# Patient Record
Sex: FEMALE | Race: WHITE | NOT HISPANIC OR LATINO
[De-identification: names, ages, dates, MRNs, and addresses within clinical notes are randomized per-mention and may not be internally consistent; named-entity substitution may affect disease eponyms.]

---

## 2017-05-22 ENCOUNTER — NON-APPOINTMENT (OUTPATIENT)
Age: 82
End: 2017-05-22

## 2017-05-22 ENCOUNTER — APPOINTMENT (OUTPATIENT)
Dept: CARDIOLOGY | Facility: CLINIC | Age: 82
End: 2017-05-22

## 2017-05-22 VITALS
WEIGHT: 113 LBS | SYSTOLIC BLOOD PRESSURE: 140 MMHG | HEART RATE: 59 BPM | BODY MASS INDEX: 21.35 KG/M2 | OXYGEN SATURATION: 97 % | DIASTOLIC BLOOD PRESSURE: 90 MMHG

## 2017-06-07 ENCOUNTER — APPOINTMENT (OUTPATIENT)
Dept: OPHTHALMOLOGY | Facility: CLINIC | Age: 82
End: 2017-06-07

## 2017-06-13 ENCOUNTER — APPOINTMENT (OUTPATIENT)
Dept: CARDIOLOGY | Facility: CLINIC | Age: 82
End: 2017-06-13

## 2017-06-14 ENCOUNTER — APPOINTMENT (OUTPATIENT)
Dept: OPHTHALMOLOGY | Facility: CLINIC | Age: 82
End: 2017-06-14

## 2017-06-14 DIAGNOSIS — H40.039 ANATOMICAL NARROW ANGLE, UNSPECIFIED EYE: ICD-10-CM

## 2017-10-16 ENCOUNTER — TRANSCRIPTION ENCOUNTER (OUTPATIENT)
Age: 82
End: 2017-10-16

## 2017-11-22 ENCOUNTER — APPOINTMENT (OUTPATIENT)
Dept: OPHTHALMOLOGY | Facility: CLINIC | Age: 82
End: 2017-11-22

## 2018-04-19 ENCOUNTER — APPOINTMENT (OUTPATIENT)
Dept: OPHTHALMOLOGY | Facility: CLINIC | Age: 83
End: 2018-04-19
Payer: MEDICARE

## 2018-04-19 PROCEDURE — 92012 INTRM OPH EXAM EST PATIENT: CPT

## 2018-04-19 PROCEDURE — 92083 EXTENDED VISUAL FIELD XM: CPT

## 2018-04-19 PROCEDURE — 92133 CPTRZD OPH DX IMG PST SGM ON: CPT

## 2018-10-31 ENCOUNTER — APPOINTMENT (OUTPATIENT)
Dept: OPHTHALMOLOGY | Facility: CLINIC | Age: 83
End: 2018-10-31
Payer: MEDICARE

## 2018-10-31 DIAGNOSIS — H40.003 PREGLAUCOMA, UNSPECIFIED, BILATERAL: ICD-10-CM

## 2018-10-31 DIAGNOSIS — H35.30 UNSPECIFIED MACULAR DEGENERATION: ICD-10-CM

## 2018-10-31 PROCEDURE — 92226: CPT | Mod: RT

## 2018-10-31 PROCEDURE — 92014 COMPRE OPH EXAM EST PT 1/>: CPT

## 2018-10-31 PROCEDURE — 92250 FUNDUS PHOTOGRAPHY W/I&R: CPT

## 2018-10-31 PROCEDURE — 92083 EXTENDED VISUAL FIELD XM: CPT

## 2018-10-31 PROCEDURE — 92020 GONIOSCOPY: CPT

## 2019-05-01 ENCOUNTER — NON-APPOINTMENT (OUTPATIENT)
Age: 84
End: 2019-05-01

## 2019-05-01 ENCOUNTER — APPOINTMENT (OUTPATIENT)
Dept: OPHTHALMOLOGY | Facility: CLINIC | Age: 84
End: 2019-05-01
Payer: MEDICARE

## 2019-05-01 PROCEDURE — 92014 COMPRE OPH EXAM EST PT 1/>: CPT

## 2019-05-01 PROCEDURE — 92083 EXTENDED VISUAL FIELD XM: CPT

## 2019-05-01 PROCEDURE — 92133 CPTRZD OPH DX IMG PST SGM ON: CPT

## 2019-11-06 ENCOUNTER — APPOINTMENT (OUTPATIENT)
Dept: OPHTHALMOLOGY | Facility: CLINIC | Age: 84
End: 2019-11-06

## 2019-12-24 ENCOUNTER — APPOINTMENT (OUTPATIENT)
Dept: CARDIOTHORACIC SURGERY | Facility: CLINIC | Age: 84
End: 2019-12-24
Payer: MEDICARE

## 2019-12-24 VITALS
TEMPERATURE: 97.6 F | RESPIRATION RATE: 13 BRPM | OXYGEN SATURATION: 98 % | SYSTOLIC BLOOD PRESSURE: 177 MMHG | HEART RATE: 50 BPM | DIASTOLIC BLOOD PRESSURE: 104 MMHG

## 2019-12-24 VITALS — WEIGHT: 100 LBS | HEIGHT: 60 IN | BODY MASS INDEX: 19.63 KG/M2

## 2019-12-24 DIAGNOSIS — I05.0 RHEUMATIC MITRAL STENOSIS: ICD-10-CM

## 2019-12-24 DIAGNOSIS — Z78.9 OTHER SPECIFIED HEALTH STATUS: ICD-10-CM

## 2019-12-24 PROCEDURE — 99204 OFFICE O/P NEW MOD 45 MIN: CPT

## 2019-12-24 RX ORDER — MULTIVIT-MIN/FOLIC/VIT K/LYCOP 400-300MCG
1000 TABLET ORAL DAILY
Refills: 0 | Status: ACTIVE | COMMUNITY
Start: 2019-12-24

## 2019-12-24 NOTE — HISTORY OF PRESENT ILLNESS
[FreeTextEntry1] : Anat is an 84 year old  active female nonsmoker with PMH of chronic atrial fibrillation (Coumadin), macular degeneration, HTN, HLD, glaucoma, and mitral valve stenosis. She has been followed by Dr. Michaels for her cardiology care in recent years. Recent transesophageal echocardiogram demonstrated aortic valve regurgitation is mild, mitral valve stenosis is severely present, mitral valve area is 0.8 cm2, mitral valve peak gradient is 14 mmHg, mitral valve mean gradient is 6 mmHg. Pulmonary HTN is severely present. Right ventricular systolic pressure is 64 mmHg. left ventricular ejection fraction is 45-50%. She reports recent CASTREJON when walking on level ground and pedal edema. She is now on Furosemide 40 mg every other day.

## 2019-12-24 NOTE — PHYSICAL EXAM
[General Appearance - Alert] : alert [General Appearance - In No Acute Distress] : in no acute distress [Sclera] : the sclera and conjunctiva were normal [PERRL With Normal Accommodation] : pupils were equal in size, round, and reactive to light [Extraocular Movements] : extraocular movements were intact [Outer Ear] : the ears and nose were normal in appearance [Oropharynx] : the oropharynx was normal [Jugular Venous Distention Increased] : there was no jugular-venous distention [Respiration, Rhythm And Depth] : normal respiratory rhythm and effort [Examination Of The Chest] : the chest was normal in appearance [Edema] : there was no peripheral edema [Veins - Varicosity Changes] : there were no varicosital changes [Breast Appearance] : normal in appearance [Bowel Sounds] : normal bowel sounds [Abdomen Soft] : soft [Cervical Lymph Nodes Enlarged Posterior Bilaterally] : posterior cervical [Cervical Lymph Nodes Enlarged Anterior Bilaterally] : anterior cervical [No CVA Tenderness] : no ~M costovertebral angle tenderness [Abnormal Walk] : normal gait [Involuntary Movements] : no involuntary movements were seen [Skin Color & Pigmentation] : normal skin color and pigmentation [Oriented To Time, Place, And Person] : oriented to person, place, and time [Impaired Insight] : insight and judgment were intact [Apical Impulse] : the apical impulse was normal [Chest Visual Inspection Thoracic Asymmetry] : no chest asymmetry [Irregularly Irregular] : the rhythm was irregularly irregular [No Focal Deficits] : no focal deficits [FreeTextEntry1] : deferred

## 2019-12-24 NOTE — REVIEW OF SYSTEMS
[Feeling Tired] : feeling tired [SOB on Exertion] : shortness of breath during exertion [Joint Swelling] : joint swelling [Negative] : Heme/Lymph [Chest Pain] : no chest pain [Dizziness] : no dizziness [Palpitations] : no palpitations

## 2019-12-24 NOTE — CONSULT LETTER
[Dear  ___] : Dear ~HORTENSIA, [Courtesy Letter:] : I had the pleasure of seeing your patient, [unfilled], in my office today. [Please see my note below.] : Please see my note below. [Consult Closing:] : Thank you very much for allowing me to participate in the care of this patient.  If you have any questions, please do not hesitate to contact me. [Sincerely,] : Sincerely, [FreeTextEntry2] : Constantino Graves, \par [address] [place] [address]\[place]  [address] [FreeTextEntry3] : Man Hall MD\par  & \par \par Cardiovascular & Thoracic Surgery\par [facility] \par [address]\par [facility] [address]\par

## 2019-12-24 NOTE — ASSESSMENT
[FreeTextEntry1] : Anat is an 84 year old female with PMH of atrial fibrillation, macular degeneration, HTN, HLD, glaucoma, and mitral valve stenosis. She has been followed by Dr. Jay Lisker for her cardiology care. Recent echocardiogram demonstrated aortic valve regurgitation is mild, mitral vlave stenosis is severely present, mitral valve area is 0.8 cm2, mitral valve peak gradient is 14 mmHg, mitral valve mean gradient is 6 mmHg. Pulmonary HTN is severely present. Right ventricular systolic pressure is 64 mmHg. left ventricular ejection fraction is 45-50%. \par \par Cardiac imaging, medical records and reports reviewed at length with patient. REESE demonstrated left ventricular ejection fraction is 55-60%. severe mitral valve stenosis, restricted mitral systolic and diastolic leaflet motion, consistent with rheumatic valvular disease, mean diastolic gradient is 7.4 mmHg, at the heart rate of 56 bpm, moderate TR.  Risks, benefits and alternatives to surgery discussed. Risks included but not limited to bleeding, stroke, myocardial Infarction, kidney problems, blood transfusion, permanent pacemaker implantation, infections and death. Operative mortality and complication risks are low. Various approaches discussed in detail. The patient will benefit and is a candidate for a mitral valve replacement. All questions have been fully answered and concerns addressed. Patient would like to proceed with surgery.\par \par Plan:\par 1) Mitral valve replacement \par 2) Stop Coumadin 3 days before, admit one day before surgery date\par \par

## 2019-12-24 NOTE — DATA REVIEWED
[FreeTextEntry1] : Transthoracic echocardiogram on 10/3/2019 demonstrated\par left atrial size is severely dilated\par EF is 45-50%\par AI is mild \par MR is mild\par Mitral stenosis is severely present. Mitral valve area 0.8cm2,\par mitral valve peak gradient is 14 mmHg, mitral mean gradient is 6 mmHg\par tricuspid regurgitation is moderate to severe. Pulm HTN is severely presents \par right ventricular systolic pressure is 64 mmHg

## 2020-01-16 PROBLEM — L01.00 IMPETIGO: Status: ACTIVE | Noted: 2020-01-16

## 2020-01-21 ENCOUNTER — APPOINTMENT (OUTPATIENT)
Dept: CARDIOTHORACIC SURGERY | Facility: CLINIC | Age: 85
End: 2020-01-21
Payer: MEDICARE

## 2020-01-21 VITALS — HEIGHT: 60 IN | WEIGHT: 101 LBS | BODY MASS INDEX: 19.83 KG/M2

## 2020-01-21 VITALS — OXYGEN SATURATION: 98 % | RESPIRATION RATE: 12 BRPM | HEART RATE: 67 BPM | TEMPERATURE: 97.3 F

## 2020-01-21 VITALS — DIASTOLIC BLOOD PRESSURE: 88 MMHG | SYSTOLIC BLOOD PRESSURE: 160 MMHG

## 2020-01-21 DIAGNOSIS — L01.00 IMPETIGO, UNSPECIFIED: ICD-10-CM

## 2020-01-21 PROCEDURE — 99214 OFFICE O/P EST MOD 30 MIN: CPT

## 2020-01-21 NOTE — ASSESSMENT
[FreeTextEntry1] : Anat is an 85 year old  active female nonsmoker with PMH of chronic atrial fibrillation (Coumadin), macular degeneration, HTN, HLD, glaucoma, and mitral valve stenosis. She has been followed by Dr. Michaels for her cardiology care in recent years. Recent transesophageal echocardiogram demonstrated aortic valve regurgitation is mild, mitral valve stenosis is severely present, mitral valve area is 0.8 cm2, mitral valve peak gradient is 14 mmHg, mitral valve mean gradient is 6 mmHg. Pulmonary HTN is severely present. Right ventricular systolic pressure is 64 mmHg. left ventricular ejection fraction is 45-50%. She reports recent CASTREJON when walking on level ground and pedal edema. She is now on Furosemide 40 mg every other day.  \par \par She returns today with complaints of impetigo and is currently on Doxycycline BID (1/16) and Mupirocin three times a day (started on 1/10). She denies fever, chills, fatigue or signs of systemic infection. \par \par Plan:\par 1) Complete course of Doxycycline and Mupirocin ointment  \par 2) Will obtain smear results from PCP office \par 3) Will post pone surgery for 1 week until the area is crusted over \par 4) Hibcleanse scrub to total body\par 5) Admit on February 11 and surgery will be on February 12th

## 2020-01-21 NOTE — HISTORY OF PRESENT ILLNESS
[FreeTextEntry1] : Anat is an 85 year old  active female nonsmoker with PMH of chronic atrial fibrillation (Coumadin), macular degeneration, HTN, HLD, glaucoma, and mitral valve stenosis. She has been followed by Dr. Michaels for her cardiology care in recent years. Recent transesophageal echocardiogram demonstrated aortic valve regurgitation is mild, mitral valve stenosis is severely present, mitral valve area is 0.8 cm2, mitral valve peak gradient is 14 mmHg, mitral valve mean gradient is 6 mmHg. Pulmonary HTN is severely present. Right ventricular systolic pressure is 64 mmHg. left ventricular ejection fraction is 45-50%. She reports recent CASTREJON when walking on level ground and pedal edema. She is now on Furosemide 40 mg every other day.  \par \par She returns today with complaints of impetigo and is currently on Doxycycline BID (1/16) and Mupirocin three times a day (started on 1/10). She denies fever, chills, fatigue or signs of systemic infection.

## 2020-01-21 NOTE — CONSULT LETTER
[Dear  ___] : Dear ~HORTENSIA, [Courtesy Letter:] : I had the pleasure of seeing your patient, [unfilled], in my office today. [Please see my note below.] : Please see my note below. [Consult Closing:] : Thank you very much for allowing me to participate in the care of this patient.  If you have any questions, please do not hesitate to contact me. [Sincerely,] : Sincerely, [FreeTextEntry3] : Man Hall MD\par  & \par \par Cardiovascular & Thoracic Surgery\par [facility] \par [address]\par [facility] [address]\par  [FreeTextEntry2] : Constantino Graves, \par [address] [place] [address]\[place]  [address]

## 2020-01-21 NOTE — PHYSICAL EXAM
[PERRL With Normal Accommodation] : pupils were equal in size, round, and reactive to light [Extraocular Movements] : extraocular movements were intact [Sclera] : the sclera and conjunctiva were normal [Jugular Venous Distention Increased] : there was no jugular-venous distention [] : no respiratory distress [Respiration, Rhythm And Depth] : normal respiratory rhythm and effort [Irregularly Irregular] : the rhythm was irregularly irregular [Examination Of The Chest] : the chest was normal in appearance [Breast Appearance] : normal in appearance [Bowel Sounds] : normal bowel sounds [Abdomen Soft] : soft [Cervical Lymph Nodes Enlarged Posterior Bilaterally] : posterior cervical [Cervical Lymph Nodes Enlarged Anterior Bilaterally] : anterior cervical [No CVA Tenderness] : no ~M costovertebral angle tenderness [Involuntary Movements] : no involuntary movements were seen [No Focal Deficits] : no focal deficits [Skin Color & Pigmentation] : normal skin color and pigmentation [Impaired Insight] : insight and judgment were intact [Oriented To Time, Place, And Person] : oriented to person, place, and time [Right Carotid Bruit] : no bruit heard over the right carotid [Left Carotid Bruit] : no bruit heard over the left carotid [FreeTextEntry1] : deferred

## 2020-02-11 ENCOUNTER — INPATIENT (INPATIENT)
Facility: HOSPITAL | Age: 85
LOS: 10 days | Discharge: HOME | DRG: 219 | End: 2020-02-22
Attending: THORACIC SURGERY (CARDIOTHORACIC VASCULAR SURGERY) | Admitting: THORACIC SURGERY (CARDIOTHORACIC VASCULAR SURGERY)
Payer: MEDICARE

## 2020-02-11 VITALS
HEART RATE: 82 BPM | SYSTOLIC BLOOD PRESSURE: 141 MMHG | TEMPERATURE: 98 F | OXYGEN SATURATION: 97 % | DIASTOLIC BLOOD PRESSURE: 78 MMHG | RESPIRATION RATE: 18 BRPM | WEIGHT: 103.62 LBS | HEIGHT: 60 IN

## 2020-02-11 DIAGNOSIS — Z90.49 ACQUIRED ABSENCE OF OTHER SPECIFIED PARTS OF DIGESTIVE TRACT: Chronic | ICD-10-CM

## 2020-02-11 DIAGNOSIS — Z98.890 OTHER SPECIFIED POSTPROCEDURAL STATES: Chronic | ICD-10-CM

## 2020-02-11 DIAGNOSIS — I05.0 RHEUMATIC MITRAL STENOSIS: ICD-10-CM

## 2020-02-11 DIAGNOSIS — Z87.2 PERSONAL HISTORY OF DISEASES OF THE SKIN AND SUBCUTANEOUS TISSUE: ICD-10-CM

## 2020-02-11 DIAGNOSIS — Z90.710 ACQUIRED ABSENCE OF BOTH CERVIX AND UTERUS: Chronic | ICD-10-CM

## 2020-02-11 DIAGNOSIS — Z86.79 PERSONAL HISTORY OF OTHER DISEASES OF THE CIRCULATORY SYSTEM: ICD-10-CM

## 2020-02-11 DIAGNOSIS — I34.8 OTHER NONRHEUMATIC MITRAL VALVE DISORDERS: ICD-10-CM

## 2020-02-11 LAB
ALBUMIN SERPL ELPH-MCNC: 4.3 G/DL — SIGNIFICANT CHANGE UP (ref 3.3–5)
ALP SERPL-CCNC: 60 U/L — SIGNIFICANT CHANGE UP (ref 40–120)
ALT FLD-CCNC: 18 U/L — SIGNIFICANT CHANGE UP (ref 10–45)
ANION GAP SERPL CALC-SCNC: 16 MMOL/L — SIGNIFICANT CHANGE UP (ref 5–17)
APTT BLD: 36.7 SEC — HIGH (ref 27.5–36.3)
AST SERPL-CCNC: 21 U/L — SIGNIFICANT CHANGE UP (ref 10–40)
BILIRUB SERPL-MCNC: 0.8 MG/DL — SIGNIFICANT CHANGE UP (ref 0.2–1.2)
BLD GP AB SCN SERPL QL: NEGATIVE — SIGNIFICANT CHANGE UP
BUN SERPL-MCNC: 36 MG/DL — HIGH (ref 7–23)
CALCIUM SERPL-MCNC: 9.8 MG/DL — SIGNIFICANT CHANGE UP (ref 8.4–10.5)
CHLORIDE SERPL-SCNC: 98 MMOL/L — SIGNIFICANT CHANGE UP (ref 96–108)
CO2 SERPL-SCNC: 27 MMOL/L — SIGNIFICANT CHANGE UP (ref 22–31)
CREAT SERPL-MCNC: 1.02 MG/DL — SIGNIFICANT CHANGE UP (ref 0.5–1.3)
FIBRINOGEN PPP-MCNC: 404 MG/DL — SIGNIFICANT CHANGE UP (ref 350–510)
GLUCOSE SERPL-MCNC: 225 MG/DL — HIGH (ref 70–99)
HBA1C BLD-MCNC: 6.4 % — HIGH (ref 4–5.6)
HCT VFR BLD CALC: 41 % — SIGNIFICANT CHANGE UP (ref 34.5–45)
HGB BLD-MCNC: 13.3 G/DL — SIGNIFICANT CHANGE UP (ref 11.5–15.5)
INR BLD: 1.92 RATIO — HIGH (ref 0.88–1.16)
MCHC RBC-ENTMCNC: 30.2 PG — SIGNIFICANT CHANGE UP (ref 27–34)
MCHC RBC-ENTMCNC: 32.4 GM/DL — SIGNIFICANT CHANGE UP (ref 32–36)
MCV RBC AUTO: 93 FL — SIGNIFICANT CHANGE UP (ref 80–100)
NRBC # BLD: 0 /100 WBCS — SIGNIFICANT CHANGE UP (ref 0–0)
NT-PROBNP SERPL-SCNC: 2443 PG/ML — HIGH (ref 0–300)
PLATELET # BLD AUTO: 162 K/UL — SIGNIFICANT CHANGE UP (ref 150–400)
POTASSIUM SERPL-MCNC: 3.8 MMOL/L — SIGNIFICANT CHANGE UP (ref 3.5–5.3)
POTASSIUM SERPL-SCNC: 3.8 MMOL/L — SIGNIFICANT CHANGE UP (ref 3.5–5.3)
PROT SERPL-MCNC: 7.2 G/DL — SIGNIFICANT CHANGE UP (ref 6–8.3)
PROTHROM AB SERPL-ACNC: 22.3 SEC — HIGH (ref 10–12.9)
RBC # BLD: 4.41 M/UL — SIGNIFICANT CHANGE UP (ref 3.8–5.2)
RBC # FLD: 14 % — SIGNIFICANT CHANGE UP (ref 10.3–14.5)
RH IG SCN BLD-IMP: POSITIVE — SIGNIFICANT CHANGE UP
SODIUM SERPL-SCNC: 141 MMOL/L — SIGNIFICANT CHANGE UP (ref 135–145)
TSH SERPL-MCNC: 0.77 UIU/ML — SIGNIFICANT CHANGE UP (ref 0.27–4.2)
WBC # BLD: 5.97 K/UL — SIGNIFICANT CHANGE UP (ref 3.8–10.5)
WBC # FLD AUTO: 5.97 K/UL — SIGNIFICANT CHANGE UP (ref 3.8–10.5)

## 2020-02-11 PROCEDURE — 93010 ELECTROCARDIOGRAM REPORT: CPT

## 2020-02-11 PROCEDURE — 71045 X-RAY EXAM CHEST 1 VIEW: CPT | Mod: 26

## 2020-02-11 PROCEDURE — 99223 1ST HOSP IP/OBS HIGH 75: CPT

## 2020-02-11 RX ORDER — CHLORHEXIDINE GLUCONATE 213 G/1000ML
30 SOLUTION TOPICAL ONCE
Refills: 0 | Status: DISCONTINUED | OUTPATIENT
Start: 2020-02-11 | End: 2020-02-12

## 2020-02-11 RX ORDER — PHYTONADIONE (VIT K1) 5 MG
10 TABLET ORAL ONCE
Refills: 0 | Status: COMPLETED | OUTPATIENT
Start: 2020-02-11 | End: 2020-02-11

## 2020-02-11 RX ORDER — ATORVASTATIN CALCIUM 80 MG/1
10 TABLET, FILM COATED ORAL
Refills: 0 | Status: DISCONTINUED | OUTPATIENT
Start: 2020-02-11 | End: 2020-02-12

## 2020-02-11 RX ORDER — METOPROLOL TARTRATE 50 MG
50 TABLET ORAL AT BEDTIME
Refills: 0 | Status: DISCONTINUED | OUTPATIENT
Start: 2020-02-11 | End: 2020-02-12

## 2020-02-11 RX ORDER — SODIUM CHLORIDE 9 MG/ML
3 INJECTION INTRAMUSCULAR; INTRAVENOUS; SUBCUTANEOUS EVERY 8 HOURS
Refills: 0 | Status: DISCONTINUED | OUTPATIENT
Start: 2020-02-11 | End: 2020-02-12

## 2020-02-11 RX ORDER — FUROSEMIDE 40 MG
40 TABLET ORAL DAILY
Refills: 0 | Status: DISCONTINUED | OUTPATIENT
Start: 2020-02-11 | End: 2020-02-12

## 2020-02-11 RX ORDER — ASCORBIC ACID 60 MG
500 TABLET,CHEWABLE ORAL DAILY
Refills: 0 | Status: DISCONTINUED | OUTPATIENT
Start: 2020-02-11 | End: 2020-02-12

## 2020-02-11 RX ORDER — POTASSIUM CHLORIDE 20 MEQ
20 PACKET (EA) ORAL ONCE
Refills: 0 | Status: COMPLETED | OUTPATIENT
Start: 2020-02-11 | End: 2020-02-11

## 2020-02-11 RX ORDER — CHLORHEXIDINE GLUCONATE 213 G/1000ML
1 SOLUTION TOPICAL ONCE
Refills: 0 | Status: COMPLETED | OUTPATIENT
Start: 2020-02-11 | End: 2020-02-11

## 2020-02-11 RX ORDER — VITAMIN E 100 UNIT
200 CAPSULE ORAL DAILY
Refills: 0 | Status: DISCONTINUED | OUTPATIENT
Start: 2020-02-11 | End: 2020-02-12

## 2020-02-11 RX ORDER — HEPARIN SODIUM 5000 [USP'U]/ML
1000 INJECTION INTRAVENOUS; SUBCUTANEOUS
Qty: 25000 | Refills: 0 | Status: DISCONTINUED | OUTPATIENT
Start: 2020-02-11 | End: 2020-02-12

## 2020-02-11 RX ORDER — METOPROLOL TARTRATE 50 MG
25 TABLET ORAL DAILY
Refills: 0 | Status: DISCONTINUED | OUTPATIENT
Start: 2020-02-11 | End: 2020-02-12

## 2020-02-11 RX ADMIN — Medication 10 MILLIGRAM(S): at 22:53

## 2020-02-11 RX ADMIN — HEPARIN SODIUM 10 UNIT(S)/HR: 5000 INJECTION INTRAVENOUS; SUBCUTANEOUS at 17:36

## 2020-02-11 RX ADMIN — SODIUM CHLORIDE 3 MILLILITER(S): 9 INJECTION INTRAMUSCULAR; INTRAVENOUS; SUBCUTANEOUS at 21:52

## 2020-02-11 RX ADMIN — HEPARIN SODIUM 10 UNIT(S)/HR: 5000 INJECTION INTRAVENOUS; SUBCUTANEOUS at 19:00

## 2020-02-11 RX ADMIN — Medication 50 MILLIGRAM(S): at 22:53

## 2020-02-11 RX ADMIN — Medication 20 MILLIEQUIVALENT(S): at 17:32

## 2020-02-11 RX ADMIN — CHLORHEXIDINE GLUCONATE 1 APPLICATION(S): 213 SOLUTION TOPICAL at 21:05

## 2020-02-11 NOTE — H&P ADULT - NSICDXPASTMEDICALHX_GEN_ALL_CORE_FT
PAST MEDICAL HISTORY:  H/O impetigo     History of chronic atrial fibrillation     Macular degeneration     Mitral stenosis

## 2020-02-11 NOTE — H&P ADULT - HISTORY OF PRESENT ILLNESS
85F PMHx chronic afib ( last coumadin 3 days ago ), macular degeneration, HTN, HLD, glaucoma and mitral stenosis.  Patient presents with CASTREJON on exertion  TTE severe mitral .  Cardiac cath reviewed by Dr Hall.   OR am for MVR

## 2020-02-11 NOTE — H&P ADULT - NSHPREVIEWOFSYSTEMS_GEN_ALL_CORE
REVIEW OF SYSTEMS:  CONSTITUTIONAL: No fever, weight loss, or fatigue  EYES: No eye pain, visual disturbances, or discharge  ENMT:  No tinnitus, vertigo, dysphagia  RESPIRATORY: No cough, SOB, wheezing, chills or hemoptysis  CARDIOVASCULAR: No chest pain, palpitations, dizziness, or leg swelling  GASTROINTESTINAL: No abdominal pain. No nausea, vomiting, or diarrhea.  GENITOURINARY: No dysuria, frequency, hematuria, or incontinence.  NEUROLOGICAL: No headaches, memory loss, loss of strength, numbness, or tremors  SKIN: No itching, burning, rashes, or lesions   ENDOCRINE: No heat or cold intolerance  MUSCULOSKELETAL: No joint pain or swelling

## 2020-02-11 NOTE — PROGRESS NOTE ADULT - SUBJECTIVE AND OBJECTIVE BOX
Cardiac Surgery Pre-op Note:    CC: Patient is a 85y old  Female who presents with a chief complaint of Mitral stenosis (11 Feb 2020 13:46)                                                                                                           Surgeon:  Rafael    Procedure: (Date) (Procedure)  2/12/2020   MVR    Allergies    No Known Allergies      Intolerances        HPI:  85F PMHx chronic afib ( last coumadin 3 days ago ), macular degeneration, HTN, HLD, glaucoma and mitral stenosis.  Patient presents with CASTREJON on exertion  TTE severe mitral .  Cardiac cath reviewed by Dr Hall.   OR am for MVR (11 Feb 2020 13:46)      PAST MEDICAL & SURGICAL HISTORY:  Macular degeneration  H/O impetigo  History of chronic atrial fibrillation  Mitral stenosis  S/P laser iridotomy  History of hysterectomy  History of appendectomy      MEDICATIONS  (STANDING):  ascorbic acid 500 milliGRAM(s) Oral daily  atorvastatin 10 milliGRAM(s) Oral <User Schedule>  chlorhexidine 0.12% Liquid 30 milliLiter(s) Swish and Spit once  chlorhexidine 4% Liquid 1 Application(s) Topical once  furosemide    Tablet 40 milliGRAM(s) Oral daily  metoprolol succinate ER 25 milliGRAM(s) Oral daily  metoprolol succinate ER 50 milliGRAM(s) Oral at bedtime  sodium chloride 0.9% lock flush 3 milliLiter(s) IV Push every 8 hours  vitamin E 200 International Unit(s) Oral daily    MEDICATIONS  (PRN):        Labs:                        13.3   5.97  )-----------( 162      ( 11 Feb 2020 15:28 )             41.0     02-11    141  |  98  |  36<H>  ----------------------------<  225<H>  3.8   |  27  |  1.02    Ca    9.8      11 Feb 2020 15:28    TPro  7.2  /  Alb  4.3  /  TBili  0.8  /  DBili  x   /  AST  21  /  ALT  18  /  AlkPhos  60  02-11    PT/INR - ( 11 Feb 2020 15:28 )   PT: 22.3 sec;   INR: 1.92 ratio         PTT - ( 11 Feb 2020 15:28 )  PTT:36.7 sec    Blood Type: ABO Interpretation: O (02-11 @ 16:26)    HGB A1C: PND     Pro-BNP: Serum Pro-Brain Natriuretic Peptide: 2443 pg/mL (02-11 @ 15:28)    Thyroid Panel: PND  MRSA:  / MSSA:  PND      CXR:  clear lungs    EKG: PND    Carotid Duplex:  done at outside lab-- no sig stenosis      Echocardiogram:  severe Mitral Stenosis.  EF 55-60    Cardiac catheterization: done at outside hospital and reviewed by Dr Hall.      Gen: WN/WD NAD  Neuro: AAOx3, nonfocal  Pulm: CTA B/L  CV:  S1S2 + ANAND 3/6  Abd: Soft, NT, ND +BS  Ext: No edema, + peripheral pulses      Pt has AICD/PPM [ ] Yes  [x] No             Brand Name:  Pre-op Beta Blocker ordered within 24 hrs of surgery (CABG ONLY)?  [x] Yes  [ ] No  If not, Why?  NPO after Midnight [x] Yes  [ ] No  Pre-op ABX ordered, to be taped on chart:  [x ] Yes  [ ] No     Hibiclens/Peridex ordered [x ] Yes  [ ] No  , CXR, REESE [x ] Cardiac Surgery Pre-op Note:    CC: Patient is a 85y old  Female who presents with a chief complaint of Mitral stenosis (11 Feb 2020 13:46)                                                                                                           Surgeon:  Rafael    Procedure: (Date) (Procedure)  2/12/2020   MVR    Allergies    No Known Allergies      Intolerances        HPI:  85F PMHx chronic afib ( last coumadin 3 days ago ), macular degeneration, HTN, HLD, glaucoma and mitral stenosis.  Patient presents with CASTREJON on exertion  TTE severe mitral .  Cardiac cath reviewed by Dr Hall.   OR am for MVR (11 Feb 2020 13:46)      PAST MEDICAL & SURGICAL HISTORY:  Macular degeneration  H/O impetigo  History of chronic atrial fibrillation  Mitral stenosis  S/P laser iridotomy  History of hysterectomy  History of appendectomy      MEDICATIONS  (STANDING):  ascorbic acid 500 milliGRAM(s) Oral daily  atorvastatin 10 milliGRAM(s) Oral <User Schedule>  chlorhexidine 0.12% Liquid 30 milliLiter(s) Swish and Spit once  chlorhexidine 4% Liquid 1 Application(s) Topical once  furosemide    Tablet 40 milliGRAM(s) Oral daily  metoprolol succinate ER 25 milliGRAM(s) Oral daily  metoprolol succinate ER 50 milliGRAM(s) Oral at bedtime  sodium chloride 0.9% lock flush 3 milliLiter(s) IV Push every 8 hours  vitamin E 200 International Unit(s) Oral daily    MEDICATIONS  (PRN):        Labs:                        13.3   5.97  )-----------( 162      ( 11 Feb 2020 15:28 )             41.0     02-11    141  |  98  |  36<H>  ----------------------------<  225<H>  3.8   |  27  |  1.02    Ca    9.8      11 Feb 2020 15:28    TPro  7.2  /  Alb  4.3  /  TBili  0.8  /  DBili  x   /  AST  21  /  ALT  18  /  AlkPhos  60  02-11    PT/INR - ( 11 Feb 2020 15:28 )   PT: 22.3 sec;   INR: 1.92 ratio         PTT - ( 11 Feb 2020 15:28 )  PTT:36.7 sec    Blood Type: ABO Interpretation: O (02-11 @ 16:26)    HGB A1C: PND     Pro-BNP: Serum Pro-Brain Natriuretic Peptide: 2443 pg/mL (02-11 @ 15:28)    Thyroid Panel: PND  MRSA:  / MSSA:  PND      CXR:  left effusion    EKG: PND    Carotid Duplex:  done at outside lab-- no sig stenosis      Echocardiogram:  severe Mitral Stenosis.  EF 55-60    Cardiac catheterization: done at outside hospital and reviewed by Dr Hall.      Gen: WN/WD NAD  Neuro: AAOx3, nonfocal  Pulm: CTA B/L  CV:  S1S2 + ANAND 3/6  Abd: Soft, NT, ND +BS  Ext: No edema, + peripheral pulses      Pt has AICD/PPM [ ] Yes  [x] No             Brand Name:  Pre-op Beta Blocker ordered within 24 hrs of surgery ([facility]ABG ONLY)?  [x] Yes  [ ] No  If not, Why?  NPO after Midnight [x] Yes  [ ] No  Pre-op ABX ordered, to be taped on chart:  [x ] Yes  [ ] No     Hibiclens/Peridex ordered [x ] Yes  [ ] No  , CXR, REESE [x ]

## 2020-02-11 NOTE — H&P ADULT - NSHPPHYSICALEXAM_GEN_ALL_CORE
General: WN/WD NAD  Neurology: A&Ox3, nonfocal, BHAGAT x 4  Head:  Normocephalic, atraumatic  ENT:  Mucosa moist, no ulcerations  Neck:  Supple, no sinuses or palpable masses  Lymphatic:  No palpable cervical, supraclavicular, axillary or inguinal adenopathy  Respiratory: CTA B/L  CV: RRR, S1S2, no murmur  Abdominal: Soft, NT, ND no palpable mass  MSK: No edema, + peripheral pulses, FROM all 4 extremity General: WN/WD NAD  Neurology: A&Ox3, nonfocal, BHAGAT x 4  Head:  Normocephalic, atraumatic  ENT:  Mucosa moist, no ulcerations.  Dry erythematous lesion under nares ( previous ulcer/impetigo )  Neck:  Supple, no sinuses or palpable masses  Lymphatic:  No palpable cervical, supraclavicular, axillary or inguinal adenopathy  Respiratory: CTA B/L  CV: RRR, S1S2,  ANAND GR 3/6  Abdominal: Soft, NT, ND no palpable mass  MSK: No edema, + peripheral pulses, FROM all 4 extremity

## 2020-02-11 NOTE — H&P ADULT - PROBLEM SELECTOR PLAN 1
Pre op for OR 2/12  NPO after MN  Heaprin if INR <2.0 Pre op for OR 2/12  NPO after MN  Heparin if INR <2.0

## 2020-02-11 NOTE — H&P ADULT - ASSESSMENT
85F PMHx chronic afib ( last coumadin 3 days ago ), macular degeneration, HTN, HLD, glaucoma and mitral stenosis.  Patient presents with CASTREJON on exertion  TTE severe mitral .  Cardiac cath reviewed by Dr Hall.   OR am for MVR 85F PMHx chronic afib ( last coumadin 3 days ago ), macular degeneration, HTN, HLD, glaucoma and mitral stenosis.  Patient presents with CASTREJON on exertion  TTE severe mitral stenosis.  Cardiac cath reviewed by Dr Hall.   OR am for MVR

## 2020-02-11 NOTE — PROGRESS NOTE ADULT - ASSESSMENT
85F PMHx chronic afib ( last coumadin 3 days ago ), macular degeneration, HTN, HLD, glaucoma and mitral stenosis.  Patient presents with CASTREJON on exertion  TTE severe mitral .  Cardiac cath reviewed by Dr Hall.   OR kwesi for MVR (11 Feb 2020 13:46).

## 2020-02-12 ENCOUNTER — RESULT REVIEW (OUTPATIENT)
Age: 85
End: 2020-02-12

## 2020-02-12 ENCOUNTER — APPOINTMENT (OUTPATIENT)
Dept: CARDIOTHORACIC SURGERY | Facility: HOSPITAL | Age: 85
End: 2020-02-12

## 2020-02-12 PROBLEM — H35.30 UNSPECIFIED MACULAR DEGENERATION: Chronic | Status: ACTIVE | Noted: 2020-02-11

## 2020-02-12 PROBLEM — Z86.79 PERSONAL HISTORY OF OTHER DISEASES OF THE CIRCULATORY SYSTEM: Chronic | Status: ACTIVE | Noted: 2020-02-11

## 2020-02-12 PROBLEM — Z87.2 PERSONAL HISTORY OF DISEASES OF THE SKIN AND SUBCUTANEOUS TISSUE: Chronic | Status: ACTIVE | Noted: 2020-02-11

## 2020-02-12 PROBLEM — I05.0 RHEUMATIC MITRAL STENOSIS: Chronic | Status: ACTIVE | Noted: 2020-02-11

## 2020-02-12 LAB
ALBUMIN SERPL ELPH-MCNC: 3 G/DL — LOW (ref 3.3–5)
ALP SERPL-CCNC: 44 U/L — SIGNIFICANT CHANGE UP (ref 40–120)
ALT FLD-CCNC: 18 U/L — SIGNIFICANT CHANGE UP (ref 10–45)
ANION GAP SERPL CALC-SCNC: 15 MMOL/L — SIGNIFICANT CHANGE UP (ref 5–17)
APPEARANCE UR: CLEAR — SIGNIFICANT CHANGE UP
APTT BLD: 26.3 SEC — LOW (ref 27.5–36.3)
APTT BLD: 34.6 SEC — SIGNIFICANT CHANGE UP (ref 27.5–36.3)
AST SERPL-CCNC: 47 U/L — HIGH (ref 10–40)
BASE EXCESS BLDV CALC-SCNC: 2.4 MMOL/L — HIGH (ref -2–2)
BASE EXCESS BLDV CALC-SCNC: 4.7 MMOL/L — HIGH (ref -2–2)
BASE EXCESS BLDV CALC-SCNC: 5.1 MMOL/L — HIGH (ref -2–2)
BASE EXCESS BLDV CALC-SCNC: 5.5 MMOL/L — HIGH (ref -2–2)
BASE EXCESS BLDV CALC-SCNC: 5.9 MMOL/L — HIGH (ref -2–2)
BASOPHILS # BLD AUTO: 0.01 K/UL — SIGNIFICANT CHANGE UP (ref 0–0.2)
BASOPHILS NFR BLD AUTO: 0.1 % — SIGNIFICANT CHANGE UP (ref 0–2)
BILIRUB SERPL-MCNC: 1.2 MG/DL — SIGNIFICANT CHANGE UP (ref 0.2–1.2)
BILIRUB UR-MCNC: NEGATIVE — SIGNIFICANT CHANGE UP
BLD GP AB SCN SERPL QL: NEGATIVE — SIGNIFICANT CHANGE UP
BLOOD GAS VENOUS - CREATININE: SIGNIFICANT CHANGE UP MG/DL (ref 0.5–1.3)
BUN SERPL-MCNC: 17 MG/DL — SIGNIFICANT CHANGE UP (ref 7–23)
CA-I SERPL-SCNC: 0.9 MMOL/L — LOW (ref 1.12–1.3)
CA-I SERPL-SCNC: 0.92 MMOL/L — LOW (ref 1.12–1.3)
CA-I SERPL-SCNC: 0.94 MMOL/L — LOW (ref 1.12–1.3)
CA-I SERPL-SCNC: 0.95 MMOL/L — LOW (ref 1.12–1.3)
CA-I SERPL-SCNC: 1.27 MMOL/L — SIGNIFICANT CHANGE UP (ref 1.12–1.3)
CALCIUM SERPL-MCNC: 9.8 MG/DL — SIGNIFICANT CHANGE UP (ref 8.4–10.5)
CHLORIDE BLDV-SCNC: SIGNIFICANT CHANGE UP MMOL/L (ref 96–108)
CHLORIDE SERPL-SCNC: 108 MMOL/L — SIGNIFICANT CHANGE UP (ref 96–108)
CK MB BLD-MCNC: 14.3 % — HIGH (ref 0–3.5)
CK MB CFR SERPL CALC: 45.3 NG/ML — HIGH (ref 0–3.8)
CK SERPL-CCNC: 317 U/L — HIGH (ref 25–170)
CO2 SERPL-SCNC: 23 MMOL/L — SIGNIFICANT CHANGE UP (ref 22–31)
COLOR SPEC: COLORLESS — SIGNIFICANT CHANGE UP
CREAT SERPL-MCNC: 0.82 MG/DL — SIGNIFICANT CHANGE UP (ref 0.5–1.3)
DIFF PNL FLD: NEGATIVE — SIGNIFICANT CHANGE UP
EOSINOPHIL # BLD AUTO: 0.02 K/UL — SIGNIFICANT CHANGE UP (ref 0–0.5)
EOSINOPHIL NFR BLD AUTO: 0.2 % — SIGNIFICANT CHANGE UP (ref 0–6)
FIBRINOGEN PPP-MCNC: 306 MG/DL — LOW (ref 350–510)
GAS PNL BLDA: SIGNIFICANT CHANGE UP
GAS PNL BLDV: 136 MMOL/L — SIGNIFICANT CHANGE UP (ref 135–145)
GAS PNL BLDV: 136 MMOL/L — SIGNIFICANT CHANGE UP (ref 135–145)
GAS PNL BLDV: 137 MMOL/L — SIGNIFICANT CHANGE UP (ref 135–145)
GAS PNL BLDV: SIGNIFICANT CHANGE UP
GLUCOSE BLDV-MCNC: 117 MG/DL — HIGH (ref 70–99)
GLUCOSE BLDV-MCNC: 124 MG/DL — HIGH (ref 70–99)
GLUCOSE BLDV-MCNC: 127 MG/DL — HIGH (ref 70–99)
GLUCOSE BLDV-MCNC: 132 MG/DL — HIGH (ref 70–99)
GLUCOSE BLDV-MCNC: 138 MG/DL — HIGH (ref 70–99)
GLUCOSE SERPL-MCNC: 142 MG/DL — HIGH (ref 70–99)
GLUCOSE UR QL: NEGATIVE — SIGNIFICANT CHANGE UP
HCO3 BLDV-SCNC: 26 MMOL/L — SIGNIFICANT CHANGE UP (ref 21–29)
HCO3 BLDV-SCNC: 30 MMOL/L — HIGH (ref 21–29)
HCT VFR BLD CALC: 23.1 % — LOW (ref 34.5–45)
HCT VFR BLDA CALC: 21 % — CRITICAL LOW (ref 39–50)
HCT VFR BLDA CALC: 23 % — LOW (ref 39–50)
HCT VFR BLDA CALC: 23 % — LOW (ref 39–50)
HCT VFR BLDA CALC: 25 % — LOW (ref 39–50)
HCT VFR BLDA CALC: 25 % — LOW (ref 39–50)
HEPARINASE TEG R TIME: 10.5 MIN (ref 4.3–8.3)
HGB BLD CALC-MCNC: 6.7 G/DL — CRITICAL LOW (ref 11.5–15.5)
HGB BLD CALC-MCNC: 7.3 G/DL — LOW (ref 11.5–15.5)
HGB BLD CALC-MCNC: 7.3 G/DL — LOW (ref 11.5–15.5)
HGB BLD CALC-MCNC: 8 G/DL — LOW (ref 11.5–15.5)
HGB BLD CALC-MCNC: 8 G/DL — LOW (ref 11.5–15.5)
HGB BLD-MCNC: 7.7 G/DL — LOW (ref 11.5–15.5)
HOROWITZ INDEX BLDV+IHG-RTO: 0 — SIGNIFICANT CHANGE UP
IMM GRANULOCYTES NFR BLD AUTO: 1.3 % — SIGNIFICANT CHANGE UP (ref 0–1.5)
INR BLD: 1.24 RATIO — HIGH (ref 0.88–1.16)
INR BLD: 1.78 RATIO — HIGH (ref 0.88–1.16)
KETONES UR-MCNC: NEGATIVE — SIGNIFICANT CHANGE UP
LACTATE BLDV-MCNC: 0.5 MMOL/L — LOW (ref 0.7–2)
LACTATE BLDV-MCNC: 0.5 MMOL/L — LOW (ref 0.7–2)
LACTATE BLDV-MCNC: 0.6 MMOL/L — LOW (ref 0.7–2)
LACTATE BLDV-MCNC: 0.6 MMOL/L — LOW (ref 0.7–2)
LACTATE BLDV-MCNC: 0.8 MMOL/L — SIGNIFICANT CHANGE UP (ref 0.7–2)
LEUKOCYTE ESTERASE UR-ACNC: NEGATIVE — SIGNIFICANT CHANGE UP
LYMPHOCYTES # BLD AUTO: 0.71 K/UL — LOW (ref 1–3.3)
LYMPHOCYTES # BLD AUTO: 7.7 % — LOW (ref 13–44)
MCHC RBC-ENTMCNC: 30.6 PG — SIGNIFICANT CHANGE UP (ref 27–34)
MCHC RBC-ENTMCNC: 33.3 GM/DL — SIGNIFICANT CHANGE UP (ref 32–36)
MCV RBC AUTO: 91.7 FL — SIGNIFICANT CHANGE UP (ref 80–100)
MONOCYTES # BLD AUTO: 0.25 K/UL — SIGNIFICANT CHANGE UP (ref 0–0.9)
MONOCYTES NFR BLD AUTO: 2.7 % — SIGNIFICANT CHANGE UP (ref 2–14)
MRSA PCR RESULT.: SIGNIFICANT CHANGE UP
NEUTROPHILS # BLD AUTO: 8.13 K/UL — HIGH (ref 1.8–7.4)
NEUTROPHILS NFR BLD AUTO: 88 % — HIGH (ref 43–77)
NITRITE UR-MCNC: NEGATIVE — SIGNIFICANT CHANGE UP
NRBC # BLD: 0 /100 WBCS — SIGNIFICANT CHANGE UP (ref 0–0)
PCO2 BLDV: 38 MMHG — SIGNIFICANT CHANGE UP (ref 35–50)
PCO2 BLDV: 44 MMHG — SIGNIFICANT CHANGE UP (ref 35–50)
PCO2 BLDV: 45 MMHG — SIGNIFICANT CHANGE UP (ref 35–50)
PCO2 BLDV: 48 MMHG — SIGNIFICANT CHANGE UP (ref 35–50)
PCO2 BLDV: 50 MMHG — SIGNIFICANT CHANGE UP (ref 35–50)
PH BLDV: 7.39 — SIGNIFICANT CHANGE UP (ref 7.35–7.45)
PH BLDV: 7.42 — SIGNIFICANT CHANGE UP (ref 7.35–7.45)
PH BLDV: 7.43 — SIGNIFICANT CHANGE UP (ref 7.35–7.45)
PH BLDV: 7.44 — SIGNIFICANT CHANGE UP (ref 7.35–7.45)
PH BLDV: 7.45 — SIGNIFICANT CHANGE UP (ref 7.35–7.45)
PH UR: 6.5 — SIGNIFICANT CHANGE UP (ref 5–8)
PLATELET # BLD AUTO: 100 K/UL — LOW (ref 150–400)
PO2 BLDV: 190 MMHG — HIGH (ref 25–45)
PO2 BLDV: 221 MMHG — HIGH (ref 25–45)
PO2 BLDV: 222 MMHG — HIGH (ref 25–45)
PO2 BLDV: 237 MMHG — HIGH (ref 25–45)
PO2 BLDV: 55 MMHG — HIGH (ref 25–45)
POTASSIUM BLDV-SCNC: 4.9 MMOL/L — SIGNIFICANT CHANGE UP (ref 3.5–5)
POTASSIUM BLDV-SCNC: 5.9 MMOL/L — HIGH (ref 3.5–5)
POTASSIUM BLDV-SCNC: 6.1 MMOL/L — HIGH (ref 3.5–5)
POTASSIUM BLDV-SCNC: 6.4 MMOL/L — CRITICAL HIGH (ref 3.5–5)
POTASSIUM BLDV-SCNC: 6.5 MMOL/L — CRITICAL HIGH (ref 3.5–5)
POTASSIUM SERPL-MCNC: 4.2 MMOL/L — SIGNIFICANT CHANGE UP (ref 3.5–5.3)
POTASSIUM SERPL-SCNC: 4.2 MMOL/L — SIGNIFICANT CHANGE UP (ref 3.5–5.3)
PROT SERPL-MCNC: 5.1 G/DL — LOW (ref 6–8.3)
PROT UR-MCNC: NEGATIVE — SIGNIFICANT CHANGE UP
PROTHROM AB SERPL-ACNC: 14.3 SEC — HIGH (ref 10–12.9)
PROTHROM AB SERPL-ACNC: 20.6 SEC — HIGH (ref 10–12.9)
RAPIDTEG MAXIMUM AMPLITUDE: 49.2 MM (ref 52–70)
RBC # BLD: 2.52 M/UL — LOW (ref 3.8–5.2)
RBC # FLD: 14 % — SIGNIFICANT CHANGE UP (ref 10.3–14.5)
RH IG SCN BLD-IMP: POSITIVE — SIGNIFICANT CHANGE UP
S AUREUS DNA NOSE QL NAA+PROBE: SIGNIFICANT CHANGE UP
SAO2 % BLDV: 100 % — HIGH (ref 67–88)
SAO2 % BLDV: 86 % — SIGNIFICANT CHANGE UP (ref 67–88)
SODIUM SERPL-SCNC: 146 MMOL/L — HIGH (ref 135–145)
SP GR SPEC: 1.01 — LOW (ref 1.01–1.02)
TEG FUNCTIONAL FIBRINOGEN: 15.8 MM — SIGNIFICANT CHANGE UP (ref 15–32)
TEG MAXIMUM AMPLITUDE: 53 MM — SIGNIFICANT CHANGE UP (ref 52–69)
TEG REACTION TIME: 10.1 MIN (ref 4.6–9.1)
TROPONIN T, HIGH SENSITIVITY RESULT: 1066 NG/L — HIGH (ref 0–51)
UROBILINOGEN FLD QL: NEGATIVE — SIGNIFICANT CHANGE UP
WBC # BLD: 9.24 K/UL — SIGNIFICANT CHANGE UP (ref 3.8–10.5)
WBC # FLD AUTO: 9.24 K/UL — SIGNIFICANT CHANGE UP (ref 3.8–10.5)

## 2020-02-12 PROCEDURE — 88305 TISSUE EXAM BY PATHOLOGIST: CPT | Mod: 26

## 2020-02-12 PROCEDURE — 93010 ELECTROCARDIOGRAM REPORT: CPT

## 2020-02-12 PROCEDURE — 71045 X-RAY EXAM CHEST 1 VIEW: CPT | Mod: 26

## 2020-02-12 PROCEDURE — 33430 REPLACEMENT OF MITRAL VALVE: CPT

## 2020-02-12 PROCEDURE — 33464 VALVULOPLASTY TRICUSPID: CPT

## 2020-02-12 PROCEDURE — 99291 CRITICAL CARE FIRST HOUR: CPT

## 2020-02-12 PROCEDURE — 88311 DECALCIFY TISSUE: CPT | Mod: 26

## 2020-02-12 RX ORDER — CHLORHEXIDINE GLUCONATE 213 G/1000ML
1 SOLUTION TOPICAL
Refills: 0 | Status: DISCONTINUED | OUTPATIENT
Start: 2020-02-12 | End: 2020-02-17

## 2020-02-12 RX ORDER — CEFUROXIME AXETIL 250 MG
1500 TABLET ORAL EVERY 8 HOURS
Refills: 0 | Status: COMPLETED | OUTPATIENT
Start: 2020-02-12 | End: 2020-02-14

## 2020-02-12 RX ORDER — INSULIN HUMAN 100 [IU]/ML
3 INJECTION, SOLUTION SUBCUTANEOUS
Qty: 100 | Refills: 0 | Status: DISCONTINUED | OUTPATIENT
Start: 2020-02-12 | End: 2020-02-13

## 2020-02-12 RX ORDER — ALBUMIN HUMAN 25 %
250 VIAL (ML) INTRAVENOUS ONCE
Refills: 0 | Status: COMPLETED | OUTPATIENT
Start: 2020-02-12 | End: 2020-02-12

## 2020-02-12 RX ORDER — POTASSIUM CHLORIDE 20 MEQ
10 PACKET (EA) ORAL
Refills: 0 | Status: COMPLETED | OUTPATIENT
Start: 2020-02-12 | End: 2020-02-12

## 2020-02-12 RX ORDER — METOCLOPRAMIDE HCL 10 MG
10 TABLET ORAL EVERY 8 HOURS
Refills: 0 | Status: COMPLETED | OUTPATIENT
Start: 2020-02-12 | End: 2020-02-14

## 2020-02-12 RX ORDER — POTASSIUM CHLORIDE 20 MEQ
10 PACKET (EA) ORAL
Refills: 0 | Status: DISCONTINUED | OUTPATIENT
Start: 2020-02-12 | End: 2020-02-14

## 2020-02-12 RX ORDER — PHYTONADIONE (VIT K1) 5 MG
10 TABLET ORAL ONCE
Refills: 0 | Status: COMPLETED | OUTPATIENT
Start: 2020-02-12 | End: 2020-02-12

## 2020-02-12 RX ORDER — SODIUM CHLORIDE 9 MG/ML
500 INJECTION, SOLUTION INTRAVENOUS ONCE
Refills: 0 | Status: COMPLETED | OUTPATIENT
Start: 2020-02-12 | End: 2020-02-12

## 2020-02-12 RX ORDER — FENTANYL CITRATE 50 UG/ML
50 INJECTION INTRAVENOUS ONCE
Refills: 0 | Status: DISCONTINUED | OUTPATIENT
Start: 2020-02-12 | End: 2020-02-12

## 2020-02-12 RX ORDER — DEXTROSE 50 % IN WATER 50 %
50 SYRINGE (ML) INTRAVENOUS
Refills: 0 | Status: DISCONTINUED | OUTPATIENT
Start: 2020-02-12 | End: 2020-02-13

## 2020-02-12 RX ORDER — DOBUTAMINE HCL 250MG/20ML
1.25 VIAL (ML) INTRAVENOUS
Qty: 500 | Refills: 0 | Status: DISCONTINUED | OUTPATIENT
Start: 2020-02-12 | End: 2020-02-13

## 2020-02-12 RX ORDER — NOREPINEPHRINE BITARTRATE/D5W 8 MG/250ML
0.05 PLASTIC BAG, INJECTION (ML) INTRAVENOUS
Qty: 8 | Refills: 0 | Status: DISCONTINUED | OUTPATIENT
Start: 2020-02-12 | End: 2020-02-13

## 2020-02-12 RX ORDER — CHLORHEXIDINE GLUCONATE 213 G/1000ML
5 SOLUTION TOPICAL EVERY 4 HOURS
Refills: 0 | Status: DISCONTINUED | OUTPATIENT
Start: 2020-02-12 | End: 2020-02-13

## 2020-02-12 RX ORDER — HYDROMORPHONE HYDROCHLORIDE 2 MG/ML
1 INJECTION INTRAMUSCULAR; INTRAVENOUS; SUBCUTANEOUS ONCE
Refills: 0 | Status: DISCONTINUED | OUTPATIENT
Start: 2020-02-12 | End: 2020-02-12

## 2020-02-12 RX ORDER — DEXMEDETOMIDINE HYDROCHLORIDE IN 0.9% SODIUM CHLORIDE 4 UG/ML
1.5 INJECTION INTRAVENOUS
Qty: 200 | Refills: 0 | Status: DISCONTINUED | OUTPATIENT
Start: 2020-02-12 | End: 2020-02-13

## 2020-02-12 RX ORDER — POLYETHYLENE GLYCOL 3350 17 G/17G
17 POWDER, FOR SOLUTION ORAL DAILY
Refills: 0 | Status: DISCONTINUED | OUTPATIENT
Start: 2020-02-12 | End: 2020-02-22

## 2020-02-12 RX ORDER — MEPERIDINE HYDROCHLORIDE 50 MG/ML
25 INJECTION INTRAMUSCULAR; INTRAVENOUS; SUBCUTANEOUS ONCE
Refills: 0 | Status: DISCONTINUED | OUTPATIENT
Start: 2020-02-12 | End: 2020-02-13

## 2020-02-12 RX ORDER — SODIUM CHLORIDE 9 MG/ML
1000 INJECTION INTRAMUSCULAR; INTRAVENOUS; SUBCUTANEOUS
Refills: 0 | Status: DISCONTINUED | OUTPATIENT
Start: 2020-02-12 | End: 2020-02-16

## 2020-02-12 RX ORDER — CEFUROXIME AXETIL 250 MG
1500 TABLET ORAL ONCE
Refills: 0 | Status: DISCONTINUED | OUTPATIENT
Start: 2020-02-12 | End: 2020-02-12

## 2020-02-12 RX ORDER — DEXTROSE 50 % IN WATER 50 %
25 SYRINGE (ML) INTRAVENOUS
Refills: 0 | Status: DISCONTINUED | OUTPATIENT
Start: 2020-02-12 | End: 2020-02-13

## 2020-02-12 RX ORDER — PANTOPRAZOLE SODIUM 20 MG/1
40 TABLET, DELAYED RELEASE ORAL DAILY
Refills: 0 | Status: DISCONTINUED | OUTPATIENT
Start: 2020-02-12 | End: 2020-02-13

## 2020-02-12 RX ORDER — NICARDIPINE HYDROCHLORIDE 30 MG/1
5 CAPSULE, EXTENDED RELEASE ORAL
Qty: 40 | Refills: 0 | Status: DISCONTINUED | OUTPATIENT
Start: 2020-02-12 | End: 2020-02-12

## 2020-02-12 RX ORDER — MILRINONE LACTATE 1 MG/ML
0.12 INJECTION, SOLUTION INTRAVENOUS
Qty: 20 | Refills: 0 | Status: DISCONTINUED | OUTPATIENT
Start: 2020-02-12 | End: 2020-02-16

## 2020-02-12 RX ADMIN — FENTANYL CITRATE 50 MICROGRAM(S): 50 INJECTION INTRAVENOUS at 17:30

## 2020-02-12 RX ADMIN — SODIUM CHLORIDE 1000 MILLILITER(S): 9 INJECTION, SOLUTION INTRAVENOUS at 19:30

## 2020-02-12 RX ADMIN — Medication 10 MILLIGRAM(S): at 05:11

## 2020-02-12 RX ADMIN — Medication 100 MILLIEQUIVALENT(S): at 20:23

## 2020-02-12 RX ADMIN — Medication 100 MILLIEQUIVALENT(S): at 15:00

## 2020-02-12 RX ADMIN — Medication 100 MILLIEQUIVALENT(S): at 21:03

## 2020-02-12 RX ADMIN — Medication 100 MILLIEQUIVALENT(S): at 18:38

## 2020-02-12 RX ADMIN — Medication 40 MILLIGRAM(S): at 05:11

## 2020-02-12 RX ADMIN — MILRINONE LACTATE 7.05 MICROGRAM(S)/KG/MIN: 1 INJECTION, SOLUTION INTRAVENOUS at 19:30

## 2020-02-12 RX ADMIN — Medication 100 MILLIGRAM(S): at 23:33

## 2020-02-12 RX ADMIN — CHLORHEXIDINE GLUCONATE 5 MILLILITER(S): 213 SOLUTION TOPICAL at 17:37

## 2020-02-12 RX ADMIN — CHLORHEXIDINE GLUCONATE 5 MILLILITER(S): 213 SOLUTION TOPICAL at 21:04

## 2020-02-12 RX ADMIN — Medication 125 MILLILITER(S): at 21:00

## 2020-02-12 RX ADMIN — Medication 10 MILLIGRAM(S): at 21:04

## 2020-02-12 RX ADMIN — SODIUM CHLORIDE 3 MILLILITER(S): 9 INJECTION INTRAMUSCULAR; INTRAVENOUS; SUBCUTANEOUS at 05:03

## 2020-02-12 RX ADMIN — SODIUM CHLORIDE 1000 MILLILITER(S): 9 INJECTION, SOLUTION INTRAVENOUS at 20:30

## 2020-02-12 RX ADMIN — Medication 100 MILLIEQUIVALENT(S): at 17:34

## 2020-02-12 RX ADMIN — Medication 3.52 MICROGRAM(S)/KG/MIN: at 19:30

## 2020-02-12 RX ADMIN — Medication 100 MILLIGRAM(S): at 17:37

## 2020-02-12 RX ADMIN — FENTANYL CITRATE 50 MICROGRAM(S): 50 INJECTION INTRAVENOUS at 17:45

## 2020-02-12 RX ADMIN — Medication 100 MILLIEQUIVALENT(S): at 21:04

## 2020-02-12 RX ADMIN — DEXMEDETOMIDINE HYDROCHLORIDE IN 0.9% SODIUM CHLORIDE 17.62 MICROGRAM(S)/KG/HR: 4 INJECTION INTRAVENOUS at 19:00

## 2020-02-12 RX ADMIN — PANTOPRAZOLE SODIUM 40 MILLIGRAM(S): 20 TABLET, DELAYED RELEASE ORAL at 17:39

## 2020-02-12 RX ADMIN — INSULIN HUMAN 3 UNIT(S)/HR: 100 INJECTION, SOLUTION SUBCUTANEOUS at 19:00

## 2020-02-12 NOTE — PRE-OP CHECKLIST - SELECT TESTS ORDERED
PT/PTT/Hepatic Function/INR/BMP/CMP/Results in MD note/POCT Blood Glucose/CBC/Urinalysis/Spirometry/Type and Screen/HCG/UCG/EKG/CXR/Type and Cross

## 2020-02-12 NOTE — BRIEF OPERATIVE NOTE - NSICDXBRIEFPROCEDURE_GEN_ALL_CORE_FT
PROCEDURES:  Ligation or exclusion of left atrial appendage 12-Feb-2020 15:03:25  Andrew Fontenot  Repair of tricuspid valve in adult 12-Feb-2020 15:03:00  Andrew Fontenot  Replacement of mitral valve with cardiopulmonary bypass 12-Feb-2020 15:02:41  Andrew Fontenot  Sternotomy, adult 12-Feb-2020 15:01:45  Andrew Fontenot  Replacement of mitral valve 12-Feb-2020 15:00:02  Andrew Fontenot

## 2020-02-12 NOTE — PROGRESS NOTE ADULT - SUBJECTIVE AND OBJECTIVE BOX
MAGGY LOPEZ  MRN-[id number]  Patient is a 85y old  Female who presents with a chief complaint of Mitral stenosis (2020 16:30)    HPI:  85F PMHx chronic afib ( last coumadin 3 days ago ), macular degeneration, HTN, HLD, glaucoma and mitral stenosis.  Patient presents with CASTREJON on exertion  TTE severe mitral .  Cardiac cath reviewed by Dr Hall.   OR am for MVR (2020 13:46)      Surgery/Hospital course:  2020 Mitral valve replacemant, tricuspid valve repair    remain intubated, full vent support  on primacor and dobutamine .   on and off pressors  paced rhythm due to slow a fib .       Physical Exam:  Vital Signs Last 24 Hrs  T(C): 36.2 (2020 19:00), Max: 36.4 (2020 02:03)  T(F): 97.2 (2020 19:00), Max: 97.5 (2020 02:03)  HR: 79 (2020 21:30) (34 - 79)  BP: 169/89 (2020 04:39) (152/73 - 172/82)  BP(mean): --  RR: 10 (2020 21:30) (10 - 18)  SpO2: 100% (2020 21:30) (94% - 100%)  Gen:  intubated. sedated  CNS: sedated  Neck: no JVD  RES : clear , no wheezing    Chest:   + chest tubes                     CVS: jpaced rhythm. Normal S1/S2  Abd: Soft, non-distended. Bowel sounds present.  Skin: No rash.  Ext:  no edema, A Line  ============================I/O===========================   I&O's Detail    2020 07:01  -  2020 07:00  --------------------------------------------------------  IN:    heparin Infusion: 50 mL    Oral Fluid: 480 mL  Total IN: 530 mL    OUT:    Voided: 400 mL  Total OUT: 400 mL    Total NET: 130 mL      2020 07:01  -  2020 22:03  --------------------------------------------------------  IN:    Albumin 5%  - 250 mL: 250 mL    dexmedetomidine Infusion: 94.9 mL    DOBUTamine Infusion: 10.5 mL    DOBUTamine Infusion: 14 mL    DOBUTamine Infusion: 14.2 mL    insulin regular Infusion: 10 mL    IV PiggyBack: 400 mL    Lactated Ringers IV Bolus: 1000 mL    milrinone  Infusion: 14.2 mL    milrinone  Infusion: 14.2 mL    milrinone  Infusion: 21.2 mL    milrinone  Infusion: 5.3 mL    Packed Red Blood Cells: 350 mL    sodium chloride 0.9%.: 10 mL  Total IN: 2208.5 mL    OUT:    Bulb: 60 mL    Chest Tube: 75 mL    Indwelling Catheter - Urethral: 1640 mL  Total OUT: 1775 mL    Total NET: 433.5 mL        ============================ LABS =========================                        x      x     )-----------( 79       ( 2020 17:38 )             x        02-12    146<H>  |  108  |  17  ----------------------------<  142<H>  4.2   |  23  |  0.82    Ca    9.8      2020 14:34    TPro  5.1<L>  /  Alb  3.0<L>  /  TBili  1.2  /  DBili  x   /  AST  47<H>  /  ALT  18  /  AlkPhos  44  02-12    LIVER FUNCTIONS - ( 2020 14:34 )  Alb: 3.0 g/dL / Pro: 5.1 g/dL / ALK PHOS: 44 U/L / ALT: 18 U/L / AST: 47 U/L / GGT: x           PT/INR - ( 2020 14:34 )   PT: 14.3 sec;   INR: 1.24 ratio  PTT - ( 2020 14:34 )  PTT:26.3 sec  ABG - ( 2020 21:47 )pH, Arterial: 7.50  pH, Blood: x     /  pCO2: 32    /  pO2: 132   / HCO3: 25    / Base Excess: 2.2   /  SaO2: 100           Color: Colorless / Appearance: Clear / S.009 / pH: x  Gluc: x / Ketone: Negative  / Bili: Negative / Urobili: Negative   Blood: x / Protein: Negative / Nitrite: Negative   Leuk Esterase: Negative / RBC: x / WBC x   Sq Epi: x / Non Sq Epi: x / Bacteria: x      ======================Micro/Rad/Cardio============  CXR: Reviewed  Echo:Reviewed  ==============================================  PAST MEDICAL & SURGICAL HISTORY:  Macular degeneration  H/O impetigo  History of chronic atrial fibrillation  Mitral stenosis  S/P laser iridotomy  History of hysterectomy  History of appendectomy    ====================ASSESMENT ==============  2020 Mitral valve replacemant, tricuspid valve repair  Bradycardia  ventricualr pacing   acute systolic CHF  hemodynamic instability  Anemia blood loss  Hyperglycemia  Hyperlipidemia    Plan:  ====================== NEUROLOGY=====================  dexMEDEtomidine Infusion 1.5 MICROgram(s)/kG/Hr (17.625 mL/Hr) IV Continuous <Continuous>  meperidine     Injectable 25 milliGRAM(s) IV Push once  metoclopramide Injectable 10 milliGRAM(s) IV Push every 8 hours    ==================== RESPIRATORY======================  Mechanical Ventilation:  Mode: AC/ CMV (Assist Control/ Continuous Mandatory Ventilation)  RR (machine): 10  TV (machine): 600  FiO2: 40  PEEP: 5  PS: 40    wean to extubate as tolerated     ====================CARDIOVASCULAR==================  inotropic support . vasopressors   DOBUTamine Infusion 2.5 MICROgram(s)/kG/Min (3.525 mL/Hr) IV Continuous <Continuous>  milrinone Infusion 0.375 MICROgram(s)/kG/Min (5.287 mL/Hr) IV Continuous <Continuous>  norepinephrine Infusion 0.05 MICROgram(s)/kG/Min (4.406 mL/Hr) IV Continuous <Continuous>    ===================HEMATOLOGIC/ONC ===================  blood transfusion 1 prbc  monitor plts and Hb/Hct  ===================== RENAL =========================  Zuluaga for monitoring urine output    ==================== GASTROINTESTINAL===================  pantoprazole  Injectable 40 milliGRAM(s) IV Push daily  polyethylene glycol 3350 17 Gram(s) Oral daily  potassium chloride  10 mEq/50 mL IVPB 10 milliEquivalent(s) IV Intermittent every 1 hour  potassium chloride  10 mEq/50 mL IVPB 10 milliEquivalent(s) IV Intermittent every 1 hour  potassium chloride  10 mEq/50 mL IVPB 10 milliEquivalent(s) IV Intermittent every 1 hour  sodium chloride 0.9%. 1000 milliLiter(s) (10 mL/Hr) IV Continuous <Continuous>    =======================    ENDOCRINE  =====================  insulin regular Infusion 3 Unit(s)/Hr (3 mL/Hr) IV Continuous <Continuous>    ========================INFECTIOUS DISEASE================  cefuroxime  IVPB 1500 milliGRAM(s) IV Intermittent every 8 hours    Patient requires continuous monitoring with bedside rhythm monitoring,arterial line,pulse oximetry,ventilator monitoring;intermittent blood gas analysis.  Care plan discussed with ICU care team.  patient remain critical; required more than usual post op care; I have spent 35 minutes providing non routine post op care, revaluated multiple times during the day .

## 2020-02-12 NOTE — BRIEF OPERATIVE NOTE - OPERATION/FINDINGS
Severe Mitral Stenosis and Moderate to severe tricuspid regurgitation; Mitral valve replaced with a #31 Irene Mitral Valve; Tricuspid valve repair with #34 annuloplasty ring; open excision of left atrial appendage and closure via primary repair

## 2020-02-12 NOTE — BRIEF OPERATIVE NOTE - COMMENTS
AXC: 163 mins  EBL unable to be determined secondary to usage of cardiotomy suction and usage of cellsaver

## 2020-02-13 LAB
GAS PNL BLDA: SIGNIFICANT CHANGE UP

## 2020-02-13 PROCEDURE — 71045 X-RAY EXAM CHEST 1 VIEW: CPT | Mod: 26

## 2020-02-13 PROCEDURE — 99291 CRITICAL CARE FIRST HOUR: CPT

## 2020-02-13 PROCEDURE — 93010 ELECTROCARDIOGRAM REPORT: CPT

## 2020-02-13 RX ORDER — INSULIN LISPRO 100/ML
VIAL (ML) SUBCUTANEOUS AT BEDTIME
Refills: 0 | Status: DISCONTINUED | OUTPATIENT
Start: 2020-02-13 | End: 2020-02-14

## 2020-02-13 RX ORDER — ATORVASTATIN CALCIUM 80 MG/1
10 TABLET, FILM COATED ORAL AT BEDTIME
Refills: 0 | Status: DISCONTINUED | OUTPATIENT
Start: 2020-02-13 | End: 2020-02-17

## 2020-02-13 RX ORDER — PANTOPRAZOLE SODIUM 20 MG/1
40 TABLET, DELAYED RELEASE ORAL
Refills: 0 | Status: DISCONTINUED | OUTPATIENT
Start: 2020-02-13 | End: 2020-02-22

## 2020-02-13 RX ORDER — POTASSIUM CHLORIDE 20 MEQ
20 PACKET (EA) ORAL ONCE
Refills: 0 | Status: COMPLETED | OUTPATIENT
Start: 2020-02-13 | End: 2020-02-13

## 2020-02-13 RX ORDER — DEXTROSE 50 % IN WATER 50 %
12.5 SYRINGE (ML) INTRAVENOUS ONCE
Refills: 0 | Status: DISCONTINUED | OUTPATIENT
Start: 2020-02-13 | End: 2020-02-21

## 2020-02-13 RX ORDER — DEXTROSE 50 % IN WATER 50 %
15 SYRINGE (ML) INTRAVENOUS ONCE
Refills: 0 | Status: DISCONTINUED | OUTPATIENT
Start: 2020-02-13 | End: 2020-02-21

## 2020-02-13 RX ORDER — DEXTROSE 50 % IN WATER 50 %
25 SYRINGE (ML) INTRAVENOUS ONCE
Refills: 0 | Status: DISCONTINUED | OUTPATIENT
Start: 2020-02-13 | End: 2020-02-21

## 2020-02-13 RX ORDER — OXYCODONE AND ACETAMINOPHEN 5; 325 MG/1; MG/1
1 TABLET ORAL EVERY 6 HOURS
Refills: 0 | Status: DISCONTINUED | OUTPATIENT
Start: 2020-02-13 | End: 2020-02-19

## 2020-02-13 RX ORDER — ALBUMIN HUMAN 25 %
250 VIAL (ML) INTRAVENOUS ONCE
Refills: 0 | Status: COMPLETED | OUTPATIENT
Start: 2020-02-13 | End: 2020-02-13

## 2020-02-13 RX ORDER — KETOROLAC TROMETHAMINE 30 MG/ML
15 SYRINGE (ML) INJECTION EVERY 8 HOURS
Refills: 0 | Status: DISCONTINUED | OUTPATIENT
Start: 2020-02-13 | End: 2020-02-15

## 2020-02-13 RX ORDER — POTASSIUM CHLORIDE 20 MEQ
10 PACKET (EA) ORAL ONCE
Refills: 0 | Status: COMPLETED | OUTPATIENT
Start: 2020-02-13 | End: 2020-02-13

## 2020-02-13 RX ORDER — ASPIRIN/CALCIUM CARB/MAGNESIUM 324 MG
81 TABLET ORAL DAILY
Refills: 0 | Status: DISCONTINUED | OUTPATIENT
Start: 2020-02-13 | End: 2020-02-22

## 2020-02-13 RX ORDER — POTASSIUM CHLORIDE 20 MEQ
10 PACKET (EA) ORAL
Refills: 0 | Status: COMPLETED | OUTPATIENT
Start: 2020-02-13 | End: 2020-02-13

## 2020-02-13 RX ORDER — GLUCAGON INJECTION, SOLUTION 0.5 MG/.1ML
1 INJECTION, SOLUTION SUBCUTANEOUS ONCE
Refills: 0 | Status: DISCONTINUED | OUTPATIENT
Start: 2020-02-13 | End: 2020-02-16

## 2020-02-13 RX ORDER — WARFARIN SODIUM 2.5 MG/1
5 TABLET ORAL ONCE
Refills: 0 | Status: COMPLETED | OUTPATIENT
Start: 2020-02-13 | End: 2020-02-13

## 2020-02-13 RX ORDER — WARFARIN SODIUM 2.5 MG/1
5 TABLET ORAL ONCE
Refills: 0 | Status: DISCONTINUED | OUTPATIENT
Start: 2020-02-13 | End: 2020-02-13

## 2020-02-13 RX ORDER — AMLODIPINE BESYLATE 2.5 MG/1
10 TABLET ORAL DAILY
Refills: 0 | Status: DISCONTINUED | OUTPATIENT
Start: 2020-02-13 | End: 2020-02-17

## 2020-02-13 RX ORDER — ENOXAPARIN SODIUM 100 MG/ML
40 INJECTION SUBCUTANEOUS DAILY
Refills: 0 | Status: DISCONTINUED | OUTPATIENT
Start: 2020-02-13 | End: 2020-02-14

## 2020-02-13 RX ORDER — FUROSEMIDE 40 MG
20 TABLET ORAL DAILY
Refills: 0 | Status: DISCONTINUED | OUTPATIENT
Start: 2020-02-13 | End: 2020-02-14

## 2020-02-13 RX ORDER — SODIUM CHLORIDE 9 MG/ML
1000 INJECTION, SOLUTION INTRAVENOUS
Refills: 0 | Status: DISCONTINUED | OUTPATIENT
Start: 2020-02-13 | End: 2020-02-21

## 2020-02-13 RX ORDER — INSULIN LISPRO 100/ML
VIAL (ML) SUBCUTANEOUS
Refills: 0 | Status: DISCONTINUED | OUTPATIENT
Start: 2020-02-13 | End: 2020-02-14

## 2020-02-13 RX ORDER — SPIRONOLACTONE 25 MG/1
50 TABLET, FILM COATED ORAL DAILY
Refills: 0 | Status: DISCONTINUED | OUTPATIENT
Start: 2020-02-13 | End: 2020-02-14

## 2020-02-13 RX ADMIN — Medication 10 MILLIGRAM(S): at 06:04

## 2020-02-13 RX ADMIN — ATORVASTATIN CALCIUM 10 MILLIGRAM(S): 80 TABLET, FILM COATED ORAL at 21:13

## 2020-02-13 RX ADMIN — Medication 15 MILLIGRAM(S): at 16:33

## 2020-02-13 RX ADMIN — Medication 15 MILLIGRAM(S): at 16:48

## 2020-02-13 RX ADMIN — Medication 10 MILLIGRAM(S): at 21:13

## 2020-02-13 RX ADMIN — CHLORHEXIDINE GLUCONATE 5 MILLILITER(S): 213 SOLUTION TOPICAL at 01:37

## 2020-02-13 RX ADMIN — AMLODIPINE BESYLATE 10 MILLIGRAM(S): 2.5 TABLET ORAL at 18:52

## 2020-02-13 RX ADMIN — PANTOPRAZOLE SODIUM 40 MILLIGRAM(S): 20 TABLET, DELAYED RELEASE ORAL at 13:34

## 2020-02-13 RX ADMIN — Medication 20 MILLIEQUIVALENT(S): at 14:35

## 2020-02-13 RX ADMIN — POLYETHYLENE GLYCOL 3350 17 GRAM(S): 17 POWDER, FOR SOLUTION ORAL at 13:34

## 2020-02-13 RX ADMIN — Medication 50 MILLIEQUIVALENT(S): at 16:33

## 2020-02-13 RX ADMIN — SPIRONOLACTONE 50 MILLIGRAM(S): 25 TABLET, FILM COATED ORAL at 19:57

## 2020-02-13 RX ADMIN — Medication 15 MILLIGRAM(S): at 06:04

## 2020-02-13 RX ADMIN — Medication 81 MILLIGRAM(S): at 06:04

## 2020-02-13 RX ADMIN — Medication 15 MILLIGRAM(S): at 06:15

## 2020-02-13 RX ADMIN — Medication 100 MILLIGRAM(S): at 09:14

## 2020-02-13 RX ADMIN — Medication 100 MILLIEQUIVALENT(S): at 06:05

## 2020-02-13 RX ADMIN — OXYCODONE AND ACETAMINOPHEN 1 TABLET(S): 5; 325 TABLET ORAL at 09:45

## 2020-02-13 RX ADMIN — CHLORHEXIDINE GLUCONATE 1 APPLICATION(S): 213 SOLUTION TOPICAL at 06:05

## 2020-02-13 RX ADMIN — WARFARIN SODIUM 5 MILLIGRAM(S): 2.5 TABLET ORAL at 13:34

## 2020-02-13 RX ADMIN — Medication 50 MILLIEQUIVALENT(S): at 17:34

## 2020-02-13 RX ADMIN — Medication 10 MILLIGRAM(S): at 13:34

## 2020-02-13 RX ADMIN — Medication 100 MILLIGRAM(S): at 16:33

## 2020-02-13 RX ADMIN — Medication 83.33 MILLIMOLE(S): at 04:17

## 2020-02-13 RX ADMIN — Medication 20 MILLIGRAM(S): at 18:53

## 2020-02-13 RX ADMIN — Medication 125 MILLILITER(S): at 14:35

## 2020-02-13 RX ADMIN — ENOXAPARIN SODIUM 40 MILLIGRAM(S): 100 INJECTION SUBCUTANEOUS at 13:34

## 2020-02-13 RX ADMIN — Medication 100 MILLIEQUIVALENT(S): at 06:04

## 2020-02-13 RX ADMIN — OXYCODONE AND ACETAMINOPHEN 1 TABLET(S): 5; 325 TABLET ORAL at 09:15

## 2020-02-13 NOTE — PROGRESS NOTE ADULT - SUBJECTIVE AND OBJECTIVE BOX
MAGGY LOPEZ  MRN-[id number]  Patient is a 85y old  Female who presents with a chief complaint of Mitral stenosis (11 Feb 2020 16:30)    HPI:  85F PMHx chronic afib ( last coumadin 3 days ago ), macular degeneration, HTN, HLD, glaucoma and mitral stenosis.  Patient presents with CASTREJON on exertion  TTE severe mitral .  Cardiac cath reviewed by Dr Hall.   OR am for MVR (11 Feb 2020 13:46)      Surgery/Hospital course:  2/12/2020 Mitral valve replacemant, tricuspid valve repair    remain intubated, full vent support  on primacor and dobutamine .   on and off pressors  paced rhythm due to slow a fib .       Physical Exam:  ICU Vital Signs Last 24 Hrs  T(C): 36.9 (13 Feb 2020 12:00), Max: 36.9 (13 Feb 2020 12:00)  T(F): 98.4 (13 Feb 2020 12:00), Max: 98.4 (13 Feb 2020 12:00)  HR: 80 (13 Feb 2020 13:00) (54 - 80)  BP: --  BP(mean): --  ABP: 120/48 (13 Feb 2020 13:00) (89/53 - 151/67)  ABP(mean): 70 (13 Feb 2020 13:00) (65 - 105)  RR: 16 (13 Feb 2020 13:00) (10 - 35)  SpO2: 97% (13 Feb 2020 13:00) (95% - 100%)    Gen:  intubated. sedated  CNS: sedated  Neck: no JVD  RES : clear , no wheezing    Chest:   + chest tubes                     CVS: jpaced rhythm. Normal S1/S2  Abd: Soft, non-distended. Bowel sounds present.  Skin: No rash.  Ext:  no edema, A Line  ============================I/O===========================  	  I&O's Detail    12 Feb 2020 07:01  -  13 Feb 2020 07:00  --------------------------------------------------------  IN:    Albumin 5%  - 250 mL: 250 mL    dexmedetomidine Infusion: 144.5 mL    DOBUTamine Infusion: 9 mL    DOBUTamine Infusion: 10.5 mL    DOBUTamine Infusion: 14.2 mL    DOBUTamine Infusion: 14 mL    insulin regular Infusion: 23 mL    IV PiggyBack: 933.2 mL    Lactated Ringers IV Bolus: 1000 mL    milrinone  Infusion: 14.2 mL    milrinone  Infusion: 5.3 mL    milrinone  Infusion: 14.2 mL    milrinone  Infusion: 75.1 mL    milrinone  Infusion: 21.2 mL    Packed Red Blood Cells: 350 mL    sodium chloride 0.9%.: 90 mL  Total IN: 2968.4 mL    OUT:    Bulb: 200 mL    Chest Tube: 115 mL    Indwelling Catheter - Urethral: 2940 mL  Total OUT: 3255 mL    Total NET: -286.6 mL      13 Feb 2020 07:01  -  13 Feb 2020 13:45  --------------------------------------------------------  IN:    insulin regular Infusion: 16 mL    IV PiggyBack: 299.9 mL    milrinone  Infusion: 59.4 mL    Oral Fluid: 250 mL    sodium chloride 0.9%.: 60 mL  Total IN: 685.3 mL    OUT:    Chest Tube: 120 mL    Indwelling Catheter - Urethral: 300 mL  Total OUT: 420 mL    Total NET: 265.3 mL            ============================ LABS =========================                        9.4    8.51  )-----------( 101      ( 13 Feb 2020 00:21 )             28.5   02-13    146<H>  |  108  |  18  ----------------------------<  166<H>  4.4   |  22  |  1.15    Ca    9.0      13 Feb 2020 00:21  Phos  1.5     02-13  Mg     3.1     02-13    TPro  5.4<L>  /  Alb  3.4  /  TBili  2.1<H>  /  DBili  x   /  AST  61<H>  /  ALT  19  /  AlkPhos  46  02-13        ======================Micro/Rad/Cardio============  CXR: Reviewed  Echo:Reviewed  ==============================================  PAST MEDICAL & SURGICAL HISTORY:  Macular degeneration  H/O impetigo  History of chronic atrial fibrillation  Mitral stenosis  S/P laser iridotomy  History of hysterectomy  History of appendectomy    ====================ASSESMENT ==============  2/12/2020 Mitral valve replacemant, tricuspid valve repair  Bradycardia  ventricualr pacing   acute systolic CHF  hemodynamic instability  Anemia blood loss  Hyperglycemia  Hyperlipidemia    Plan:  ====================== NEUROLOGY=====================  dexMEDEtomidine Infusion 1.5 MICROgram(s)/kG/Hr (17.625 mL/Hr) IV Continuous <Continuous>  meperidine     Injectable 25 milliGRAM(s) IV Push once  metoclopramide Injectable 10 milliGRAM(s) IV Push every 8 hours    ====================CARDIOVASCULAR==================  inotropic support . vasopressors   DOBUTamine Infusion 2.5 MICROgram(s)/kG/Min (3.525 mL/Hr) IV Continuous <Continuous>  milrinone Infusion 0.375 MICROgram(s)/kG/Min (5.287 mL/Hr) IV Continuous <Continuous>  norepinephrine Infusion 0.05 MICROgram(s)/kG/Min (4.406 mL/Hr) IV Continuous <Continuous>    ===================HEMATOLOGIC/ONC ===================  blood transfusion 1 prbc  monitor plts and Hb/Hct  ===================== RENAL =========================  Zuluaga for monitoring urine output    ==================== GASTROINTESTINAL===================  pantoprazole  Injectable 40 milliGRAM(s) IV Push daily  polyethylene glycol 3350 17 Gram(s) Oral daily  sodium chloride 0.9%. 1000 milliLiter(s) (10 mL/Hr) IV Continuous <Continuous>    =======================    ENDOCRINE  =====================  insulin regular Infusion 3 Unit(s)/Hr (3 mL/Hr) IV Continuous <Continuous>    ========================INFECTIOUS DISEASE================  cefuroxime  IVPB 1500 milliGRAM(s) IV Intermittent every 8 hours    Patient requires continuous monitoring with bedside rhythm monitoring,arterial line,pulse oximetry,ventilator monitoring;intermittent blood gas analysis.  Care plan discussed with ICU care team.  patient remain critical; required more than usual post op care; I have spent 35 minutes providing non routine post op care, revaluated multiple times during the day .

## 2020-02-13 NOTE — PHYSICAL THERAPY INITIAL EVALUATION ADULT - ADDITIONAL COMMENTS
Pt lives w/  in an elevator apt w/ no steps to negotiate. PTA pt was indep w/ all ADLs w/o an assist device.

## 2020-02-13 NOTE — AIRWAY REMOVAL NOTE  ADULT & PEDS - ARTIFICAL AIRWAY REMOVAL COMMENTS
Written order for extubation verified. The patient was identified by full name and birth date compared to the identification band. Present during the procedure was Britni MISHRA.

## 2020-02-13 NOTE — PHYSICAL THERAPY INITIAL EVALUATION ADULT - PERTINENT HX OF CURRENT PROBLEM, REHAB EVAL
85F PMHx chronic afib ( last coumadin 3 days ago ), macular degeneration, HTN, HLD, glaucoma and mitral stenosis.  Patient presents with CASTREJON on exertion  TTE severe mitral

## 2020-02-14 LAB
ALBUMIN SERPL ELPH-MCNC: 3.4 G/DL — SIGNIFICANT CHANGE UP (ref 3.3–5)
ALP SERPL-CCNC: 44 U/L — SIGNIFICANT CHANGE UP (ref 40–120)
ALT FLD-CCNC: 18 U/L — SIGNIFICANT CHANGE UP (ref 10–45)
ANION GAP SERPL CALC-SCNC: 11 MMOL/L — SIGNIFICANT CHANGE UP (ref 5–17)
APTT BLD: 32.6 SEC — SIGNIFICANT CHANGE UP (ref 27.5–36.3)
AST SERPL-CCNC: 48 U/L — HIGH (ref 10–40)
BILIRUB SERPL-MCNC: 1.1 MG/DL — SIGNIFICANT CHANGE UP (ref 0.2–1.2)
BUN SERPL-MCNC: 16 MG/DL — SIGNIFICANT CHANGE UP (ref 7–23)
CALCIUM SERPL-MCNC: 8.4 MG/DL — SIGNIFICANT CHANGE UP (ref 8.4–10.5)
CHLORIDE SERPL-SCNC: 99 MMOL/L — SIGNIFICANT CHANGE UP (ref 96–108)
CO2 SERPL-SCNC: 23 MMOL/L — SIGNIFICANT CHANGE UP (ref 22–31)
CREAT SERPL-MCNC: 1.25 MG/DL — SIGNIFICANT CHANGE UP (ref 0.5–1.3)
GAS PNL BLDA: SIGNIFICANT CHANGE UP
GLUCOSE SERPL-MCNC: 178 MG/DL — HIGH (ref 70–99)
HCT VFR BLD CALC: 26.4 % — LOW (ref 34.5–45)
HEPARIN-PF4 AB RESULT: <0.6 U/ML — SIGNIFICANT CHANGE UP (ref 0–0.9)
HGB BLD-MCNC: 9 G/DL — LOW (ref 11.5–15.5)
INR BLD: 1.31 RATIO — HIGH (ref 0.88–1.16)
INR BLD: 1.54 RATIO — HIGH (ref 0.88–1.16)
MAGNESIUM SERPL-MCNC: 2.3 MG/DL — SIGNIFICANT CHANGE UP (ref 1.6–2.6)
MCHC RBC-ENTMCNC: 30.6 PG — SIGNIFICANT CHANGE UP (ref 27–34)
MCHC RBC-ENTMCNC: 34.1 GM/DL — SIGNIFICANT CHANGE UP (ref 32–36)
MCV RBC AUTO: 89.8 FL — SIGNIFICANT CHANGE UP (ref 80–100)
NRBC # BLD: 0 /100 WBCS — SIGNIFICANT CHANGE UP (ref 0–0)
PF4 HEPARIN CMPLX AB SER-ACNC: NEGATIVE — SIGNIFICANT CHANGE UP
PHOSPHATE SERPL-MCNC: 3.5 MG/DL — SIGNIFICANT CHANGE UP (ref 2.5–4.5)
PLATELET # BLD AUTO: 86 K/UL — LOW (ref 150–400)
POTASSIUM SERPL-MCNC: 4.2 MMOL/L — SIGNIFICANT CHANGE UP (ref 3.5–5.3)
POTASSIUM SERPL-SCNC: 4.2 MMOL/L — SIGNIFICANT CHANGE UP (ref 3.5–5.3)
PROT SERPL-MCNC: 5.8 G/DL — LOW (ref 6–8.3)
PROTHROM AB SERPL-ACNC: 15.2 SEC — HIGH (ref 10–12.9)
PROTHROM AB SERPL-ACNC: 17.8 SEC — HIGH (ref 10–12.9)
RBC # BLD: 2.94 M/UL — LOW (ref 3.8–5.2)
RBC # FLD: 15.9 % — HIGH (ref 10.3–14.5)
SODIUM SERPL-SCNC: 133 MMOL/L — LOW (ref 135–145)
WBC # BLD: 14.92 K/UL — HIGH (ref 3.8–10.5)
WBC # FLD AUTO: 14.92 K/UL — HIGH (ref 3.8–10.5)

## 2020-02-14 PROCEDURE — 71045 X-RAY EXAM CHEST 1 VIEW: CPT | Mod: 26

## 2020-02-14 PROCEDURE — 93010 ELECTROCARDIOGRAM REPORT: CPT

## 2020-02-14 PROCEDURE — 99233 SBSQ HOSP IP/OBS HIGH 50: CPT

## 2020-02-14 PROCEDURE — 99291 CRITICAL CARE FIRST HOUR: CPT

## 2020-02-14 RX ORDER — POTASSIUM CHLORIDE 20 MEQ
20 PACKET (EA) ORAL ONCE
Refills: 0 | Status: COMPLETED | OUTPATIENT
Start: 2020-02-14 | End: 2020-02-14

## 2020-02-14 RX ORDER — INSULIN LISPRO 100/ML
VIAL (ML) SUBCUTANEOUS
Refills: 0 | Status: DISCONTINUED | OUTPATIENT
Start: 2020-02-14 | End: 2020-02-15

## 2020-02-14 RX ORDER — HYDRALAZINE HCL 50 MG
10 TABLET ORAL ONCE
Refills: 0 | Status: COMPLETED | OUTPATIENT
Start: 2020-02-14 | End: 2020-02-14

## 2020-02-14 RX ORDER — INSULIN LISPRO 100/ML
VIAL (ML) SUBCUTANEOUS
Refills: 0 | Status: DISCONTINUED | OUTPATIENT
Start: 2020-02-14 | End: 2020-02-17

## 2020-02-14 RX ORDER — POTASSIUM CHLORIDE 20 MEQ
10 PACKET (EA) ORAL ONCE
Refills: 0 | Status: COMPLETED | OUTPATIENT
Start: 2020-02-14 | End: 2020-02-14

## 2020-02-14 RX ORDER — SPIRONOLACTONE 25 MG/1
25 TABLET, FILM COATED ORAL
Refills: 0 | Status: DISCONTINUED | OUTPATIENT
Start: 2020-02-14 | End: 2020-02-15

## 2020-02-14 RX ORDER — WARFARIN SODIUM 2.5 MG/1
5 TABLET ORAL ONCE
Refills: 0 | Status: COMPLETED | OUTPATIENT
Start: 2020-02-14 | End: 2020-02-14

## 2020-02-14 RX ORDER — HYDRALAZINE HCL 50 MG
5 TABLET ORAL ONCE
Refills: 0 | Status: COMPLETED | OUTPATIENT
Start: 2020-02-14 | End: 2020-02-14

## 2020-02-14 RX ORDER — INSULIN HUMAN 100 [IU]/ML
3 INJECTION, SOLUTION SUBCUTANEOUS
Qty: 100 | Refills: 0 | Status: DISCONTINUED | OUTPATIENT
Start: 2020-02-14 | End: 2020-02-16

## 2020-02-14 RX ORDER — INSULIN HUMAN 100 [IU]/ML
5 INJECTION, SOLUTION SUBCUTANEOUS ONCE
Refills: 0 | Status: COMPLETED | OUTPATIENT
Start: 2020-02-14 | End: 2020-02-14

## 2020-02-14 RX ADMIN — Medication 10 MILLIGRAM(S): at 17:07

## 2020-02-14 RX ADMIN — Medication 2: at 08:13

## 2020-02-14 RX ADMIN — INSULIN HUMAN 3 UNIT(S)/HR: 100 INJECTION, SOLUTION SUBCUTANEOUS at 16:46

## 2020-02-14 RX ADMIN — Medication 50 MILLIEQUIVALENT(S): at 01:23

## 2020-02-14 RX ADMIN — MILRINONE LACTATE 5.64 MICROGRAM(S)/KG/MIN: 1 INJECTION, SOLUTION INTRAVENOUS at 08:15

## 2020-02-14 RX ADMIN — Medication 5 MILLIGRAM(S): at 06:23

## 2020-02-14 RX ADMIN — Medication 4: at 13:39

## 2020-02-14 RX ADMIN — Medication 2.5 MILLIGRAM(S): at 06:53

## 2020-02-14 RX ADMIN — Medication 100 MILLIGRAM(S): at 00:50

## 2020-02-14 RX ADMIN — OXYCODONE AND ACETAMINOPHEN 1 TABLET(S): 5; 325 TABLET ORAL at 21:00

## 2020-02-14 RX ADMIN — Medication 2.5 MILLIGRAM(S): at 05:36

## 2020-02-14 RX ADMIN — INSULIN HUMAN 5 UNIT(S): 100 INJECTION, SOLUTION SUBCUTANEOUS at 16:12

## 2020-02-14 RX ADMIN — INSULIN HUMAN 3 UNIT(S)/HR: 100 INJECTION, SOLUTION SUBCUTANEOUS at 19:52

## 2020-02-14 RX ADMIN — CHLORHEXIDINE GLUCONATE 1 APPLICATION(S): 213 SOLUTION TOPICAL at 05:36

## 2020-02-14 RX ADMIN — POLYETHYLENE GLYCOL 3350 17 GRAM(S): 17 POWDER, FOR SOLUTION ORAL at 11:42

## 2020-02-14 RX ADMIN — Medication 20 MILLIEQUIVALENT(S): at 21:01

## 2020-02-14 RX ADMIN — ATORVASTATIN CALCIUM 10 MILLIGRAM(S): 80 TABLET, FILM COATED ORAL at 21:00

## 2020-02-14 RX ADMIN — Medication 5 MILLIGRAM(S): at 06:01

## 2020-02-14 RX ADMIN — WARFARIN SODIUM 5 MILLIGRAM(S): 2.5 TABLET ORAL at 17:54

## 2020-02-14 RX ADMIN — MILRINONE LACTATE 5.64 MICROGRAM(S)/KG/MIN: 1 INJECTION, SOLUTION INTRAVENOUS at 19:48

## 2020-02-14 RX ADMIN — Medication 5 MILLIGRAM(S): at 08:36

## 2020-02-14 RX ADMIN — SPIRONOLACTONE 25 MILLIGRAM(S): 25 TABLET, FILM COATED ORAL at 17:08

## 2020-02-14 RX ADMIN — OXYCODONE AND ACETAMINOPHEN 1 TABLET(S): 5; 325 TABLET ORAL at 21:30

## 2020-02-14 RX ADMIN — Medication 10 MILLIGRAM(S): at 13:40

## 2020-02-14 RX ADMIN — Medication 10 MILLIGRAM(S): at 14:41

## 2020-02-14 RX ADMIN — Medication 10 MILLIGRAM(S): at 17:54

## 2020-02-14 RX ADMIN — Medication 10 MILLIGRAM(S): at 05:36

## 2020-02-14 RX ADMIN — PANTOPRAZOLE SODIUM 40 MILLIGRAM(S): 20 TABLET, DELAYED RELEASE ORAL at 08:14

## 2020-02-14 RX ADMIN — SPIRONOLACTONE 25 MILLIGRAM(S): 25 TABLET, FILM COATED ORAL at 06:17

## 2020-02-14 RX ADMIN — Medication 81 MILLIGRAM(S): at 11:42

## 2020-02-14 RX ADMIN — MILRINONE LACTATE 5.64 MICROGRAM(S)/KG/MIN: 1 INJECTION, SOLUTION INTRAVENOUS at 00:50

## 2020-02-14 NOTE — PROGRESS NOTE ADULT - SUBJECTIVE AND OBJECTIVE BOX
MAGGY LOPEZ  MRN#: [id number]    85F PMHx chronic afib (last coumadin 3 days ago), macular degeneration, HTN, HLD, glaucoma and mitral stenosis with recent symptoms of CASTREJON, now recovering s/p MVR/TVA . Patient remains on a Milrinone infusion.    OBJECTIVE:  ICU Vital Signs Last 24 Hrs  T(C): 37.8 (2020 03:00), Max: 38 (2020 19:00)  T(F): 100 (2020 03:00), Max: 100.4 (2020 19:00)  HR: 85 (2020 06:00) (74 - 95)  BP: --  BP(mean): --  ABP: 139/59 (2020 06:00) (99/45 - 155/64)  ABP(mean): 88 (2020 06:00) (63 - 93)  RR: 27 (:00) (15 - 35)  SpO2: 95% (2020 06:00) (89% - 100%)     @ 07:  -  13 @ 07:00  --------------------------------------------------------  IN: 2968.4 mL / OUT: 3255 mL / NET: -286.6 mL     @ 07:14 @ 06:09  --------------------------------------------------------  IN: 1972.1 mL / OUT: 1910 mL / NET: 62.1 mL    PHYSICAL EXAM:     Daily Weight in k.8 (2020 00:00)  General: WN/WD NAD    HEENT:     + NCAT  + EOMI  - Conjuctival edema   - Icterus   - Thrush   - ETT  - NGT/OGT  Neck:         + FROM    - JVD     - Nodes     - Masses    + Mid-line trachea   - Tracheostomy  Chest:         - Sternal click  - Sternal drainage  + Pacing wires  + Chest tubes  - SubQ emphysema  Lungs:          + CTA   - Rhonchi    - Rales    - Wheezing     - Decreased BS   - Dullness R L  Cardiac:       + S1 + S2    - RRR   + Irregular   - S3  - S4    - Murmurs   - Rub   - Hamman’s sign   Abdomen:    + BS     + Soft    + Non-tender     - Distended    - Organomegaly  - PEG  Extremities:   - Cyanosis U/L   - Clubbing  U/L  - LE/UE Edema   + Capillary refill    + Pulses   Neuro:        + Awake   +  Alert   - Confused   - Lethargic   - Sedated   - Generalized Weakness  Skin:        - Rashes    - Erythema   + Normal incisions   + IV sites intact  - Sacral decubitus    HOSPITAL MEDICATIONS: All medications reviewed and analyzed  MEDICATIONS  (STANDING):  amLODIPine   Tablet 10 milliGRAM(s) Oral daily  aspirin enteric coated 81 milliGRAM(s) Oral daily  atorvastatin 10 milliGRAM(s) Oral at bedtime  chlorhexidine 2% Cloths 1 Application(s) Topical <User Schedule>  dextrose 5%. 1000 milliLiter(s) (50 mL/Hr) IV Continuous <Continuous>  dextrose 50% Injectable 12.5 Gram(s) IV Push once  dextrose 50% Injectable 25 Gram(s) IV Push once  dextrose 50% Injectable 25 Gram(s) IV Push once  enalapril 2.5 milliGRAM(s) Oral daily  insulin lispro (HumaLOG) corrective regimen sliding scale   SubCutaneous three times a day before meals  insulin lispro (HumaLOG) corrective regimen sliding scale   SubCutaneous at bedtime  metoclopramide Injectable 10 milliGRAM(s) IV Push every 8 hours  milrinone Infusion 0.4 MICROgram(s)/kG/Min (5.64 mL/Hr) IV Continuous <Continuous>  pantoprazole    Tablet 40 milliGRAM(s) Oral before breakfast  polyethylene glycol 3350 17 Gram(s) Oral daily  potassium chloride  10 mEq/50 mL IVPB 10 milliEquivalent(s) IV Intermittent every 1 hour  potassium chloride  10 mEq/50 mL IVPB 10 milliEquivalent(s) IV Intermittent every 1 hour  potassium chloride  10 mEq/50 mL IVPB 10 milliEquivalent(s) IV Intermittent every 1 hour  sodium chloride 0.9%. 1000 milliLiter(s) (10 mL/Hr) IV Continuous <Continuous>  spironolactone 25 milliGRAM(s) Oral two times a day  torsemide 5 milliGRAM(s) Oral daily    MEDICATIONS  (PRN):  dextrose 40% Gel 15 Gram(s) Oral once PRN Blood Glucose LESS THAN 70 milliGRAM(s)/deciliter  glucagon  Injectable 1 milliGRAM(s) IntraMuscular once PRN Glucose LESS THAN 70 milligrams/deciliter  ketorolac   Injectable 15 milliGRAM(s) IV Push every 8 hours PRN Moderate Pain (4 - 6)  oxycodone    5 mG/acetaminophen 325 mG 1 Tablet(s) Oral every 6 hours PRN Severe Pain (7 - 10)    LABS: All Lab data reviewed and analyzed                        9.0    14.92 )-----------( 86       ( 2020 01:05 )             26.4    02-14    133<L>  |  99  |  16  ----------------------------<  178<H>  4.2   |  23  |  1.25    Ca    8.4      2020 01:05  Phos  3.5     02-  Mg     2.3     -14    TPro  5.8<L>  /  Alb  3.4  /  TBili  1.1  /  DBili  x   /  AST  48<H>  /  ALT  18  /  AlkPhos  44  02-14    PT/INR - ( 2020 01:05 )   PT: 17.8 sec;   INR: 1.54 ratio         PTT - ( 2020 01:05 )  PTT:32.6 sec LIVER FUNCTIONS - ( 2020 01:05 )  Alb: 3.4 g/dL / Pro: 5.8 g/dL / ALK PHOS: 44 U/L / ALT: 18 U/L / AST: 48 U/L / GGT: x           RADIOLOGY: - Reviewed and analyzed     Recovering s/p MVR/TVA, remains on Milrinone infusion for inotropic support due to risk of decompensation post valve replacement with associated increased intraventricular pressure, Norvasc initiated  and Vasotec started this morning for hypertension.   In addition, patient has been started on Aldactone and Torsemide  Respiratory status required supplemental oxygen, close monitoring of breathing pattern and respiratory rate & the following of continuous pulse oximetry for support & to prevent decompensation  Continued mobilization as tolerated  Addressed analgesic regimen to optimize function  ASA continued to prevent thromboembolism s/p MVR in addition to daily coumadin  Lipitor was also continued for long term graft patency  Lovenox continued for VTE prophylaxis in addition to sequential compression devices  Protonix maintained for GI bleeding prophylaxis  Metabolic stability & infection prophylaxis required review and adjustment of regular Insulin sliding scale and glycemic regimen while following serial glucose levels to help achieve & maintain euglycemia  Reviewed & addressed surgical site infection prophylaxis regimen MAGGY LOPEZ  MRN#: [id number]    85F PMHx chronic afib (last coumadin 3 days ago), macular degeneration, HTN, HLD, glaucoma and mitral stenosis with recent symptoms of CASTREJON, now recovering s/p MVR/TVA . Patient remains on a Milrinone infusion.    OBJECTIVE:  ICU Vital Signs Last 24 Hrs  T(C): 37.8 (2020 03:00), Max: 38 (2020 19:00)  T(F): 100 (2020 03:00), Max: 100.4 (2020 19:00)  HR: 85 (2020 06:00) (74 - 95)  BP: --  BP(mean): --  ABP: 139/59 (2020 06:00) (99/45 - 155/64)  ABP(mean): 88 (2020 06:00) (63 - 93)  RR: 27 (:00) (15 - 35)  SpO2: 95% (2020 06:00) (89% - 100%)     @ 07:  -  13 @ 07:00  --------------------------------------------------------  IN: 2968.4 mL / OUT: 3255 mL / NET: -286.6 mL     @ 07:14 @ 06:09  --------------------------------------------------------  IN: 1972.1 mL / OUT: 1910 mL / NET: 62.1 mL    PHYSICAL EXAM:     Daily Weight in k.8 (2020 00:00)  General: WN/WD NAD    HEENT:     + NCAT  + EOMI  - Conjuctival edema   - Icterus   - Thrush   - ETT  - NGT/OGT  Neck:         + FROM    - JVD     - Nodes     - Masses    + Mid-line trachea   - Tracheostomy  Chest:         - Sternal click  - Sternal drainage  + Pacing wires  + Chest tubes  - SubQ emphysema  Lungs:          + CTA   - Rhonchi    - Rales    - Wheezing     - Decreased BS   - Dullness R L  Cardiac:       + S1 + S2    - RRR   + Irregular   - S3  - S4    - Murmurs   - Rub   - Hamman’s sign   Abdomen:    + BS     + Soft    + Non-tender     - Distended    - Organomegaly  - PEG  Extremities:   - Cyanosis U/L   - Clubbing  U/L  - LE/UE Edema   + Capillary refill    + Pulses   Neuro:        + Awake   +  Alert   - Confused   - Lethargic   - Sedated   - Generalized Weakness  Skin:        - Rashes    - Erythema   + Normal incisions   + IV sites intact  - Sacral decubitus    HOSPITAL MEDICATIONS: All medications reviewed and analyzed  MEDICATIONS  (STANDING):  amLODIPine   Tablet 10 milliGRAM(s) Oral daily  aspirin enteric coated 81 milliGRAM(s) Oral daily  atorvastatin 10 milliGRAM(s) Oral at bedtime  chlorhexidine 2% Cloths 1 Application(s) Topical <User Schedule>  dextrose 5%. 1000 milliLiter(s) (50 mL/Hr) IV Continuous <Continuous>  dextrose 50% Injectable 12.5 Gram(s) IV Push once  dextrose 50% Injectable 25 Gram(s) IV Push once  dextrose 50% Injectable 25 Gram(s) IV Push once  enalapril 2.5 milliGRAM(s) Oral daily  insulin lispro (HumaLOG) corrective regimen sliding scale   SubCutaneous three times a day before meals  insulin lispro (HumaLOG) corrective regimen sliding scale   SubCutaneous at bedtime  metoclopramide Injectable 10 milliGRAM(s) IV Push every 8 hours  milrinone Infusion 0.4 MICROgram(s)/kG/Min (5.64 mL/Hr) IV Continuous <Continuous>  pantoprazole    Tablet 40 milliGRAM(s) Oral before breakfast  polyethylene glycol 3350 17 Gram(s) Oral daily  potassium chloride  10 mEq/50 mL IVPB 10 milliEquivalent(s) IV Intermittent every 1 hour  potassium chloride  10 mEq/50 mL IVPB 10 milliEquivalent(s) IV Intermittent every 1 hour  potassium chloride  10 mEq/50 mL IVPB 10 milliEquivalent(s) IV Intermittent every 1 hour  sodium chloride 0.9%. 1000 milliLiter(s) (10 mL/Hr) IV Continuous <Continuous>  spironolactone 25 milliGRAM(s) Oral two times a day  torsemide 5 milliGRAM(s) Oral daily    MEDICATIONS  (PRN):  dextrose 40% Gel 15 Gram(s) Oral once PRN Blood Glucose LESS THAN 70 milliGRAM(s)/deciliter  glucagon  Injectable 1 milliGRAM(s) IntraMuscular once PRN Glucose LESS THAN 70 milligrams/deciliter  ketorolac   Injectable 15 milliGRAM(s) IV Push every 8 hours PRN Moderate Pain (4 - 6)  oxycodone    5 mG/acetaminophen 325 mG 1 Tablet(s) Oral every 6 hours PRN Severe Pain (7 - 10)    LABS: All Lab data reviewed and analyzed                        9.0    14.92 )-----------( 86       ( 2020 01:05 )             26.4    02-14    133<L>  |  99  |  16  ----------------------------<  178<H>  4.2   |  23  |  1.25    Ca    8.4      2020 01:05  Phos  3.5     02-  Mg     2.3     -14    TPro  5.8<L>  /  Alb  3.4  /  TBili  1.1  /  DBili  x   /  AST  48<H>  /  ALT  18  /  AlkPhos  44  02-14    PT/INR - ( 2020 01:05 )   PT: 17.8 sec;   INR: 1.54 ratio         PTT - ( :05 )  PTT:32.6 sec LIVER FUNCTIONS - ( 2020 01:05 )  Alb: 3.4 g/dL / Pro: 5.8 g/dL / ALK PHOS: 44 U/L / ALT: 18 U/L / AST: 48 U/L / GGT: x           RADIOLOGY: - Reviewed and analyzed     Recovering s/p MVR/TVA, remains on Milrinone infusion for inotropic support due acute to RV systolic dysfunction and risk of LV dilatation and decompensation post valve replacement with associated increased intraventricular pressure, Norvasc initiated  and Vasotec started this morning for hypertension.   In addition, patient has been started on Aldactone and Torsemide  Respiratory status required supplemental oxygen, close monitoring of breathing pattern and respiratory rate & the following of continuous pulse oximetry for support & to prevent decompensation  Continued mobilization as tolerated  Addressed analgesic regimen to optimize function  ASA continued to prevent thromboembolism s/p MVR in addition to daily coumadin  Lipitor was also continued for long term graft patency  Lovenox continued for VTE prophylaxis in addition to sequential compression devices  Protonix maintained for GI bleeding prophylaxis  Anemia due to acute blood loss, no current indication for transfusion  Metabolic stability & infection prophylaxis required review and adjustment of regular Insulin sliding scale and glycemic regimen while following serial glucose levels to help achieve & maintain euglycemia  Reviewed & addressed surgical site infection prophylaxis regimen

## 2020-02-14 NOTE — PROGRESS NOTE ADULT - SUBJECTIVE AND OBJECTIVE BOX
MAGGY LOPEZ  MRN#: [id number]    85F PMHx chronic afib (last coumadin 3 days ago), macular degeneration, HTN, HLD, glaucoma and mitral stenosis with recent symptoms of CASTREJON, now recovering s/p MVR/TVA . Patient remains on a Milrinone infusion.    OBJECTIVE:  ICU Vital Signs Last 24 Hrs  T(C): 37.4 (2020 08:00), Max: 38 (2020 19:00)  T(F): 99.3 (2020 08:00), Max: 100.4 (2020 19:00)  HR: 81 (2020 10:00) (74 - 97)  BP: --  BP(mean): --  ABP: 114/51 (2020 10:00) (99/45 - 155/64)  ABP(mean): 73 (2020 10:00) (63 - 93)  RR: 23 (2020 10:00) (15 - 36)  SpO2: 97% (2020 10:00) (89% - 100%)    I&O's Detail    2020 07:01  -  2020 07:00  --------------------------------------------------------  IN:    Albumin 5%  - 250 mL: 250 mL    insulin regular Infusion: 30 mL    IV PiggyBack: 549.9 mL    milrinone  Infusion: 11.2 mL    milrinone  Infusion: 16.8 mL    milrinone  Infusion: 11.2 mL    milrinone  Infusion: 118.7 mL    milrinone  Infusion: 25.5 mL    Oral Fluid: 750 mL    sodium chloride 0.9%.: 240 mL  Total IN: 2003.3 mL    OUT:    Bulb: 105 mL    Chest Tube: 460 mL    Indwelling Catheter - Urethral: 1550 mL  Total OUT: 2115 mL    Total NET: -111.7 mL      2020 07:01  -  2020 11:20  --------------------------------------------------------  IN:    milrinone  Infusion: 16.8 mL    Oral Fluid: 200 mL    sodium chloride 0.9%.: 30 mL  Total IN: 246.8 mL    OUT:    Chest Tube: 80 mL    Indwelling Catheter - Urethral: 435 mL  Total OUT: 515 mL    Total NET: -268.2 mL      PHYSICAL EXAM:     Daily Weight in k.8 (2020 00:00)  General: WN/WD NAD    HEENT:     + NCAT  + EOMI  - Conjuctival edema   - Icterus   - Thrush   - ETT  - NGT/OGT  Neck:         + FROM    - JVD     - Nodes     - Masses    + Mid-line trachea   - Tracheostomy  Chest:         - Sternal click  - Sternal drainage  + Pacing wires  + Chest tubes  - SubQ emphysema  Lungs:          + CTA   - Rhonchi    - Rales    - Wheezing     - Decreased BS   - Dullness R L  Cardiac:       + S1 + S2    - RRR   + Irregular   - S3  - S4    - Murmurs   - Rub   - Hamman’s sign   Abdomen:    + BS     + Soft    + Non-tender     - Distended    - Organomegaly  - PEG  Extremities:   - Cyanosis U/L   - Clubbing  U/L  - LE/UE Edema   + Capillary refill    + Pulses   Neuro:        + Awake   +  Alert   - Confused   - Lethargic   - Sedated   - Generalized Weakness  Skin:        - Rashes    - Erythema   + Normal incisions   + IV sites intact  - Sacral decubitus      MEDICATIONS  (STANDING):  amLODIPine   Tablet 10 milliGRAM(s) Oral daily  aspirin enteric coated 81 milliGRAM(s) Oral daily  atorvastatin 10 milliGRAM(s) Oral at bedtime  chlorhexidine 2% Cloths 1 Application(s) Topical <User Schedule>  dextrose 5%. 1000 milliLiter(s) (50 mL/Hr) IV Continuous <Continuous>  dextrose 50% Injectable 12.5 Gram(s) IV Push once  dextrose 50% Injectable 25 Gram(s) IV Push once  dextrose 50% Injectable 25 Gram(s) IV Push once  enalapril 5 milliGRAM(s) Oral every 12 hours  insulin lispro (HumaLOG) corrective regimen sliding scale   SubCutaneous three times a day before meals  insulin lispro (HumaLOG) corrective regimen sliding scale   SubCutaneous at bedtime  metoclopramide Injectable 10 milliGRAM(s) IV Push every 8 hours  milrinone Infusion 0.4 MICROgram(s)/kG/Min (5.64 mL/Hr) IV Continuous <Continuous>  pantoprazole    Tablet 40 milliGRAM(s) Oral before breakfast  polyethylene glycol 3350 17 Gram(s) Oral daily  potassium chloride  10 mEq/50 mL IVPB 10 milliEquivalent(s) IV Intermittent every 1 hour  potassium chloride  10 mEq/50 mL IVPB 10 milliEquivalent(s) IV Intermittent every 1 hour  potassium chloride  10 mEq/50 mL IVPB 10 milliEquivalent(s) IV Intermittent every 1 hour  sodium chloride 0.9%. 1000 milliLiter(s) (10 mL/Hr) IV Continuous <Continuous>  spironolactone 25 milliGRAM(s) Oral two times a day  torsemide 10 milliGRAM(s) Oral every 12 hours      LABS: All Lab data reviewed and analyzed                          9.0     )-----------( 86       ( 2020 01:05 )             26.4   02-14    133<L>  |  99  |  16  ----------------------------<  178<H>  4.2   |  23  |  1.25    Ca    8.4      2020 01:05  Phos  3.5     02-14  Mg     2.3     02-14    TPro  5.8<L>  /  Alb  3.4  /  TBili  1.1  /  DBili  x   /  AST  48<H>  /  ALT  18  /  AlkPhos  44  02-14           RADIOLOGY: - Reviewed and analyzed     Recovering s/p MVR/TVA, remains on Milrinone infusion for inotropic support due acute to RV systolic dysfunction and risk of LV dilatation and decompensation post valve replacement with associated increased intraventricular pressure, Norvasc initiated  and Vasotec started this morning for hypertension.   In addition, patient has been started on Aldactone and Torsemide  Respiratory status required supplemental oxygen, close monitoring of breathing pattern and respiratory rate & the following of continuous pulse oximetry for support & to prevent decompensation  Continued mobilization as tolerated  Addressed analgesic regimen to optimize function  ASA continued to prevent thromboembolism s/p MVR in addition to daily coumadin  Lipitor was also continued for long term graft patency  Lovenox continued for VTE prophylaxis in addition to sequential compression devices  Protonix maintained for GI bleeding prophylaxis  Anemia due to acute blood loss, no current indication for transfusion  Metabolic stability & infection prophylaxis required review and adjustment of regular Insulin sliding scale and glycemic regimen while following serial glucose levels to help achieve & maintain euglycemia  Reviewed & addressed surgical site infection prophylaxis regimen

## 2020-02-15 LAB
ALBUMIN SERPL ELPH-MCNC: 3.2 G/DL — LOW (ref 3.3–5)
ALP SERPL-CCNC: 43 U/L — SIGNIFICANT CHANGE UP (ref 40–120)
ALT FLD-CCNC: 18 U/L — SIGNIFICANT CHANGE UP (ref 10–45)
ANION GAP SERPL CALC-SCNC: 11 MMOL/L — SIGNIFICANT CHANGE UP (ref 5–17)
ANION GAP SERPL CALC-SCNC: 11 MMOL/L — SIGNIFICANT CHANGE UP (ref 5–17)
APTT BLD: 27.4 SEC — LOW (ref 27.5–36.3)
AST SERPL-CCNC: 30 U/L — SIGNIFICANT CHANGE UP (ref 10–40)
BILIRUB SERPL-MCNC: 1 MG/DL — SIGNIFICANT CHANGE UP (ref 0.2–1.2)
BUN SERPL-MCNC: 17 MG/DL — SIGNIFICANT CHANGE UP (ref 7–23)
BUN SERPL-MCNC: 20 MG/DL — SIGNIFICANT CHANGE UP (ref 7–23)
CALCIUM SERPL-MCNC: 8.7 MG/DL — SIGNIFICANT CHANGE UP (ref 8.4–10.5)
CALCIUM SERPL-MCNC: 9.3 MG/DL — SIGNIFICANT CHANGE UP (ref 8.4–10.5)
CHLORIDE SERPL-SCNC: 97 MMOL/L — SIGNIFICANT CHANGE UP (ref 96–108)
CHLORIDE SERPL-SCNC: 99 MMOL/L — SIGNIFICANT CHANGE UP (ref 96–108)
CO2 SERPL-SCNC: 25 MMOL/L — SIGNIFICANT CHANGE UP (ref 22–31)
CO2 SERPL-SCNC: 25 MMOL/L — SIGNIFICANT CHANGE UP (ref 22–31)
CREAT SERPL-MCNC: 1.36 MG/DL — HIGH (ref 0.5–1.3)
CREAT SERPL-MCNC: 1.43 MG/DL — HIGH (ref 0.5–1.3)
GAS PNL BLDA: SIGNIFICANT CHANGE UP
GAS PNL BLDA: SIGNIFICANT CHANGE UP
GLUCOSE SERPL-MCNC: 152 MG/DL — HIGH (ref 70–99)
GLUCOSE SERPL-MCNC: 176 MG/DL — HIGH (ref 70–99)
HCT VFR BLD CALC: 28.5 % — LOW (ref 34.5–45)
HGB BLD-MCNC: 9.2 G/DL — LOW (ref 11.5–15.5)
INR BLD: 1.26 RATIO — HIGH (ref 0.88–1.16)
INR BLD: 1.45 RATIO — HIGH (ref 0.88–1.16)
MAGNESIUM SERPL-MCNC: 2.1 MG/DL — SIGNIFICANT CHANGE UP (ref 1.6–2.6)
MCHC RBC-ENTMCNC: 29.2 PG — SIGNIFICANT CHANGE UP (ref 27–34)
MCHC RBC-ENTMCNC: 32.3 GM/DL — SIGNIFICANT CHANGE UP (ref 32–36)
MCV RBC AUTO: 90.5 FL — SIGNIFICANT CHANGE UP (ref 80–100)
NRBC # BLD: 0 /100 WBCS — SIGNIFICANT CHANGE UP (ref 0–0)
PHOSPHATE SERPL-MCNC: 3.1 MG/DL — SIGNIFICANT CHANGE UP (ref 2.5–4.5)
PLATELET # BLD AUTO: 87 K/UL — LOW (ref 150–400)
POTASSIUM SERPL-MCNC: 4.4 MMOL/L — SIGNIFICANT CHANGE UP (ref 3.5–5.3)
POTASSIUM SERPL-MCNC: 4.4 MMOL/L — SIGNIFICANT CHANGE UP (ref 3.5–5.3)
POTASSIUM SERPL-SCNC: 4.4 MMOL/L — SIGNIFICANT CHANGE UP (ref 3.5–5.3)
POTASSIUM SERPL-SCNC: 4.4 MMOL/L — SIGNIFICANT CHANGE UP (ref 3.5–5.3)
PROT SERPL-MCNC: 5.9 G/DL — LOW (ref 6–8.3)
PROTHROM AB SERPL-ACNC: 14.6 SEC — HIGH (ref 10–12.9)
PROTHROM AB SERPL-ACNC: 16.7 SEC — HIGH (ref 10–12.9)
RBC # BLD: 3.15 M/UL — LOW (ref 3.8–5.2)
RBC # FLD: 15.7 % — HIGH (ref 10.3–14.5)
SODIUM SERPL-SCNC: 133 MMOL/L — LOW (ref 135–145)
SODIUM SERPL-SCNC: 135 MMOL/L — SIGNIFICANT CHANGE UP (ref 135–145)
WBC # BLD: 14.73 K/UL — HIGH (ref 3.8–10.5)
WBC # FLD AUTO: 14.73 K/UL — HIGH (ref 3.8–10.5)

## 2020-02-15 PROCEDURE — 99233 SBSQ HOSP IP/OBS HIGH 50: CPT

## 2020-02-15 PROCEDURE — 71045 X-RAY EXAM CHEST 1 VIEW: CPT | Mod: 26,76

## 2020-02-15 RX ORDER — HYDRALAZINE HCL 50 MG
25 TABLET ORAL EVERY 8 HOURS
Refills: 0 | Status: DISCONTINUED | OUTPATIENT
Start: 2020-02-15 | End: 2020-02-15

## 2020-02-15 RX ORDER — PHENYLEPHRINE HYDROCHLORIDE 10 MG/ML
1 INJECTION INTRAVENOUS
Qty: 40 | Refills: 0 | Status: DISCONTINUED | OUTPATIENT
Start: 2020-02-15 | End: 2020-02-16

## 2020-02-15 RX ORDER — ALBUMIN HUMAN 25 %
250 VIAL (ML) INTRAVENOUS ONCE
Refills: 0 | Status: COMPLETED | OUTPATIENT
Start: 2020-02-15 | End: 2020-02-15

## 2020-02-15 RX ORDER — BUDESONIDE, MICRONIZED 100 %
0.5 POWDER (GRAM) MISCELLANEOUS EVERY 12 HOURS
Refills: 0 | Status: DISCONTINUED | OUTPATIENT
Start: 2020-02-15 | End: 2020-02-22

## 2020-02-15 RX ORDER — WARFARIN SODIUM 2.5 MG/1
5 TABLET ORAL ONCE
Refills: 0 | Status: COMPLETED | OUTPATIENT
Start: 2020-02-15 | End: 2020-02-15

## 2020-02-15 RX ORDER — PIPERACILLIN AND TAZOBACTAM 4; .5 G/20ML; G/20ML
3.38 INJECTION, POWDER, LYOPHILIZED, FOR SOLUTION INTRAVENOUS ONCE
Refills: 0 | Status: COMPLETED | OUTPATIENT
Start: 2020-02-15 | End: 2020-02-15

## 2020-02-15 RX ORDER — INSULIN LISPRO 100/ML
VIAL (ML) SUBCUTANEOUS AT BEDTIME
Refills: 0 | Status: DISCONTINUED | OUTPATIENT
Start: 2020-02-15 | End: 2020-02-21

## 2020-02-15 RX ORDER — HYDRALAZINE HCL 50 MG
5 TABLET ORAL ONCE
Refills: 0 | Status: COMPLETED | OUTPATIENT
Start: 2020-02-15 | End: 2020-02-15

## 2020-02-15 RX ORDER — PIPERACILLIN AND TAZOBACTAM 4; .5 G/20ML; G/20ML
2.25 INJECTION, POWDER, LYOPHILIZED, FOR SOLUTION INTRAVENOUS EVERY 8 HOURS
Refills: 0 | Status: DISCONTINUED | OUTPATIENT
Start: 2020-02-15 | End: 2020-02-15

## 2020-02-15 RX ORDER — HYDRALAZINE HCL 50 MG
10 TABLET ORAL EVERY 8 HOURS
Refills: 0 | Status: DISCONTINUED | OUTPATIENT
Start: 2020-02-15 | End: 2020-02-15

## 2020-02-15 RX ORDER — IPRATROPIUM/ALBUTEROL SULFATE 18-103MCG
3 AEROSOL WITH ADAPTER (GRAM) INHALATION EVERY 8 HOURS
Refills: 0 | Status: DISCONTINUED | OUTPATIENT
Start: 2020-02-15 | End: 2020-02-22

## 2020-02-15 RX ORDER — PIPERACILLIN AND TAZOBACTAM 4; .5 G/20ML; G/20ML
2.25 INJECTION, POWDER, LYOPHILIZED, FOR SOLUTION INTRAVENOUS EVERY 8 HOURS
Refills: 0 | Status: DISCONTINUED | OUTPATIENT
Start: 2020-02-16 | End: 2020-02-17

## 2020-02-15 RX ADMIN — CHLORHEXIDINE GLUCONATE 1 APPLICATION(S): 213 SOLUTION TOPICAL at 05:10

## 2020-02-15 RX ADMIN — ATORVASTATIN CALCIUM 10 MILLIGRAM(S): 80 TABLET, FILM COATED ORAL at 22:15

## 2020-02-15 RX ADMIN — Medication 81 MILLIGRAM(S): at 13:38

## 2020-02-15 RX ADMIN — INSULIN HUMAN 3 UNIT(S)/HR: 100 INJECTION, SOLUTION SUBCUTANEOUS at 22:09

## 2020-02-15 RX ADMIN — Medication 0.5 MILLIGRAM(S): at 06:29

## 2020-02-15 RX ADMIN — Medication 2: at 08:47

## 2020-02-15 RX ADMIN — Medication 3 MILLILITER(S): at 06:27

## 2020-02-15 RX ADMIN — Medication 3 MILLILITER(S): at 22:24

## 2020-02-15 RX ADMIN — INSULIN HUMAN 3 UNIT(S)/HR: 100 INJECTION, SOLUTION SUBCUTANEOUS at 10:00

## 2020-02-15 RX ADMIN — POLYETHYLENE GLYCOL 3350 17 GRAM(S): 17 POWDER, FOR SOLUTION ORAL at 13:38

## 2020-02-15 RX ADMIN — Medication 0.5 MILLIGRAM(S): at 17:39

## 2020-02-15 RX ADMIN — PANTOPRAZOLE SODIUM 40 MILLIGRAM(S): 20 TABLET, DELAYED RELEASE ORAL at 05:10

## 2020-02-15 RX ADMIN — Medication 125 MILLILITER(S): at 10:00

## 2020-02-15 RX ADMIN — SPIRONOLACTONE 25 MILLIGRAM(S): 25 TABLET, FILM COATED ORAL at 05:10

## 2020-02-15 RX ADMIN — PHENYLEPHRINE HYDROCHLORIDE 17.62 MICROGRAM(S)/KG/MIN: 10 INJECTION INTRAVENOUS at 10:30

## 2020-02-15 RX ADMIN — MILRINONE LACTATE 2.82 MICROGRAM(S)/KG/MIN: 1 INJECTION, SOLUTION INTRAVENOUS at 13:41

## 2020-02-15 RX ADMIN — Medication 5 MILLIGRAM(S): at 06:15

## 2020-02-15 RX ADMIN — AMLODIPINE BESYLATE 10 MILLIGRAM(S): 2.5 TABLET ORAL at 05:10

## 2020-02-15 RX ADMIN — MILRINONE LACTATE 2.82 MICROGRAM(S)/KG/MIN: 1 INJECTION, SOLUTION INTRAVENOUS at 22:09

## 2020-02-15 RX ADMIN — WARFARIN SODIUM 5 MILLIGRAM(S): 2.5 TABLET ORAL at 18:09

## 2020-02-15 RX ADMIN — Medication 25 MILLIGRAM(S): at 06:29

## 2020-02-15 RX ADMIN — Medication 3 MILLILITER(S): at 14:14

## 2020-02-15 RX ADMIN — PIPERACILLIN AND TAZOBACTAM 200 GRAM(S): 4; .5 INJECTION, POWDER, LYOPHILIZED, FOR SOLUTION INTRAVENOUS at 18:05

## 2020-02-15 NOTE — PROGRESS NOTE ADULT - SUBJECTIVE AND OBJECTIVE BOX
MAGGY LOPEZ  MRN#: [id number]    85F PMHx chronic afib (last coumadin 3 days ago), macular degeneration, HTN, HLD, glaucoma and mitral stenosis with recent symptoms of CASTREJON, now recovering s/p MVR/TVA . Patient remains on a Milrinone infusion.    OBJECTIVE:  ICU Vital Signs Last 24 Hrs  T(C): 37.7 (15 Feb 2020 08:00), Max: 38.2 (2020 16:00)  T(F): 99.9 (15 Feb 2020 08:00), Max: 100.8 (2020 16:00)  HR: 104 (15 Feb 2020 08:00) (81 - 104)  BP: 100/54 (15 Feb 2020 08:00) (100/54 - 100/54)  BP(mean): 75 (15 Feb 2020 08:00) (75 - 75)  ABP: 94/40 (15 Feb 2020 08:00) (94/40 - 151/68)  ABP(mean): 60 (15 Feb 2020 08:00) (60 - 99)  RR: 19 (15 Feb 2020 08:00) (17 - 33)  SpO2: 99% (15 Feb 2020 08:00) (94% - 100%)    I&O's Summary    2020 07:01  -  15 Feb 2020 07:00  --------------------------------------------------------  IN: 881.9 mL / OUT: 3160 mL / NET: -2278.1 mL    15 Feb 2020 07:  -  15 Feb 2020 08:35  --------------------------------------------------------  IN: 2.8 mL / OUT: 0 mL / NET: 2.8 mL    PHYSICAL EXAM:     Daily Weight in k.8 (2020 00:00)  General: WN/WD NAD    HEENT:     + NCAT  + EOMI  - Conjuctival edema   - Icterus   - Thrush   - ETT  - NGT/OGT  Neck:         + FROM    - JVD     - Nodes     - Masses    + Mid-line trachea   - Tracheostomy  Chest:         - Sternal click  - Sternal drainage  + Pacing wires  + Chest tubes  - SubQ emphysema  Lungs:          + CTA   - Rhonchi    - Rales    - Wheezing     - Decreased BS   - Dullness R L  Cardiac:       + S1 + S2    - RRR   + Irregular   - S3  - S4    - Murmurs   - Rub   - Hamman’s sign   Abdomen:    + BS     + Soft    + Non-tender     - Distended    - Organomegaly  - PEG  Extremities:   - Cyanosis U/L   - Clubbing  U/L  - LE/UE Edema   + Capillary refill    + Pulses   Neuro:        + Awake   +  Alert   - Confused   - Lethargic   - Sedated   - Generalized Weakness  Skin:        - Rashes    - Erythema   + Normal incisions   + IV sites intact  - Sacral decubitus    MEDICATIONS  (STANDING):  albuterol/ipratropium for Nebulization 3 milliLiter(s) Nebulizer every 8 hours  amLODIPine   Tablet 10 milliGRAM(s) Oral daily  aspirin enteric coated 81 milliGRAM(s) Oral daily  atorvastatin 10 milliGRAM(s) Oral at bedtime  buDESOnide    Inhalation Suspension 0.5 milliGRAM(s) Inhalation every 12 hours  chlorhexidine 2% Cloths 1 Application(s) Topical <User Schedule>  dextrose 5%. 1000 milliLiter(s) (50 mL/Hr) IV Continuous <Continuous>  dextrose 50% Injectable 12.5 Gram(s) IV Push once  dextrose 50% Injectable 25 Gram(s) IV Push once  dextrose 50% Injectable 25 Gram(s) IV Push once  hydrALAZINE 10 milliGRAM(s) Oral every 8 hours  insulin lispro (HumaLOG) corrective regimen sliding scale   SubCutaneous three times a day before meals  insulin lispro (HumaLOG) corrective regimen sliding scale   SubCutaneous at bedtime  insulin regular Infusion 3 Unit(s)/Hr (3 mL/Hr) IV Continuous <Continuous>  milrinone Infusion 0.2 MICROgram(s)/kG/Min (2.82 mL/Hr) IV Continuous <Continuous>  pantoprazole    Tablet 40 milliGRAM(s) Oral before breakfast  polyethylene glycol 3350 17 Gram(s) Oral daily  sodium chloride 0.9%. 1000 milliLiter(s) (10 mL/Hr) IV Continuous <Continuous>      LABS: All Lab data reviewed and analyzed                        9.2    14.73 )-----------( 87       ( 15 Feb 2020 00:56 )             28.5   02-15    133<L>  |  97  |  17  ----------------------------<  152<H>  4.4   |  25  |  1.43<H>    Ca    8.7      15 Feb 2020 00:56  Phos  3.1     02-15  Mg     2.1     02-15    TPro  5.9<L>  /  Alb  3.2<L>  /  TBili  1.0  /  DBili  x   /  AST  30  /  ALT  18  /  AlkPhos  43  02-15          RADIOLOGY: - Reviewed and analyzed     Recovering s/p MVR/TVA, remains on Milrinone infusion for inotropic support due acute to RV systolic dysfunction and risk of LV dilatation and decompensation post valve replacement with associated increased intraventricular pressure, Norvasc initiated  and Vasotec started this morning for hypertension.   In addition, patient has been started on Aldactone and Torsemide  Respiratory status required supplemental oxygen, close monitoring of breathing pattern and respiratory rate & the following of continuous pulse oximetry for support & to prevent decompensation  Continued mobilization as tolerated  Addressed analgesic regimen to optimize function  ASA continued to prevent thromboembolism s/p MVR in addition to daily coumadin  Lipitor was also continued for long term graft patency  Lovenox continued for VTE prophylaxis in addition to sequential compression devices  Protonix maintained for GI bleeding prophylaxis  Anemia due to acute blood loss, no current indication for transfusion  Stopped ACE/Toradol secondary to OTONIEL-ATN  Reviewed & addressed surgical site infection prophylaxis regimen

## 2020-02-16 LAB
ALBUMIN SERPL ELPH-MCNC: 3.2 G/DL — LOW (ref 3.3–5)
ALP SERPL-CCNC: 41 U/L — SIGNIFICANT CHANGE UP (ref 40–120)
ALT FLD-CCNC: 16 U/L — SIGNIFICANT CHANGE UP (ref 10–45)
ANION GAP SERPL CALC-SCNC: 12 MMOL/L — SIGNIFICANT CHANGE UP (ref 5–17)
APPEARANCE UR: CLEAR — SIGNIFICANT CHANGE UP
AST SERPL-CCNC: 21 U/L — SIGNIFICANT CHANGE UP (ref 10–40)
BACTERIA # UR AUTO: ABNORMAL
BILIRUB SERPL-MCNC: 0.8 MG/DL — SIGNIFICANT CHANGE UP (ref 0.2–1.2)
BILIRUB UR-MCNC: NEGATIVE — SIGNIFICANT CHANGE UP
BUN SERPL-MCNC: 21 MG/DL — SIGNIFICANT CHANGE UP (ref 7–23)
CALCIUM SERPL-MCNC: 9.1 MG/DL — SIGNIFICANT CHANGE UP (ref 8.4–10.5)
CHLORIDE SERPL-SCNC: 103 MMOL/L — SIGNIFICANT CHANGE UP (ref 96–108)
CO2 SERPL-SCNC: 23 MMOL/L — SIGNIFICANT CHANGE UP (ref 22–31)
COLOR SPEC: SIGNIFICANT CHANGE UP
CREAT SERPL-MCNC: 1.32 MG/DL — HIGH (ref 0.5–1.3)
DIFF PNL FLD: ABNORMAL
EPI CELLS # UR: 1 /HPF — SIGNIFICANT CHANGE UP
GAS PNL BLDA: SIGNIFICANT CHANGE UP
GLUCOSE SERPL-MCNC: 122 MG/DL — HIGH (ref 70–99)
GLUCOSE UR QL: NEGATIVE — SIGNIFICANT CHANGE UP
GRAM STN FLD: SIGNIFICANT CHANGE UP
HCT VFR BLD CALC: 28.2 % — LOW (ref 34.5–45)
HGB BLD-MCNC: 9.3 G/DL — LOW (ref 11.5–15.5)
HYALINE CASTS # UR AUTO: 3 /LPF — HIGH (ref 0–2)
INR BLD: 1.47 RATIO — HIGH (ref 0.88–1.16)
INR BLD: 1.83 RATIO — HIGH (ref 0.88–1.16)
KETONES UR-MCNC: NEGATIVE — SIGNIFICANT CHANGE UP
LEUKOCYTE ESTERASE UR-ACNC: ABNORMAL
MAGNESIUM SERPL-MCNC: 2.3 MG/DL — SIGNIFICANT CHANGE UP (ref 1.6–2.6)
MCHC RBC-ENTMCNC: 30.1 PG — SIGNIFICANT CHANGE UP (ref 27–34)
MCHC RBC-ENTMCNC: 33 GM/DL — SIGNIFICANT CHANGE UP (ref 32–36)
MCV RBC AUTO: 91.3 FL — SIGNIFICANT CHANGE UP (ref 80–100)
NITRITE UR-MCNC: NEGATIVE — SIGNIFICANT CHANGE UP
NRBC # BLD: 0 /100 WBCS — SIGNIFICANT CHANGE UP (ref 0–0)
PH UR: 6.5 — SIGNIFICANT CHANGE UP (ref 5–8)
PHOSPHATE SERPL-MCNC: 2.9 MG/DL — SIGNIFICANT CHANGE UP (ref 2.5–4.5)
PLATELET # BLD AUTO: 89 K/UL — LOW (ref 150–400)
POTASSIUM SERPL-MCNC: 4.1 MMOL/L — SIGNIFICANT CHANGE UP (ref 3.5–5.3)
POTASSIUM SERPL-SCNC: 4.1 MMOL/L — SIGNIFICANT CHANGE UP (ref 3.5–5.3)
PROT SERPL-MCNC: 6 G/DL — SIGNIFICANT CHANGE UP (ref 6–8.3)
PROT UR-MCNC: ABNORMAL
PROTHROM AB SERPL-ACNC: 17 SEC — HIGH (ref 10–12.9)
PROTHROM AB SERPL-ACNC: 21.5 SEC — HIGH (ref 10–12.9)
RBC # BLD: 3.09 M/UL — LOW (ref 3.8–5.2)
RBC # FLD: 15.5 % — HIGH (ref 10.3–14.5)
RBC CASTS # UR COMP ASSIST: 26 /HPF — HIGH (ref 0–4)
SODIUM SERPL-SCNC: 138 MMOL/L — SIGNIFICANT CHANGE UP (ref 135–145)
SP GR SPEC: 1.02 — SIGNIFICANT CHANGE UP (ref 1.01–1.02)
SPECIMEN SOURCE: SIGNIFICANT CHANGE UP
UROBILINOGEN FLD QL: NEGATIVE — SIGNIFICANT CHANGE UP
WBC # BLD: 11.2 K/UL — HIGH (ref 3.8–10.5)
WBC # FLD AUTO: 11.2 K/UL — HIGH (ref 3.8–10.5)
WBC UR QL: 4 /HPF — SIGNIFICANT CHANGE UP (ref 0–5)

## 2020-02-16 PROCEDURE — 71045 X-RAY EXAM CHEST 1 VIEW: CPT | Mod: 26

## 2020-02-16 PROCEDURE — 93306 TTE W/DOPPLER COMPLETE: CPT | Mod: 26

## 2020-02-16 PROCEDURE — 99291 CRITICAL CARE FIRST HOUR: CPT

## 2020-02-16 RX ORDER — FUROSEMIDE 40 MG
20 TABLET ORAL ONCE
Refills: 0 | Status: DISCONTINUED | OUTPATIENT
Start: 2020-02-16 | End: 2020-02-16

## 2020-02-16 RX ORDER — HYDRALAZINE HCL 50 MG
5 TABLET ORAL ONCE
Refills: 0 | Status: COMPLETED | OUTPATIENT
Start: 2020-02-16 | End: 2020-02-16

## 2020-02-16 RX ORDER — WARFARIN SODIUM 2.5 MG/1
2.5 TABLET ORAL ONCE
Refills: 0 | Status: COMPLETED | OUTPATIENT
Start: 2020-02-16 | End: 2020-02-16

## 2020-02-16 RX ORDER — DIGOXIN 250 MCG
0.12 TABLET ORAL DAILY
Refills: 0 | Status: DISCONTINUED | OUTPATIENT
Start: 2020-02-16 | End: 2020-02-17

## 2020-02-16 RX ADMIN — PIPERACILLIN AND TAZOBACTAM 200 GRAM(S): 4; .5 INJECTION, POWDER, LYOPHILIZED, FOR SOLUTION INTRAVENOUS at 03:14

## 2020-02-16 RX ADMIN — CHLORHEXIDINE GLUCONATE 1 APPLICATION(S): 213 SOLUTION TOPICAL at 20:00

## 2020-02-16 RX ADMIN — Medication 3 MILLILITER(S): at 06:18

## 2020-02-16 RX ADMIN — AMLODIPINE BESYLATE 10 MILLIGRAM(S): 2.5 TABLET ORAL at 06:45

## 2020-02-16 RX ADMIN — Medication 0.5 MILLIGRAM(S): at 06:19

## 2020-02-16 RX ADMIN — Medication 2: at 17:35

## 2020-02-16 RX ADMIN — Medication 0.12 MILLIGRAM(S): at 20:39

## 2020-02-16 RX ADMIN — WARFARIN SODIUM 2.5 MILLIGRAM(S): 2.5 TABLET ORAL at 17:22

## 2020-02-16 RX ADMIN — PIPERACILLIN AND TAZOBACTAM 200 GRAM(S): 4; .5 INJECTION, POWDER, LYOPHILIZED, FOR SOLUTION INTRAVENOUS at 10:21

## 2020-02-16 RX ADMIN — Medication 81 MILLIGRAM(S): at 12:12

## 2020-02-16 RX ADMIN — PIPERACILLIN AND TAZOBACTAM 200 GRAM(S): 4; .5 INJECTION, POWDER, LYOPHILIZED, FOR SOLUTION INTRAVENOUS at 17:22

## 2020-02-16 RX ADMIN — ATORVASTATIN CALCIUM 10 MILLIGRAM(S): 80 TABLET, FILM COATED ORAL at 20:41

## 2020-02-16 RX ADMIN — Medication 0.5 MILLIGRAM(S): at 17:21

## 2020-02-16 RX ADMIN — CHLORHEXIDINE GLUCONATE 1 APPLICATION(S): 213 SOLUTION TOPICAL at 06:45

## 2020-02-16 RX ADMIN — Medication 5 MILLIGRAM(S): at 07:51

## 2020-02-16 RX ADMIN — POLYETHYLENE GLYCOL 3350 17 GRAM(S): 17 POWDER, FOR SOLUTION ORAL at 12:12

## 2020-02-16 RX ADMIN — PANTOPRAZOLE SODIUM 40 MILLIGRAM(S): 20 TABLET, DELAYED RELEASE ORAL at 06:45

## 2020-02-16 RX ADMIN — Medication 5: at 12:12

## 2020-02-16 RX ADMIN — Medication 3 MILLILITER(S): at 13:56

## 2020-02-16 NOTE — PROGRESS NOTE ADULT - SUBJECTIVE AND OBJECTIVE BOX
CRITICAL CARE ATTENDING - CTICU    MEDICATIONS  (STANDING):  albuterol/ipratropium for Nebulization 3 milliLiter(s) Nebulizer every 8 hours  amLODIPine   Tablet 10 milliGRAM(s) Oral daily  aspirin enteric coated 81 milliGRAM(s) Oral daily  atorvastatin 10 milliGRAM(s) Oral at bedtime  buDESOnide    Inhalation Suspension 0.5 milliGRAM(s) Inhalation every 12 hours  chlorhexidine 2% Cloths 1 Application(s) Topical <User Schedule>  dextrose 5%. 1000 milliLiter(s) (50 mL/Hr) IV Continuous <Continuous>  dextrose 50% Injectable 12.5 Gram(s) IV Push once  dextrose 50% Injectable 25 Gram(s) IV Push once  dextrose 50% Injectable 25 Gram(s) IV Push once  insulin lispro (HumaLOG) corrective regimen sliding scale   SubCutaneous three times a day before meals  insulin lispro (HumaLOG) corrective regimen sliding scale   SubCutaneous at bedtime  milrinone Infusion 0.125 MICROgram(s)/kG/Min (1.762 mL/Hr) IV Continuous <Continuous>  pantoprazole    Tablet 40 milliGRAM(s) Oral before breakfast  piperacillin/tazobactam IVPB.. 2.25 Gram(s) IV Intermittent every 8 hours  polyethylene glycol 3350 17 Gram(s) Oral daily  sodium chloride 0.9%. 1000 milliLiter(s) (10 mL/Hr) IV Continuous <Continuous>                                    9.3    11.20 )-----------( 89       ( 2020 01:39 )             28.2       02-16    138  |  103  |  21  ----------------------------<  122<H>  4.1   |  23  |  1.32<H>    Ca    9.1      2020 01:39  Phos  2.9     02-16  Mg     2.3     02-16    TPro  6.0  /  Alb  3.2<L>  /  TBili  0.8  /  DBili  x   /  AST  21  /  ALT  16  /  AlkPhos  41  02-16      PT/INR - ( 2020 01:39 )   PT: 17.0 sec;   INR: 1.47 ratio         PTT - ( 15 Feb 2020 00:56 )  PTT:27.4 sec        Daily     Daily Weight in k.1 (2020 00:00)      02-15 @ 07: @ 07:00  --------------------------------------------------------  IN: 924.3 mL / OUT: 1510 mL / NET: -585.7 mL     @ 07:01   @ 12:04  --------------------------------------------------------  IN: 290 mL / OUT: 270 mL / NET: 20 mL        Critically Ill patient  : [ ] preoperative ,   [ x] post operative    Requires :  [ x] Arterial Line   [ x] Central Line  [ ] PA catheter  [ ] IABP  [ ] ECMO  [ ] LVAD  [ ] Ventilator  [ x] pacemaker [ ] Impella.                      [ x] ABG's     [ x] Pulse Oxymetry Monitoring  Bedside evaluation , monitoring , treatment of hemodynamics , fluids , IVP/ IVCD meds.        Diagnosis:     POD 2/12 - MVR / TV repair / LAAC     Hypertension    CHF- acute [x ]   chronic [x ]    systolic [ ]   diatolic [ ]          - Echo- EF -             [x ] RV dysfunction          - Cxr-cardiomegally, edema          - Clinical-  [x ]inotropes   [ ]pressors   [x ]diuresis   [ ]IABP   [ ]ECMO   [ ]LVAD   [ ]Respiratory Failure    Hemodynamic lability,  instability. Requires IVCD [ ] vasopressors [ x] inotropes  [ ] vasodilator  [ ]IVSS fluid  to maintain MAP, perfusion, C.I.     Thrombocytopenia     Renal Failure - Acute Kidney Injury     Temporary pacemaker (TPM) interrogation and setting.     LLLB opacification on cxr ? pneumonia ?    TTE     Anticoagulated with coumadin for A Fib / MVR                     -                     Discussed with CT surgeon, Physician's Assistant - Nurse Practitioner- Critical care medicine team.   Dicussed at  AM / PM rounds.   Chart, labs , films reviewed.    Total Time: 30 min

## 2020-02-17 DIAGNOSIS — Z95.2 PRESENCE OF PROSTHETIC HEART VALVE: ICD-10-CM

## 2020-02-17 DIAGNOSIS — E16.2 HYPOGLYCEMIA, UNSPECIFIED: ICD-10-CM

## 2020-02-17 DIAGNOSIS — Z98.890 OTHER SPECIFIED POSTPROCEDURAL STATES: ICD-10-CM

## 2020-02-17 DIAGNOSIS — I10 ESSENTIAL (PRIMARY) HYPERTENSION: ICD-10-CM

## 2020-02-17 LAB
ALBUMIN SERPL ELPH-MCNC: 3.1 G/DL — LOW (ref 3.3–5)
ALP SERPL-CCNC: 42 U/L — SIGNIFICANT CHANGE UP (ref 40–120)
ALT FLD-CCNC: 16 U/L — SIGNIFICANT CHANGE UP (ref 10–45)
ANION GAP SERPL CALC-SCNC: 8 MMOL/L — SIGNIFICANT CHANGE UP (ref 5–17)
AST SERPL-CCNC: 18 U/L — SIGNIFICANT CHANGE UP (ref 10–40)
BILIRUB SERPL-MCNC: 0.7 MG/DL — SIGNIFICANT CHANGE UP (ref 0.2–1.2)
BUN SERPL-MCNC: 19 MG/DL — SIGNIFICANT CHANGE UP (ref 7–23)
CALCIUM SERPL-MCNC: 9 MG/DL — SIGNIFICANT CHANGE UP (ref 8.4–10.5)
CHLORIDE SERPL-SCNC: 103 MMOL/L — SIGNIFICANT CHANGE UP (ref 96–108)
CO2 SERPL-SCNC: 25 MMOL/L — SIGNIFICANT CHANGE UP (ref 22–31)
CREAT SERPL-MCNC: 1.08 MG/DL — SIGNIFICANT CHANGE UP (ref 0.5–1.3)
GLUCOSE SERPL-MCNC: 123 MG/DL — HIGH (ref 70–99)
HCT VFR BLD CALC: 29.1 % — LOW (ref 34.5–45)
HGB BLD-MCNC: 9.2 G/DL — LOW (ref 11.5–15.5)
INR BLD: 2.09 RATIO — HIGH (ref 0.88–1.16)
MAGNESIUM SERPL-MCNC: 2.3 MG/DL — SIGNIFICANT CHANGE UP (ref 1.6–2.6)
MCHC RBC-ENTMCNC: 29.5 PG — SIGNIFICANT CHANGE UP (ref 27–34)
MCHC RBC-ENTMCNC: 31.6 GM/DL — LOW (ref 32–36)
MCV RBC AUTO: 93.3 FL — SIGNIFICANT CHANGE UP (ref 80–100)
NRBC # BLD: 0 /100 WBCS — SIGNIFICANT CHANGE UP (ref 0–0)
PHOSPHATE SERPL-MCNC: 2.9 MG/DL — SIGNIFICANT CHANGE UP (ref 2.5–4.5)
PLATELET # BLD AUTO: 98 K/UL — LOW (ref 150–400)
POTASSIUM SERPL-MCNC: 4.6 MMOL/L — SIGNIFICANT CHANGE UP (ref 3.5–5.3)
POTASSIUM SERPL-SCNC: 4.6 MMOL/L — SIGNIFICANT CHANGE UP (ref 3.5–5.3)
PROT SERPL-MCNC: 6.1 G/DL — SIGNIFICANT CHANGE UP (ref 6–8.3)
PROTHROM AB SERPL-ACNC: 24.4 SEC — HIGH (ref 10–12.9)
RBC # BLD: 3.12 M/UL — LOW (ref 3.8–5.2)
RBC # FLD: 15.1 % — HIGH (ref 10.3–14.5)
SODIUM SERPL-SCNC: 136 MMOL/L — SIGNIFICANT CHANGE UP (ref 135–145)
WBC # BLD: 8.2 K/UL — SIGNIFICANT CHANGE UP (ref 3.8–10.5)
WBC # FLD AUTO: 8.2 K/UL — SIGNIFICANT CHANGE UP (ref 3.8–10.5)

## 2020-02-17 PROCEDURE — 99233 SBSQ HOSP IP/OBS HIGH 50: CPT

## 2020-02-17 PROCEDURE — 71045 X-RAY EXAM CHEST 1 VIEW: CPT | Mod: 26

## 2020-02-17 RX ORDER — INSULIN LISPRO 100/ML
VIAL (ML) SUBCUTANEOUS
Refills: 0 | Status: DISCONTINUED | OUTPATIENT
Start: 2020-02-17 | End: 2020-02-21

## 2020-02-17 RX ORDER — WARFARIN SODIUM 2.5 MG/1
1.5 TABLET ORAL ONCE
Refills: 0 | Status: COMPLETED | OUTPATIENT
Start: 2020-02-17 | End: 2020-02-17

## 2020-02-17 RX ORDER — FUROSEMIDE 40 MG
40 TABLET ORAL DAILY
Refills: 0 | Status: DISCONTINUED | OUTPATIENT
Start: 2020-02-17 | End: 2020-02-21

## 2020-02-17 RX ORDER — PIPERACILLIN AND TAZOBACTAM 4; .5 G/20ML; G/20ML
3.38 INJECTION, POWDER, LYOPHILIZED, FOR SOLUTION INTRAVENOUS EVERY 8 HOURS
Refills: 0 | Status: COMPLETED | OUTPATIENT
Start: 2020-02-17 | End: 2020-02-20

## 2020-02-17 RX ORDER — WARFARIN SODIUM 2.5 MG/1
2.5 TABLET ORAL ONCE
Refills: 0 | Status: DISCONTINUED | OUTPATIENT
Start: 2020-02-17 | End: 2020-02-17

## 2020-02-17 RX ORDER — ENOXAPARIN SODIUM 100 MG/ML
30 INJECTION SUBCUTANEOUS DAILY
Refills: 0 | Status: DISCONTINUED | OUTPATIENT
Start: 2020-02-17 | End: 2020-02-18

## 2020-02-17 RX ORDER — METOPROLOL TARTRATE 50 MG
25 TABLET ORAL EVERY 12 HOURS
Refills: 0 | Status: DISCONTINUED | OUTPATIENT
Start: 2020-02-17 | End: 2020-02-18

## 2020-02-17 RX ORDER — WARFARIN SODIUM 2.5 MG/1
2 TABLET ORAL ONCE
Refills: 0 | Status: DISCONTINUED | OUTPATIENT
Start: 2020-02-17 | End: 2020-02-17

## 2020-02-17 RX ORDER — METOPROLOL TARTRATE 50 MG
2.5 TABLET ORAL ONCE
Refills: 0 | Status: COMPLETED | OUTPATIENT
Start: 2020-02-17 | End: 2020-02-17

## 2020-02-17 RX ADMIN — ENOXAPARIN SODIUM 30 MILLIGRAM(S): 100 INJECTION SUBCUTANEOUS at 14:18

## 2020-02-17 RX ADMIN — AMLODIPINE BESYLATE 10 MILLIGRAM(S): 2.5 TABLET ORAL at 05:23

## 2020-02-17 RX ADMIN — PIPERACILLIN AND TAZOBACTAM 200 GRAM(S): 4; .5 INJECTION, POWDER, LYOPHILIZED, FOR SOLUTION INTRAVENOUS at 01:21

## 2020-02-17 RX ADMIN — Medication 81 MILLIGRAM(S): at 14:18

## 2020-02-17 RX ADMIN — Medication 25 MILLIGRAM(S): at 17:11

## 2020-02-17 RX ADMIN — PIPERACILLIN AND TAZOBACTAM 25 GRAM(S): 4; .5 INJECTION, POWDER, LYOPHILIZED, FOR SOLUTION INTRAVENOUS at 22:57

## 2020-02-17 RX ADMIN — Medication 25 MILLIGRAM(S): at 04:48

## 2020-02-17 RX ADMIN — Medication 3 MILLILITER(S): at 06:04

## 2020-02-17 RX ADMIN — Medication 3 MILLILITER(S): at 14:23

## 2020-02-17 RX ADMIN — PIPERACILLIN AND TAZOBACTAM 25 GRAM(S): 4; .5 INJECTION, POWDER, LYOPHILIZED, FOR SOLUTION INTRAVENOUS at 22:02

## 2020-02-17 RX ADMIN — Medication 0.5 MILLIGRAM(S): at 17:11

## 2020-02-17 RX ADMIN — Medication 10: at 07:30

## 2020-02-17 RX ADMIN — Medication 2.5 MILLIGRAM(S): at 04:46

## 2020-02-17 RX ADMIN — PANTOPRAZOLE SODIUM 40 MILLIGRAM(S): 20 TABLET, DELAYED RELEASE ORAL at 05:23

## 2020-02-17 RX ADMIN — Medication 1: at 17:11

## 2020-02-17 RX ADMIN — Medication 0.5 MILLIGRAM(S): at 06:04

## 2020-02-17 RX ADMIN — Medication 3 MILLILITER(S): at 22:01

## 2020-02-17 RX ADMIN — PIPERACILLIN AND TAZOBACTAM 25 GRAM(S): 4; .5 INJECTION, POWDER, LYOPHILIZED, FOR SOLUTION INTRAVENOUS at 14:18

## 2020-02-17 RX ADMIN — WARFARIN SODIUM 1.5 MILLIGRAM(S): 2.5 TABLET ORAL at 22:02

## 2020-02-17 RX ADMIN — Medication 40 MILLIGRAM(S): at 05:23

## 2020-02-17 NOTE — CONSULT NOTE ADULT - ASSESSMENT
Assessment  Hypoglycemia: 85y Female with pre-diabetes s/p cardiac surgery, on insulin coverage moderate scale with hypoglycemia, A1C 6.4%, she is eating meals, blood sugars improved.   MVR: S/P surgery, on medications, no chest pain, stable, monitored.  HTN: Controlled,  on antihypertensive medications.    Overweight/Obesity: No strict exercise routines, not on any weight loss program, neither on low calorie diet.          Ginna Jean MD  Cell: [phone]  Office: [phone]

## 2020-02-17 NOTE — PROGRESS NOTE ADULT - ASSESSMENT
85F PMHx chronic afib ( last coumadin 3 days ago ), macular degeneration, HTN, HLD, glaucoma and mitral stenosis.  Patient presents with CASTREJON on exertion  TTE severe mitral .  Cardiac cath reviewed by Dr Hall.   OR am for MVR  s/p  2/12 ; Mitral valve replaced with a #31 Irene Mitral Valve; Tricuspid valve repair with #34 annuloplasty ring; open excision of left atrial appendage and closure via primary repair	  ef 55%  Slow afib , paced.  + primacor, Dobutamine  + pressors  Extubated d 1  2/14 remains on Milrinone infusion for inotropic support due acute to RV systolic dysfunction .  Norvasc initiated 2/13 and Vasotec started this morning for hypertension.   In addition, patient has been started on Aldactone and Torsemide.  Hypotension- sommer- norvasc and vasotec d/c  TTE no effusion: < from: Transthoracic Echocardiogram (02.16.20 @ 12:20) >  1. Bioprosthetic mitral valve replacement. Minimal mitral  regurgitation. Mean transmitral valve gradient equals 6 mm  Hg, which is elevated in the presence of a prosthetic  valve.  2. Calcified trileaflet aortic valve with normal opening.  Minimal aortic regurgitation.  3. Severely dilated left atrium.  LA volume index = 106  cc/m2.  4. Eccentric left ventricular hypertrophy (dilated left  ventricle with normal relative wall thickness).  5. Normal left ventricular systolic function. No segmental  wall motion abnormalities. Septal motion consistent with  cardiac surgery.  6. Normal right ventricular size and function.  7. An annuloplasty ring is seen in the tricuspid position.  Mild tricuspid regurgitation.  8. Estimated pulmonary artery systolic pressure equals 57  mm Hg, assuming right atrial pressure equals 8 mm Hg,  consistent with moderate pulmonary pressures.  9. Left pleural effusion.    < end of copied text >    Primacor d/c this am  LLL opacity - on Zosyn, sono minimal fluid  Beta blocker started, maximize prior to adding additional agents.  Diuretics added.  Hgb a1c 6.4 preop , was on insulin infusion until yestrerday.  She had a glucose of 247 this am, was trreatred according to scale and transferred to sdu.  She had c/o dizziness and diaphoresis.  BS 46, 1/2 amp d50 and apple juice given.  Scale changed to low coverage and endocrine consulted.    . 85F PMHx chronic afib ( last coumadin 3 days ago ), macular degeneration, HTN, HLD, glaucoma and mitral stenosis.  Patient presents with CASTREJON on exertion  TTE severe mitral .  Cardiac cath reviewed by Dr Hall.   OR am for MVR  s/p  2/12 ; Mitral valve replaced with a #31 Irene Mitral Valve; Tricuspid valve repair with #34 annuloplasty ring; open excision of left atrial appendage and closure via primary repair	  ef 55%  Slow afib , paced.  + primacor, Dobutamine  + pressors  Extubated d 1  2/14 remains on Milrinone infusion for inotropic support due acute to RV systolic dysfunction .  Norvasc initiated 2/13 and Vasotec started this morning for hypertension.   In addition, patient has been started on Aldactone and Torsemide.  Hypotension- sommer- norvasc and vasotec d/c  TTE no effusion: < from: Transthoracic Echocardiogram (02.16.20 @ 12:20) >  1. Bioprosthetic mitral valve replacement. Minimal mitral  regurgitation. Mean transmitral valve gradient equals 6 mm  Hg, which is elevated in the presence of a prosthetic  valve.  2. Calcified trileaflet aortic valve with normal opening.  Minimal aortic regurgitation.  3. Severely dilated left atrium.  LA volume index = 106  cc/m2.  4. Eccentric left ventricular hypertrophy (dilated left  ventricle with normal relative wall thickness).  5. Normal left ventricular systolic function. No segmental  wall motion abnormalities. Septal motion consistent with  cardiac surgery.  6. Normal right ventricular size and function.  7. An annuloplasty ring is seen in the tricuspid position.  Mild tricuspid regurgitation.  8. Estimated pulmonary artery systolic pressure equals 57  mm Hg, assuming right atrial pressure equals 8 mm Hg,  consistent with moderate pulmonary pressures.  9. Left pleural effusion.    < end of copied text >    Primacor d/c this am  LLL opacity - on Zosyn, sono minimal fluid  Beta blocker started, maximize prior to adding additional agents.  Diuretics added.  Hgb a1c 6.4 preop , was on insulin infusion until yestrerday.  She had a glucose of 247 this am, was trreated according to scale and transferred to sdu.  She had c/o dizziness and diaphoresis.  BS 46, 1/2 amp d50 and apple juice given.  Scale changed to low coverage and endocrine consulted.    .

## 2020-02-17 NOTE — PROGRESS NOTE ADULT - SUBJECTIVE AND OBJECTIVE BOX
VITAL SIGNS    Telemetry:    Vital Signs Last 24 Hrs  T(C): 36.8 (20 @ 11:30), Max: 38 (20 @ 20:00)  T(F): 98.2 (20 @ 11:30), Max: 100.4 (20 @ 20:00)  HR: 91 (20:30) (71 - 98)  BP: 119/56 (20 11:30) (114/90 - 156/70)  RR: 20 (20 11:30) (18 - 36)  SpO2: 99% (20 @ 11:30) (96% - 100%)                       9.2<L>                136  | 25   | 19           8.20  >-----------< 98<L>   ------------------------< 123<H>                 29.1<L>                4.6  | 103  | 1.08                                                                      Ca 9.0   Mg 2.3   Ph 2.9      ,             9.3<L>                138  | 23   | 21           11.20<H> >-----------< 89<L>   ------------------------< 122<H>                 28.2<L>                4.1  | 103  | 1.32<H>                                                                     Ca 9.1   Mg 2.3   Ph 2.9              20 @ 07:01  -  20 @ 07:00  --------------------------------------------------------  IN: 1210 mL / OUT: 2305 mL / NET: -1095 mL    20 @ 07:01  -  20 @ 13:04  --------------------------------------------------------  IN: 0 mL / OUT: 650 mL / NET: -650 mL     @ 15:23  PT21.5 INR1.83  PTT--   @ 01:39  PT17.0 INR1.47  PTT--  02-15 @ 13:56  PT16.7 INR1.45  PTT--  02-15 @ 00:56  PT14.6 INR1.26  PTT27.4  14 @ 16:27  PT15.2 INR1.31  PTT--   @ 01:05  PT17.8 INR1.54  PTT32.6  0213 @ 00:21  PT14.6 INR1.26  PTT27.2  0212 @ 14:34  PT14.3 INR1.24  PTT26.3      Daily     Daily Weight in k.5 (2020 00:00)        CAPILLARY BLOOD GLUCOSE  135 (2020 17:00)      POCT Blood Glucose.: 129 mg/dL (2020 13:00)  POCT Blood Glucose.: 117 mg/dL (2020 12:21)  POCT Blood Glucose.: 45 mg/dL (2020 12:11)  POCT Blood Glucose.: 132 mg/dL (2020 10:20)  POCT Blood Glucose.: 247 mg/dL (2020 09:26)  POCT Blood Glucose.: 151 mg/dL (2020 20:47)  POCT Blood Glucose.: 138 mg/dL (2020 17:17)                Coumadin    [ ] YES          [  ]      NO                                   PHYSICAL EXAM        Neurology: alert and oriented x 3, nonfocal, no gross deficits  CV : .S1S2 RRR  Sternal Wound :  CDI , Stable  Pacing Wires        [  ]   Settings:                                  Isolated  [  ]  Lungs: bibasilar crackles   Drains:     MS         [  ] Drainage:                 L Pleural  [  ]  Drainage:                R Pleural  [  ]  Drainage:  Abdomen: soft, nontender, nondistended, positive bowel sounds, last bowel movement   :         voiding    Extremities:     __ edema, ___calf tenderness.                            __svg site cdi          albuterol/ipratropium for Nebulization 3 milliLiter(s) Nebulizer every 8 hours  aspirin enteric coated 81 milliGRAM(s) Oral daily  buDESOnide    Inhalation Suspension 0.5 milliGRAM(s) Inhalation every 12 hours  dextrose 40% Gel 15 Gram(s) Oral once PRN  dextrose 5%. 1000 milliLiter(s) IV Continuous <Continuous>  dextrose 50% Injectable 12.5 Gram(s) IV Push once  dextrose 50% Injectable 25 Gram(s) IV Push once  dextrose 50% Injectable 25 Gram(s) IV Push once  enoxaparin Injectable 30 milliGRAM(s) SubCutaneous daily  furosemide    Tablet 40 milliGRAM(s) Oral daily  insulin lispro (HumaLOG) corrective regimen sliding scale   SubCutaneous three times a day before meals  insulin lispro (HumaLOG) corrective regimen sliding scale   SubCutaneous at bedtime  metoprolol tartrate 25 milliGRAM(s) Oral every 12 hours  oxycodone    5 mG/acetaminophen 325 mG 1 Tablet(s) Oral every 6 hours PRN  pantoprazole    Tablet 40 milliGRAM(s) Oral before breakfast  piperacillin/tazobactam IVPB.. 3.375 Gram(s) IV Intermittent every 8 hours  polyethylene glycol 3350 17 Gram(s) Oral daily                    Physical Therapy Rec:   Home  [  ]   Home w/ PT  [  ]  Rehab  [  ]  Discussed with Cardiothoracic Team at AM rounds. im ok    VITAL SIGNS    Telemetry:   a fib 80  Vital Signs Last 24 Hrs  T(C): 36.8 (20 @ 11:30), Max: 38 (20 @ 20:00)  T(F): 98.2 (20 @ 11:30), Max: 100.4 (20 @ 20:00)  HR: 91 (20 @ 11:30) (71 - 98)  BP: 119/56 (20 @ 11:30) (114/90 - 156/70)  RR: 20 (20 @ 11:30) (18 - 36)  SpO2: 99% (20 @ 11:30) (96% - 100%)                       9.2<L>                136  | 25   | 19           8.20  >-----------< 98<L>   ------------------------< 123<H>                 29.1<L>                4.6  | 103  | 1.08                                                                      Ca 9.0   Mg 2.3   Ph 2.9      ,             9.3<L>                138  | 23   | 21           11.20<H> >-----------< 89<L>   ------------------------< 122<H>                 28.2<L>                4.1  | 103  | 1.32<H>                                                                     Ca 9.1   Mg 2.3   Ph 2.9              20 @ 07:01  -  20 @ 07:00  --------------------------------------------------------  IN: 1210 mL / OUT: 2305 mL / NET: -1095 mL    20 @ 07:01  -  20 @ 13:04  --------------------------------------------------------  IN: 0 mL / OUT: 650 mL / NET: -650 mL     @ 15:23  PT21.5 INR1.83  PTT--   @ 01:39  PT17.0 INR1.47  PTT--  02-15 @ 13:56  PT16.7 INR1.45  PTT--  02-15 @ 00:56  PT14.6 INR1.26  PTT27.4  0214 @ 16:27  PT15.2 INR1.31  PTT--   @ 01:05  PT17.8 INR1.54  PTT32.6  02-13 @ 00:21  PT14.6 INR1.26  PTT27.2  02-12 @ 14:34  PT14.3 INR1.24  PTT26.3      Daily     Daily Weight in k.5 (2020 00:00)        CAPILLARY BLOOD GLUCOSE  135 (2020 17:00)      POCT Blood Glucose.: 129 mg/dL (2020 13:00)  POCT Blood Glucose.: 117 mg/dL (2020 12:21)  POCT Blood Glucose.: 45 mg/dL (2020 12:11)  POCT Blood Glucose.: 132 mg/dL (2020 10:20)  POCT Blood Glucose.: 247 mg/dL (2020 09:26)  POCT Blood Glucose.: 151 mg/dL (2020 20:47)  POCT Blood Glucose.: 138 mg/dL (2020 17:17)                Coumadin    [ ] YES          [x]      NO                                   PHYSICAL EXAM        Neurology: alert and oriented x 3, nonfocal, no gross deficits  CV : .S1S2 RRR  Sternal Wound :  CDI , Stable  Pacing Wires        [  x]   Settings:           vvi                       Isolated  [  ]  Lungs: bibasilar crackles   Abdomen: soft, nontender, nondistended, positive bowel sounds  :         voiding    Extremities:     - edema, __-_calf tenderness.            albuterol/ipratropium for Nebulization 3 milliLiter(s) Nebulizer every 8 hours  aspirin enteric coated 81 milliGRAM(s) Oral daily  buDESOnide    Inhalation Suspension 0.5 milliGRAM(s) Inhalation every 12 hours  dextrose 40% Gel 15 Gram(s) Oral once PRN  dextrose 5%. 1000 milliLiter(s) IV Continuous <Continuous>  dextrose 50% Injectable 12.5 Gram(s) IV Push once  dextrose 50% Injectable 25 Gram(s) IV Push once  dextrose 50% Injectable 25 Gram(s) IV Push once  enoxaparin Injectable 30 milliGRAM(s) SubCutaneous daily  furosemide    Tablet 40 milliGRAM(s) Oral daily  insulin lispro (HumaLOG) corrective regimen sliding scale   SubCutaneous three times a day before meals  insulin lispro (HumaLOG) corrective regimen sliding scale   SubCutaneous at bedtime  metoprolol tartrate 25 milliGRAM(s) Oral every 12 hours  oxycodone    5 mG/acetaminophen 325 mG 1 Tablet(s) Oral every 6 hours PRN  pantoprazole    Tablet 40 milliGRAM(s) Oral before breakfast  piperacillin/tazobactam IVPB.. 3.375 Gram(s) IV Intermittent every 8 hours  polyethylene glycol 3350 17 Gram(s) Oral daily                    Physical Therapy Rec:   Home  [  ]   Home w/ PT  [  ]  Rehab  [  ]  Discussed with Cardiothoracic Team at AM rounds.

## 2020-02-17 NOTE — CONSULT NOTE ADULT - SUBJECTIVE AND OBJECTIVE BOX
HPI:  85F PMHx chronic afib ( last coumadin 3 days ago ), macular degeneration, HTN, HLD, glaucoma and mitral stenosis.  Patient presents with CASTREJON on exertion  TTE severe mitral .  Cardiac cath reviewed by Dr Hall.   OR am for MVR (11 Feb 2020 13:46)  Patient has no history of diabetes, no polyuria polydipsia. Patient follows up with PCP for health management.    PAST MEDICAL & SURGICAL HISTORY:  Macular degeneration  H/O impetigo  History of chronic atrial fibrillation  Mitral stenosis  S/P laser iridotomy  History of hysterectomy  History of appendectomy      FAMILY HISTORY:      Social History:    Outpatient Medications:    MEDICATIONS  (STANDING):  albuterol/ipratropium for Nebulization 3 milliLiter(s) Nebulizer every 8 hours  aspirin enteric coated 81 milliGRAM(s) Oral daily  buDESOnide    Inhalation Suspension 0.5 milliGRAM(s) Inhalation every 12 hours  dextrose 5%. 1000 milliLiter(s) (50 mL/Hr) IV Continuous <Continuous>  dextrose 50% Injectable 12.5 Gram(s) IV Push once  dextrose 50% Injectable 25 Gram(s) IV Push once  dextrose 50% Injectable 25 Gram(s) IV Push once  enoxaparin Injectable 30 milliGRAM(s) SubCutaneous daily  furosemide    Tablet 40 milliGRAM(s) Oral daily  insulin lispro (HumaLOG) corrective regimen sliding scale   SubCutaneous three times a day before meals  insulin lispro (HumaLOG) corrective regimen sliding scale   SubCutaneous at bedtime  metoprolol tartrate 25 milliGRAM(s) Oral every 12 hours  pantoprazole    Tablet 40 milliGRAM(s) Oral before breakfast  piperacillin/tazobactam IVPB.. 3.375 Gram(s) IV Intermittent every 8 hours  polyethylene glycol 3350 17 Gram(s) Oral daily  warfarin 1.5 milliGRAM(s) Oral once    MEDICATIONS  (PRN):  dextrose 40% Gel 15 Gram(s) Oral once PRN Blood Glucose LESS THAN 70 milliGRAM(s)/deciliter  oxycodone    5 mG/acetaminophen 325 mG 1 Tablet(s) Oral every 6 hours PRN Severe Pain (7 - 10)      Allergies    No Known Allergies    Intolerances      Review of Systems:  Constitutional: No fever, no chills  Eyes: No blurry vision  Neuro: No tremors  HEENT: No pain, no neck swelling  Cardiovascular: No chest pain, no palpitations  Respiratory: No SOB, no cough  GI: No nausea, vomiting, abdominal pain  : No dysuria  Skin: no rash  MSK: Has leg swelling.  Psych: no depression  Endocrine: no polyuria, polydipsia    UNABLE TO OBTAIN    ALL OTHER SYSTEMS REVIEWED AND NEGATIVE        PHYSICAL EXAM:  VITALS: T(C): 36.7 (02-17-20 @ 15:00)  T(F): 98.1 (02-17-20 @ 15:00), Max: 100.4 (02-16-20 @ 20:00)  HR: 86 (02-17-20 @ 15:00) (71 - 96)  BP: 119/58 (02-17-20 @ 15:00) (114/90 - 156/70)  RR:  (18 - 36)  SpO2:  (96% - 100%)  Wt(kg): --  GENERAL: NAD, well-groomed, well-developed  EYES: No proptosis, no lid lag  HEENT:  Atraumatic, Normocephalic  THYROID: Normal size, no palpable nodules  RESPIRATORY: Clear to auscultation bilaterally; No rales, rhonchi, wheezing  CARDIOVASCULAR: Si S2, No murmurs;  GI: Soft, non distended, normal bowel sounds  SKIN: Dry, intact, No rashes or lesions  MUSCULOSKELETAL: Has BL lower extremity edema.  NEURO:  no tremor, sensation decreased in feet BL,    POCT Blood Glucose.: 157 mg/dL (02-17-20 @ 17:04)  POCT Blood Glucose.: 147 mg/dL (02-17-20 @ 13:21)  POCT Blood Glucose.: 129 mg/dL (02-17-20 @ 13:00)  POCT Blood Glucose.: 117 mg/dL (02-17-20 @ 12:21)  POCT Blood Glucose.: 45 mg/dL (02-17-20 @ 12:11)  POCT Blood Glucose.: 132 mg/dL (02-17-20 @ 10:20)  POCT Blood Glucose.: 247 mg/dL (02-17-20 @ 09:26)  POCT Blood Glucose.: 151 mg/dL (02-16-20 @ 20:47)  POCT Blood Glucose.: 138 mg/dL (02-16-20 @ 17:17)  POCT Blood Glucose.: 154 mg/dL (02-16-20 @ 12:08)  POCT Blood Glucose.: 116 mg/dL (02-16-20 @ 08:10)  POCT Blood Glucose.: 99 mg/dL (02-16-20 @ 03:48)  POCT Blood Glucose.: 92 mg/dL (02-16-20 @ 02:53)  POCT Blood Glucose.: 91 mg/dL (02-16-20 @ 02:08)  POCT Blood Glucose.: 106 mg/dL (02-16-20 @ 01:22)  POCT Blood Glucose.: 147 mg/dL (02-16-20 @ 00:14)  POCT Blood Glucose.: 106 mg/dL (02-15-20 @ 23:07)  POCT Blood Glucose.: 79 mg/dL (02-15-20 @ 22:24)  POCT Blood Glucose.: 104 mg/dL (02-15-20 @ 21:12)  POCT Blood Glucose.: 143 mg/dL (02-15-20 @ 20:14)  POCT Blood Glucose.: 153 mg/dL (02-15-20 @ 19:01)  POCT Blood Glucose.: 139 mg/dL (02-15-20 @ 18:19)  POCT Blood Glucose.: 88 mg/dL (02-15-20 @ 17:10)  POCT Blood Glucose.: 125 mg/dL (02-15-20 @ 16:13)  POCT Blood Glucose.: 147 mg/dL (02-15-20 @ 15:02)  POCT Blood Glucose.: 167 mg/dL (02-15-20 @ 13:50)  POCT Blood Glucose.: 134 mg/dL (02-15-20 @ 11:54)  POCT Blood Glucose.: 151 mg/dL (02-15-20 @ 11:10)  POCT Blood Glucose.: 125 mg/dL (02-15-20 @ 08:24)  POCT Blood Glucose.: 110 mg/dL (02-15-20 @ 06:48)  POCT Blood Glucose.: 113 mg/dL (02-15-20 @ 05:57)  POCT Blood Glucose.: 112 mg/dL (02-15-20 @ 05:29)  POCT Blood Glucose.: 94 mg/dL (02-15-20 @ 04:00)  POCT Blood Glucose.: 82 mg/dL (02-15-20 @ 03:31)  POCT Blood Glucose.: 80 mg/dL (02-15-20 @ 02:51)  POCT Blood Glucose.: 112 mg/dL (02-15-20 @ 01:52)  POCT Blood Glucose.: 130 mg/dL (02-15-20 @ 01:17)  POCT Blood Glucose.: 142 mg/dL (02-15-20 @ 00:41)  POCT Blood Glucose.: 127 mg/dL (02-14-20 @ 22:51)  POCT Blood Glucose.: 109 mg/dL (02-14-20 @ 21:47)  POCT Blood Glucose.: 100 mg/dL (02-14-20 @ 21:07)  POCT Blood Glucose.: 156 mg/dL (02-14-20 @ 19:07)                            9.2    8.20  )-----------( 98       ( 17 Feb 2020 02:03 )             29.1       02-17    136  |  103  |  19  ----------------------------<  123<H>  4.6   |  25  |  1.08    EGFR if : 54<L>  EGFR if non : 47<L>    Ca    9.0      02-17  Mg     2.3     02-17  Phos  2.9     02-17    TPro  6.1  /  Alb  3.1<L>  /  TBili  0.7  /  DBili  x   /  AST  18  /  ALT  16  /  AlkPhos  42  02-17      Thyroid Function Tests:  02-11 @ 19:15 TSH 0.77 FreeT4 -- T3 -- Anti TPO -- Anti Thyroglobulin Ab -- TSI --      Hemoglobin A1C, Whole Blood: 6.4 % <H> [4.0 - 5.6] (02-11-20 @ 18:39)          Radiology:

## 2020-02-17 NOTE — PROGRESS NOTE ADULT - SUBJECTIVE AND OBJECTIVE BOX
CRITICAL CARE ATTENDING - CTICU    MEDICATIONS  (STANDING):  albuterol/ipratropium for Nebulization 3 milliLiter(s) Nebulizer every 8 hours  amLODIPine   Tablet 10 milliGRAM(s) Oral daily  aspirin enteric coated 81 milliGRAM(s) Oral daily  atorvastatin 10 milliGRAM(s) Oral at bedtime  buDESOnide    Inhalation Suspension 0.5 milliGRAM(s) Inhalation every 12 hours  chlorhexidine 2% Cloths 1 Application(s) Topical <User Schedule>  dextrose 5%. 1000 milliLiter(s) (50 mL/Hr) IV Continuous <Continuous>  dextrose 50% Injectable 12.5 Gram(s) IV Push once  dextrose 50% Injectable 25 Gram(s) IV Push once  dextrose 50% Injectable 25 Gram(s) IV Push once  furosemide    Tablet 40 milliGRAM(s) Oral daily  insulin lispro (HumaLOG) corrective regimen sliding scale   SubCutaneous three times a day before meals  insulin lispro (HumaLOG) corrective regimen sliding scale   SubCutaneous at bedtime  metoprolol tartrate 25 milliGRAM(s) Oral every 12 hours  pantoprazole    Tablet 40 milliGRAM(s) Oral before breakfast  piperacillin/tazobactam IVPB.. 2.25 Gram(s) IV Intermittent every 8 hours  polyethylene glycol 3350 17 Gram(s) Oral daily                                    9.2    8.20  )-----------( 98       ( 2020 02:03 )             29.1           136  |  103  |  19  ----------------------------<  123<H>  4.6   |  25  |  1.08    Ca    9.0      2020 02:03  Phos  2.9       Mg     2.3         TPro  6.1  /  Alb  3.1<L>  /  TBili  0.7  /  DBili  x   /  AST  18  /  ALT  16  /  AlkPhos  42        PT/INR - ( 2020 15:23 )   PT: 21.5 sec;   INR: 1.83 ratio                 Daily     Daily Weight in k.5 (2020 00:00)      -16 @ 07:01  -  -17 @ 07:00  --------------------------------------------------------  IN: 1210 mL / OUT: 2305 mL / NET: -1095 mL        Critically Ill patient  : [ ] preoperative ,   [x ] post operative    Requires :  [x ] Arterial Line   [x ] Central Line  [ ] PA catheter  [ ] IABP  [ ] ECMO  [ ] LVAD  [ ] Ventilator  [x ] pacemaker [ ] Impella.                      [x ] ABG's     [x] Pulse Oxymetry Monitoring  Bedside evaluation , monitoring , treatment of hemodynamics , fluids , IVP/ IVCD meds.        Diagnosis:     POD 2/12 - MVR / TV repair / LAAC     Hypertension    CHF- acute [x ]   chronic [x ]    systolic [ ]   diatolic [ ]          - Echo- EF -             [x ] RV dysfunction          - Cxr-cardiomegally, edema          - Clinical-  [ ]inotropes   [ ]pressors   [x ]diuresis   [ ]IABP   [ ]ECMO   [ ]LVAD   [ ]Respiratory Failure    Hemodynamic lability,  instability. Requires IVCD [ ] vasopressors [ ] inotropes  [ ] vasodilator  [ x]IVSS fluid  to maintain MAP, perfusion, C.I.     Thrombocytopenia     Temporary pacemaker (TPM) interrogation and setting.     LLLB opacification on cxr ? pneumonia ?    TTE     Anticoagulated with coumadin for A Fib / MVR                 By signing my name below, I, Maida Lucio, attest that this documentation has been prepared under the direction and in the presence of Ephraim Muñiz MD.   Electronically Signed: Mora Fuller 20 @ 07:01    Discussed with CT surgeon, Physician's Assistant - Nurse Practitioner- Critical care medicine team.   Dicussed at  AM / PM rounds.   Chart, labs , films reviewed.    Total Time: CRITICAL CARE ATTENDING - CTICU    MEDICATIONS  (STANDING):  albuterol/ipratropium for Nebulization 3 milliLiter(s) Nebulizer every 8 hours  amLODIPine   Tablet 10 milliGRAM(s) Oral daily  aspirin enteric coated 81 milliGRAM(s) Oral daily  atorvastatin 10 milliGRAM(s) Oral at bedtime  buDESOnide    Inhalation Suspension 0.5 milliGRAM(s) Inhalation every 12 hours  chlorhexidine 2% Cloths 1 Application(s) Topical <User Schedule>  dextrose 5%. 1000 milliLiter(s) (50 mL/Hr) IV Continuous <Continuous>  dextrose 50% Injectable 12.5 Gram(s) IV Push once  dextrose 50% Injectable 25 Gram(s) IV Push once  dextrose 50% Injectable 25 Gram(s) IV Push once  furosemide    Tablet 40 milliGRAM(s) Oral daily  insulin lispro (HumaLOG) corrective regimen sliding scale   SubCutaneous three times a day before meals  insulin lispro (HumaLOG) corrective regimen sliding scale   SubCutaneous at bedtime  metoprolol tartrate 25 milliGRAM(s) Oral every 12 hours  pantoprazole    Tablet 40 milliGRAM(s) Oral before breakfast  piperacillin/tazobactam IVPB.. 2.25 Gram(s) IV Intermittent every 8 hours  polyethylene glycol 3350 17 Gram(s) Oral daily                                    9.2    8.20  )-----------( 98       ( 2020 02:03 )             29.1           136  |  103  |  19  ----------------------------<  123<H>  4.6   |  25  |  1.08    Ca    9.0      2020 02:03  Phos  2.9       Mg     2.3         TPro  6.1  /  Alb  3.1<L>  /  TBili  0.7  /  DBili  x   /  AST  18  /  ALT  16  /  AlkPhos  42        PT/INR - ( 2020 15:23 )   PT: 21.5 sec;   INR: 1.83 ratio                 Daily     Daily Weight in k.5 (2020 00:00)      -16 @ 07:01  -  -17 @ 07:00  --------------------------------------------------------  IN: 1210 mL / OUT: 2305 mL / NET: -1095 mL        Critically Ill patient  : [ ] preoperative ,   [x ] post operative    Requires :  [x ] Arterial Line   [x ] Central Line  [ ] PA catheter  [ ] IABP  [ ] ECMO  [ ] LVAD  [ ] Ventilator  [x ] pacemaker [ ] Impella.                      [x ] ABG's     [x] Pulse Oxymetry Monitoring  Bedside evaluation , monitoring , treatment of hemodynamics , fluids , IVP/ IVCD meds.        Diagnosis:     POD 2/12 - MVR / TV repair / LAAC     Hypertension    CHF- acute [x ]   chronic [x ]    systolic [ ]   diatolic [ ]          - Echo- EF -             [x ] RV dysfunction          - Cxr-cardiomegally, edema          - Clinical-  [ ]inotropes   [ ]pressors   [x ]diuresis   [ ]IABP   [ ]ECMO   [ ]LVAD   [ ]Respiratory Failure    Hemodynamic lability,  instability. Requires IVCD [ ] vasopressors [ ] inotropes  [ ] vasodilator  [ x]IVSS fluid  to maintain MAP, perfusion, C.I.     Thrombocytopenia     Temporary pacemaker (TPM) interrogation and setting.     LLLB opacification on cxr ? pneumonia ?    Anticoagulated with coumadin for A Fib / MVR               By signing my name below, I, Maida Lucio, attest that this documentation has been prepared under the direction and in the presence of Ephraim Muñiz MD.   Electronically Signed: Mora Fuller 20 @ 07:01    Discussed with CT surgeon, Physician's Assistant - Nurse Practitioner- Critical care medicine team.   Dicussed at  AM / PM rounds.   Chart, labs , films reviewed.    Total Time: CRITICAL CARE ATTENDING - [facility]    MEDICATIONS  (STANDING):  albuterol/ipratropium for Nebulization 3 milliLiter(s) Nebulizer every 8 hours  amLODIPine   Tablet 10 milliGRAM(s) Oral daily  aspirin enteric coated 81 milliGRAM(s) Oral daily  atorvastatin 10 milliGRAM(s) Oral at bedtime  buDESOnide    Inhalation Suspension 0.5 milliGRAM(s) Inhalation every 12 hours  chlorhexidine 2% Cloths 1 Application(s) Topical <User Schedule>  dextrose 5%. 1000 milliLiter(s) (50 mL/Hr) IV Continuous <Continuous>  dextrose 50% Injectable 12.5 Gram(s) IV Push once  dextrose 50% Injectable 25 Gram(s) IV Push once  dextrose 50% Injectable 25 Gram(s) IV Push once  furosemide    Tablet 40 milliGRAM(s) Oral daily  insulin lispro (HumaLOG) corrective regimen sliding scale   SubCutaneous three times a day before meals  insulin lispro (HumaLOG) corrective regimen sliding scale   SubCutaneous at bedtime  metoprolol tartrate 25 milliGRAM(s) Oral every 12 hours  pantoprazole    Tablet 40 milliGRAM(s) Oral before breakfast  piperacillin/tazobactam IVPB.. 2.25 Gram(s) IV Intermittent every 8 hours  polyethylene glycol 3350 17 Gram(s) Oral daily                                    9.2    8.20  )-----------( 98       ( 2020 02:03 )             29.1           136  |  103  |  19  ----------------------------<  123<H>  4.6   |  25  |  1.08    Ca    9.0      2020 02:03  Phos  2.9       Mg     2.3         TPro  6.1  /  Alb  3.1<L>  /  TBili  0.7  /  DBili  x   /  AST  18  /  ALT  16  /  AlkPhos  42        PT/INR - ( 2020 15:23 )   PT: 21.5 sec;   INR: 1.83 ratio                 Daily     Daily Weight in k.5 (2020 00:00)      -16 @ 07:01  -  -17 @ 07:00  --------------------------------------------------------  IN: 1210 mL / OUT: 2305 mL / NET: -1095 mL        Critically Ill patient  : [ ] preoperative ,   [x ] post operative    Requires :  [ ] Arterial Line   [ ] Central Line  [ ] PA catheter  [ ] IABP  [ ] ECMO  [ ] LVAD  [ ] Ventilator  [x ] pacemaker [ ] Impella.                      [x ] ABG's     [x] Pulse Oxymetry Monitoring  Bedside evaluation , monitoring , treatment of hemodynamics , fluids , IVP/ IVCD meds.        Diagnosis:     POD 2/12 - MVR / TV repair / LAAC     Hypertension    CHF- acute [x ]   chronic [x ]    systolic [ ]   diatolic [ ]          - Echo- EF -             [x ] RV dysfunction          - Cxr-cardiomegally, edema          - Clinical-  [ ]inotropes   [ ]pressors   [x ]diuresis   [ ]IABP   [ ]ECMO   [ ]LVAD   [ ]Respiratory Failure    Hemodynamic lability,  instability. Requires IVCD [ ] vasopressors [ ] inotropes  [ ] vasodilator  [ x]IVSS fluid  to maintain MAP, perfusion, C.I.     Thrombocytopenia     Temporary pacemaker (TPM) interrogation and setting.     LLLB opacification on cxr ? pneumonia ? on antibiotics     Anticoagulated with coumadin for A Fib / MVR     Requires bedside physical therapy, mobilization and total intermediate care.     I, Ephraim Muñiz, personally performed the services described in this documentation. All medical record entries made by the scribe were at my direction and in my presence. I have reviewed the chart and agree that the record reflects my personal performance and is accurate and complete.   Ephraim Muñiz MD.               By signing my name below, I, Maida Lucio, attest that this documentation has been prepared under the direction and in the presence of Ephraim Muñiz MD.   Electronically Signed: Randy Fuller. 20 @ 07:01    Discussed with CT surgeon, Physician's Assistant - Nurse Practitioner- Critical care medicine team.   Dicussed at  AM / PM rounds.   Chart, labs , films reviewed.    Total Time: 20 min

## 2020-02-17 NOTE — CONSULT NOTE ADULT - PROBLEM SELECTOR RECOMMENDATION 9
Will  decrease correction scale coverage to low. Will continue monitoring FS, log, will Follow up.  Patient counseled for compliance with consistent low carb diet, exercise.

## 2020-02-18 LAB
ALBUMIN SERPL ELPH-MCNC: 3.2 G/DL — LOW (ref 3.3–5)
ALP SERPL-CCNC: 46 U/L — SIGNIFICANT CHANGE UP (ref 40–120)
ALT FLD-CCNC: 17 U/L — SIGNIFICANT CHANGE UP (ref 10–45)
ANION GAP SERPL CALC-SCNC: 10 MMOL/L — SIGNIFICANT CHANGE UP (ref 5–17)
APTT BLD: 32.2 SEC — SIGNIFICANT CHANGE UP (ref 27.5–36.3)
AST SERPL-CCNC: 18 U/L — SIGNIFICANT CHANGE UP (ref 10–40)
BILIRUB SERPL-MCNC: 0.6 MG/DL — SIGNIFICANT CHANGE UP (ref 0.2–1.2)
BUN SERPL-MCNC: 19 MG/DL — SIGNIFICANT CHANGE UP (ref 7–23)
CALCIUM SERPL-MCNC: 9.5 MG/DL — SIGNIFICANT CHANGE UP (ref 8.4–10.5)
CHLORIDE SERPL-SCNC: 98 MMOL/L — SIGNIFICANT CHANGE UP (ref 96–108)
CO2 SERPL-SCNC: 28 MMOL/L — SIGNIFICANT CHANGE UP (ref 22–31)
CREAT SERPL-MCNC: 1.11 MG/DL — SIGNIFICANT CHANGE UP (ref 0.5–1.3)
CULTURE RESULTS: SIGNIFICANT CHANGE UP
CULTURE RESULTS: SIGNIFICANT CHANGE UP
GLUCOSE SERPL-MCNC: 126 MG/DL — HIGH (ref 70–99)
HCT VFR BLD CALC: 30.8 % — LOW (ref 34.5–45)
HGB BLD-MCNC: 9.8 G/DL — LOW (ref 11.5–15.5)
INR BLD: 1.84 RATIO — HIGH (ref 0.88–1.16)
MAGNESIUM SERPL-MCNC: 2.1 MG/DL — SIGNIFICANT CHANGE UP (ref 1.6–2.6)
MCHC RBC-ENTMCNC: 29.5 PG — SIGNIFICANT CHANGE UP (ref 27–34)
MCHC RBC-ENTMCNC: 31.8 GM/DL — LOW (ref 32–36)
MCV RBC AUTO: 92.8 FL — SIGNIFICANT CHANGE UP (ref 80–100)
NRBC # BLD: 0 /100 WBCS — SIGNIFICANT CHANGE UP (ref 0–0)
PHOSPHATE SERPL-MCNC: 3.1 MG/DL — SIGNIFICANT CHANGE UP (ref 2.5–4.5)
PLATELET # BLD AUTO: 126 K/UL — LOW (ref 150–400)
POTASSIUM SERPL-MCNC: 4.7 MMOL/L — SIGNIFICANT CHANGE UP (ref 3.5–5.3)
POTASSIUM SERPL-SCNC: 4.7 MMOL/L — SIGNIFICANT CHANGE UP (ref 3.5–5.3)
PROT SERPL-MCNC: 6.3 G/DL — SIGNIFICANT CHANGE UP (ref 6–8.3)
PROTHROM AB SERPL-ACNC: 21.6 SEC — HIGH (ref 10–12.9)
RBC # BLD: 3.32 M/UL — LOW (ref 3.8–5.2)
RBC # FLD: 14.6 % — HIGH (ref 10.3–14.5)
SODIUM SERPL-SCNC: 136 MMOL/L — SIGNIFICANT CHANGE UP (ref 135–145)
SPECIMEN SOURCE: SIGNIFICANT CHANGE UP
SPECIMEN SOURCE: SIGNIFICANT CHANGE UP
SURGICAL PATHOLOGY STUDY: SIGNIFICANT CHANGE UP
WBC # BLD: 7.05 K/UL — SIGNIFICANT CHANGE UP (ref 3.8–10.5)
WBC # FLD AUTO: 7.05 K/UL — SIGNIFICANT CHANGE UP (ref 3.8–10.5)

## 2020-02-18 PROCEDURE — 71045 X-RAY EXAM CHEST 1 VIEW: CPT | Mod: 26

## 2020-02-18 RX ORDER — WARFARIN SODIUM 2.5 MG/1
2.5 TABLET ORAL ONCE
Refills: 0 | Status: DISCONTINUED | OUTPATIENT
Start: 2020-02-18 | End: 2020-02-18

## 2020-02-18 RX ORDER — METOPROLOL TARTRATE 50 MG
25 TABLET ORAL EVERY 8 HOURS
Refills: 0 | Status: DISCONTINUED | OUTPATIENT
Start: 2020-02-18 | End: 2020-02-22

## 2020-02-18 RX ADMIN — Medication 3 MILLILITER(S): at 21:48

## 2020-02-18 RX ADMIN — Medication 25 MILLIGRAM(S): at 05:43

## 2020-02-18 RX ADMIN — PIPERACILLIN AND TAZOBACTAM 25 GRAM(S): 4; .5 INJECTION, POWDER, LYOPHILIZED, FOR SOLUTION INTRAVENOUS at 23:01

## 2020-02-18 RX ADMIN — POLYETHYLENE GLYCOL 3350 17 GRAM(S): 17 POWDER, FOR SOLUTION ORAL at 11:09

## 2020-02-18 RX ADMIN — Medication 3 MILLILITER(S): at 05:52

## 2020-02-18 RX ADMIN — Medication 3 MILLILITER(S): at 15:13

## 2020-02-18 RX ADMIN — PIPERACILLIN AND TAZOBACTAM 25 GRAM(S): 4; .5 INJECTION, POWDER, LYOPHILIZED, FOR SOLUTION INTRAVENOUS at 15:12

## 2020-02-18 RX ADMIN — Medication 0.5 MILLIGRAM(S): at 05:43

## 2020-02-18 RX ADMIN — Medication 1: at 17:13

## 2020-02-18 RX ADMIN — Medication 25 MILLIGRAM(S): at 21:48

## 2020-02-18 RX ADMIN — Medication 25 MILLIGRAM(S): at 15:13

## 2020-02-18 RX ADMIN — Medication 81 MILLIGRAM(S): at 11:09

## 2020-02-18 RX ADMIN — PIPERACILLIN AND TAZOBACTAM 25 GRAM(S): 4; .5 INJECTION, POWDER, LYOPHILIZED, FOR SOLUTION INTRAVENOUS at 05:43

## 2020-02-18 RX ADMIN — PANTOPRAZOLE SODIUM 40 MILLIGRAM(S): 20 TABLET, DELAYED RELEASE ORAL at 07:35

## 2020-02-18 RX ADMIN — Medication 0.5 MILLIGRAM(S): at 17:13

## 2020-02-18 RX ADMIN — Medication 40 MILLIGRAM(S): at 05:43

## 2020-02-18 NOTE — PROGRESS NOTE ADULT - SUBJECTIVE AND OBJECTIVE BOX
Chief complaint  Patient is a 85y old  Female who presents with a chief complaint of Mitral stenosis (18 Feb 2020 07:03)   Review of systems  Patient in bed, looks comfortable, no new hypoglycemia.    Labs and Fingersticks  CAPILLARY BLOOD GLUCOSE      POCT Blood Glucose.: 110 mg/dL (18 Feb 2020 11:59)  POCT Blood Glucose.: 102 mg/dL (18 Feb 2020 07:34)  POCT Blood Glucose.: 122 mg/dL (17 Feb 2020 21:21)  POCT Blood Glucose.: 157 mg/dL (17 Feb 2020 17:04)      Anion Gap, Serum: 10 (02-18 @ 07:03)  Anion Gap, Serum: 8 (02-17 @ 02:03)      Calcium, Total Serum: 9.5 (02-18 @ 07:03)  Calcium, Total Serum: 9.0 (02-17 @ 02:03)  Albumin, Serum: 3.2 <L> (02-18 @ 07:03)  Albumin, Serum: 3.1 <L> (02-17 @ 02:03)    Alanine Aminotransferase (ALT/SGPT): 17 (02-18 @ 07:03)  Alanine Aminotransferase (ALT/SGPT): 16 (02-17 @ 02:03)  Alkaline Phosphatase, Serum: 46 (02-18 @ 07:03)  Alkaline Phosphatase, Serum: 42 (02-17 @ 02:03)  Aspartate Aminotransferase (AST/SGOT): 18 (02-18 @ 07:03)  Aspartate Aminotransferase (AST/SGOT): 18 (02-17 @ 02:03)        02-18    136  |  98  |  19  ----------------------------<  126<H>  4.7   |  28  |  1.11    Ca    9.5      18 Feb 2020 07:03  Phos  3.1     02-18  Mg     2.1     02-18    TPro  6.3  /  Alb  3.2<L>  /  TBili  0.6  /  DBili  x   /  AST  18  /  ALT  17  /  AlkPhos  46  02-18                        9.8    7.05  )-----------( 126      ( 18 Feb 2020 07:03 )             30.8     Medications  MEDICATIONS  (STANDING):  albuterol/ipratropium for Nebulization 3 milliLiter(s) Nebulizer every 8 hours  aspirin enteric coated 81 milliGRAM(s) Oral daily  buDESOnide    Inhalation Suspension 0.5 milliGRAM(s) Inhalation every 12 hours  dextrose 5%. 1000 milliLiter(s) (50 mL/Hr) IV Continuous <Continuous>  dextrose 50% Injectable 12.5 Gram(s) IV Push once  dextrose 50% Injectable 25 Gram(s) IV Push once  dextrose 50% Injectable 25 Gram(s) IV Push once  furosemide    Tablet 40 milliGRAM(s) Oral daily  insulin lispro (HumaLOG) corrective regimen sliding scale   SubCutaneous three times a day before meals  insulin lispro (HumaLOG) corrective regimen sliding scale   SubCutaneous at bedtime  metoprolol tartrate 25 milliGRAM(s) Oral every 8 hours  metoprolol tartrate 25 milliGRAM(s) Oral every 12 hours  pantoprazole    Tablet 40 milliGRAM(s) Oral before breakfast  piperacillin/tazobactam IVPB.. 3.375 Gram(s) IV Intermittent every 8 hours  polyethylene glycol 3350 17 Gram(s) Oral daily  warfarin 2.5 milliGRAM(s) Oral once      Physical Exam  General: Patient comfortable in bed  Vital Signs Last 12 Hrs  T(F): 98.3 (02-18-20 @ 16:00), Max: 98.3 (02-18-20 @ 16:00)  HR: 93 (02-18-20 @ 16:00) (85 - 96)  BP: 113/68 (02-18-20 @ 11:08) (113/68 - 113/68)  BP(mean): --  RR: 18 (02-18-20 @ 16:00) (18 - 18)  SpO2: 97% (02-18-20 @ 16:00) (94% - 97%)  Neck: No palpable thyroid nodules.  CVS: S1S2, No murmurs  Respiratory: No wheezing, no crepitations  GI: Abdomen soft, bowel sounds positive  Musculoskeletal:  edema lower extremities.   Skin: No skin rashes, no ecchymosis    Diagnostics Chief complaint  Patient is a 85y old  Female who presents with a chief complaint of Mitral stenosis (18 Feb 2020 07:03)   Review of systems  Patient in bed, looks comfortable, no  new hypoglycemia.    Labs and Fingersticks  CAPILLARY BLOOD GLUCOSE      POCT Blood Glucose.: 110 mg/dL (18 Feb 2020 11:59)  POCT Blood Glucose.: 102 mg/dL (18 Feb 2020 07:34)  POCT Blood Glucose.: 122 mg/dL (17 Feb 2020 21:21)  POCT Blood Glucose.: 157 mg/dL (17 Feb 2020 17:04)      Anion Gap, Serum: 10 (02-18 @ 07:03)  Anion Gap, Serum: 8 (02-17 @ 02:03)      Calcium, Total Serum: 9.5 (02-18 @ 07:03)  Calcium, Total Serum: 9.0 (02-17 @ 02:03)  Albumin, Serum: 3.2 <L> (02-18 @ 07:03)  Albumin, Serum: 3.1 <L> (02-17 @ 02:03)    Alanine Aminotransferase (ALT/SGPT): 17 (02-18 @ 07:03)  Alanine Aminotransferase (ALT/SGPT): 16 (02-17 @ 02:03)  Alkaline Phosphatase, Serum: 46 (02-18 @ 07:03)  Alkaline Phosphatase, Serum: 42 (02-17 @ 02:03)  Aspartate Aminotransferase (AST/SGOT): 18 (02-18 @ 07:03)  Aspartate Aminotransferase (AST/SGOT): 18 (02-17 @ 02:03)        02-18    136  |  98  |  19  ----------------------------<  126<H>  4.7   |  28  |  1.11    Ca    9.5      18 Feb 2020 07:03  Phos  3.1     02-18  Mg     2.1     02-18    TPro  6.3  /  Alb  3.2<L>  /  TBili  0.6  /  DBili  x   /  AST  18  /  ALT  17  /  AlkPhos  46  02-18                        9.8    7.05  )-----------( 126      ( 18 Feb 2020 07:03 )             30.8     Medications  MEDICATIONS  (STANDING):  albuterol/ipratropium for Nebulization 3 milliLiter(s) Nebulizer every 8 hours  aspirin enteric coated 81 milliGRAM(s) Oral daily  buDESOnide    Inhalation Suspension 0.5 milliGRAM(s) Inhalation every 12 hours  dextrose 5%. 1000 milliLiter(s) (50 mL/Hr) IV Continuous <Continuous>  dextrose 50% Injectable 12.5 Gram(s) IV Push once  dextrose 50% Injectable 25 Gram(s) IV Push once  dextrose 50% Injectable 25 Gram(s) IV Push once  furosemide    Tablet 40 milliGRAM(s) Oral daily  insulin lispro (HumaLOG) corrective regimen sliding scale   SubCutaneous three times a day before meals  insulin lispro (HumaLOG) corrective regimen sliding scale   SubCutaneous at bedtime  metoprolol tartrate 25 milliGRAM(s) Oral every 8 hours  metoprolol tartrate 25 milliGRAM(s) Oral every 12 hours  pantoprazole    Tablet 40 milliGRAM(s) Oral before breakfast  piperacillin/tazobactam IVPB.. 3.375 Gram(s) IV Intermittent every 8 hours  polyethylene glycol 3350 17 Gram(s) Oral daily  warfarin 2.5 milliGRAM(s) Oral once      Physical Exam  General: Patient comfortable in bed  Vital Signs Last 12 Hrs  T(F): 98.3 (02-18-20 @ 16:00), Max: 98.3 (02-18-20 @ 16:00)  HR: 93 (02-18-20 @ 16:00) (85 - 96)  BP: 113/68 (02-18-20 @ 11:08) (113/68 - 113/68)  BP(mean): --  RR: 18 (02-18-20 @ 16:00) (18 - 18)  SpO2: 97% (02-18-20 @ 16:00) (94% - 97%)  Neck: No palpable thyroid nodules.  CVS: S1S2, No murmurs  Respiratory: No wheezing, no crepitations  GI: Abdomen soft, bowel sounds positive  Musculoskeletal:  edema lower extremities.   Skin: No skin rashes, no ecchymosis    Diagnostics

## 2020-02-18 NOTE — PROGRESS NOTE ADULT - ASSESSMENT
85F PMHx chronic afib ( last coumadin 3 days ago ), macular degeneration, HTN, HLD, glaucoma and mitral stenosis.  Patient presents with CASTREJON on exertion  TTE severe mitral .  Cardiac cath reviewed by Dr Hall.   OR am for MVR  s/p  2/12 ; Mitral valve replaced with a #31 Irene Mitral Valve; Tricuspid valve repair with #34 annuloplasty ring; open excision of left atrial appendage and closure via primary repair	  ef 55%  Slow afib , paced.  + primacor, Dobutamine  + pressors  Extubated d 1  2/14  on Milrinone infusion for inotropic support due acute to RV systolic dysfunction .  Norvasc initiated 2/13 and Vasotec started for hypertension.   In addition, patient had been started on Aldactone and Torsemide.  Hypotension- sommer- norvasc and vasotec d/c  TTE no effusion: < from: Transthoracic Echocardiogram (02.16.20 @ 12:20) >  1. Bioprosthetic mitral valve replacement. Minimal mitral  regurgitation. Mean transmitral valve gradient equals 6 mm  Hg, which is elevated in the presence of a prosthetic  valve.  2. Calcified trileaflet aortic valve with normal opening.  Minimal aortic regurgitation.  3. Severely dilated left atrium.  LA volume index = 106  cc/m2.  4. Eccentric left ventricular hypertrophy (dilated left  ventricle with normal relative wall thickness).  5. Normal left ventricular systolic function. No segmental  wall motion abnormalities. Septal motion consistent with  cardiac surgery.  6. Normal right ventricular size and function.  7. An annuloplasty ring is seen in the tricuspid position.  Mild tricuspid regurgitation.  8. Estimated pulmonary artery systolic pressure equals 57  mm Hg, assuming right atrial pressure equals 8 mm Hg,  consistent with moderate pulmonary pressures.  9. Left pleural effusion.    < end of copied text >    Primacor d/c this am,2/17  LLL opacity - on Zosyn, sono minimal fluid  Beta blocker started, maximize prior to adding additional agents.  Diuretics added.  Hgb a1c 6.4 preop , was on insulin infusion until yestrerday.  She had a glucose of 247 this am, was trreated according to scale and transferred to sdu.  She had c/o dizziness and diaphoresis.  BS 46, 1/2 amp d50 and apple juice given.  Scale changed to low coverage and endocrine consulted.  2/18 Increase ambulation  Glucose control  F/u K, add aldactone.  Increase beta blocker    .

## 2020-02-18 NOTE — PROGRESS NOTE ADULT - PROBLEM SELECTOR PLAN 1
Will continue current insulin regimen for now. Will continue monitoring FS, log, and FU.  Patient counseled for compliance with consistent low carb diet and exercise as tolerated outpatient. Will continue current insulin regimen for now. Will continue monitoring FS, log, and FU.  Patient counseled for compliance with  consistent low carb diet and exercise as tolerated outpatient.

## 2020-02-18 NOTE — PROGRESS NOTE ADULT - ASSESSMENT
Assessment  Hypoglycemia: 85y Female with pre-diabetes, A1C 6.4%, now postop on insulin coverage, decreased dose yesterday, blood sugars improving and trending within acceptable range, no new hypoglycemic episodes. Patient is eating meals, appears comfortable.  MVR: S/P surgery, on medications, no chest pain, stable, monitored.  HTN: Controlled,  on antihypertensive medications.          Ginna Jean MD  Cell: [phone]  Office: [phone] Assessment  Hypoglycemia: 85y Female with pre-diabetes, A1C 6.4%, now postop on insulin coverage, decreased dose yesterday, blood sugars  improving and trending within acceptable range, no new hypoglycemic episodes. Patient is eating meals, appears comfortable.  MVR: S/P surgery, on medications, no chest pain, stable, monitored.  HTN: Controlled,  on antihypertensive medications.          Ginna Jean MD  Cell: [phone]  Office: [phone]

## 2020-02-18 NOTE — PROGRESS NOTE ADULT - SUBJECTIVE AND OBJECTIVE BOX
VITAL SIGNS    Telemetry:    Vital Signs Last 24 Hrs  T(C): 36.6 (20 @ 03:04), Max: 37.1 (20 @ 08:00)  T(F): 97.8 (20 @ 03:04), Max: 98.8 (20 @ 08:00)  HR: 86 (20 @ 06:42) (77 - 91)  BP: 134/63 (20 @ 03:04) (114/90 - 138/64)  RR: 17 (20 @ 03:04) (17 - 36)  SpO2: 97% (20 @ 06:42) (91% - 100%)                       9.2<L>                136  | 25   | 19           8.20  >-----------< 98<L>   ------------------------< 123<H>                 29.1<L>                4.6  | 103  | 1.08                                                                      Ca 9.0   Mg 2.3   Ph 2.9              20 @ 07:01  -  20 @ 07:00  --------------------------------------------------------  IN: 526 mL / OUT: 1450 mL / NET: -924 mL     14:45  PT24.4 INR2.09  PTT--   @ 15:23  PT21.5 INR1.83  PTT--   @ 01:39  PT17.0 INR1.47  PTT--  02-15 @ 13:56  PT16.7 INR1.45  PTT--  15 @ 00:56  PT14.6 INR1.26  PTT27.4   @ 16:27  PT15.2 INR1.31  PTT--   @ 01:05  PT17.8 INR1.54  PTT32.6      Daily     Daily Weight in k.9 (2020 07:00)        CAPILLARY BLOOD GLUCOSE      POCT Blood Glucose.: 122 mg/dL (2020 21:21)  POCT Blood Glucose.: 157 mg/dL (2020 17:04)  POCT Blood Glucose.: 147 mg/dL (2020 13:21)  POCT Blood Glucose.: 129 mg/dL (2020 13:00)  POCT Blood Glucose.: 117 mg/dL (2020 12:21)  POCT Blood Glucose.: 45 mg/dL (2020 12:11)  POCT Blood Glucose.: 132 mg/dL (2020 10:20)  POCT Blood Glucose.: 247 mg/dL (2020 09:26)                Coumadin    [ ] YES          [  ]      NO                                   PHYSICAL EXAM        Neurology: alert and oriented x 3, nonfocal, no gross deficits  CV : .S1S2 RRR  Sternal Wound :  CDI , Stable  Pacing Wires        [  ]   Settings:                                  Isolated  [  ]  Lungs: bibasilar crackles   Drains:     MS         [  ] Drainage:                 L Pleural  [  ]  Drainage:                R Pleural  [  ]  Drainage:  Abdomen: soft, nontender, nondistended, positive bowel sounds, last bowel movement   :         voiding    Extremities:     __ edema, ___calf tenderness.                            __svg site cdi          albuterol/ipratropium for Nebulization 3 milliLiter(s) Nebulizer every 8 hours  aspirin enteric coated 81 milliGRAM(s) Oral daily  buDESOnide    Inhalation Suspension 0.5 milliGRAM(s) Inhalation every 12 hours  dextrose 40% Gel 15 Gram(s) Oral once PRN  dextrose 5%. 1000 milliLiter(s) IV Continuous <Continuous>  dextrose 50% Injectable 12.5 Gram(s) IV Push once  dextrose 50% Injectable 25 Gram(s) IV Push once  dextrose 50% Injectable 25 Gram(s) IV Push once  furosemide    Tablet 40 milliGRAM(s) Oral daily  insulin lispro (HumaLOG) corrective regimen sliding scale   SubCutaneous three times a day before meals  insulin lispro (HumaLOG) corrective regimen sliding scale   SubCutaneous at bedtime  metoprolol tartrate 25 milliGRAM(s) Oral every 12 hours  oxycodone    5 mG/acetaminophen 325 mG 1 Tablet(s) Oral every 6 hours PRN  pantoprazole    Tablet 40 milliGRAM(s) Oral before breakfast  piperacillin/tazobactam IVPB.. 3.375 Gram(s) IV Intermittent every 8 hours  polyethylene glycol 3350 17 Gram(s) Oral daily                    Physical Therapy Rec:   Home  [  ]   Home w/ PT  [  ]  Rehab  [  ]  Discussed with Cardiothoracic Team at AM rounds. im ok    VITAL SIGNS    Telemetry:  af ib 88  Vital Signs Last 24 Hrs  T(C): 36.6 (20 @ 03:04), Max: 37.1 (20 @ 08:00)  T(F): 97.8 (20 @ 03:04), Max: 98.8 (20 @ 08:00)  HR: 86 (20 @ 06:42) (77 - 91)  BP: 134/63 (20 @ 03:04) (114/90 - 138/64)  RR: 17 (20 @ 03:04) (17 - 36)  SpO2: 97% (20 @ 06:42) (91% - 100%)                       9.2<L>                136  | 25   | 19           8.20  >-----------< 98<L>   ------------------------< 123<H>                 29.1<L>                4.6  | 103  | 1.08                                                                      Ca 9.0   Mg 2.3   Ph 2.9              20 @ 07:01  -  20 @ 07:00  --------------------------------------------------------  IN: 526 mL / OUT: 1450 mL / NET: -924 mL     14:45  PT24.4 INR2.09  PTT--   @ 15:23  PT21.5 INR1.83  PTT--   @ 01:39  PT17.0 INR1.47  PTT--  02-15 @ 13:56  PT16.7 INR1.45  PTT--  15 @ 00:56  PT14.6 INR1.26  PTT27.4   @ 16:27  PT15.2 INR1.31  PTT--   @ 01:05  PT17.8 INR1.54  PTT32.6      Daily     Daily Weight in k.9 (2020 07:00)        CAPILLARY BLOOD GLUCOSE      POCT Blood Glucose.: 122 mg/dL (2020 21:21)  POCT Blood Glucose.: 157 mg/dL (2020 17:04)  POCT Blood Glucose.: 147 mg/dL (2020 13:21)  POCT Blood Glucose.: 129 mg/dL (2020 13:00)  POCT Blood Glucose.: 117 mg/dL (2020 12:21)  POCT Blood Glucose.: 45 mg/dL (2020 12:11)  POCT Blood Glucose.: 132 mg/dL (2020 10:20)  POCT Blood Glucose.: 247 mg/dL (2020 09:26)                Coumadin    [ x] YES          [  ]      NO         a fib                          PHYSICAL EXAM        Neurology: alert and oriented x 3, nonfocal, no gross deficits  CV : irreg irreg  Sternal Wound :  CDI , Stable  Pacing Wires        [x  ]   Settings:        vvi                          Isolated  [  ]  Lungs: bibasilar crackles   Abdomen: soft, nontender, nondistended, positive bowel sounds, last bowel movement +  :         voiding    Extremities:     _-_ edema, ___-calf tenderness.                                   albuterol/ipratropium for Nebulization 3 milliLiter(s) Nebulizer every 8 hours  aspirin enteric coated 81 milliGRAM(s) Oral daily  buDESOnide    Inhalation Suspension 0.5 milliGRAM(s) Inhalation every 12 hours  dextrose 40% Gel 15 Gram(s) Oral once PRN  dextrose 5%. 1000 milliLiter(s) IV Continuous <Continuous>  dextrose 50% Injectable 12.5 Gram(s) IV Push once  dextrose 50% Injectable 25 Gram(s) IV Push once  dextrose 50% Injectable 25 Gram(s) IV Push once  furosemide    Tablet 40 milliGRAM(s) Oral daily  insulin lispro (HumaLOG) corrective regimen sliding scale   SubCutaneous three times a day before meals  insulin lispro (HumaLOG) corrective regimen sliding scale   SubCutaneous at bedtime  metoprolol tartrate 25 milliGRAM(s) Oral every 12 hours  oxycodone    5 mG/acetaminophen 325 mG 1 Tablet(s) Oral every 6 hours PRN  pantoprazole    Tablet 40 milliGRAM(s) Oral before breakfast  piperacillin/tazobactam IVPB.. 3.375 Gram(s) IV Intermittent every 8 hours  polyethylene glycol 3350 17 Gram(s) Oral daily                    Physical Therapy Rec:   Home  [  ]   Home w/ PT  [  ]  Rehab  [  ]  Discussed with Cardiothoracic Team at AM rounds.

## 2020-02-19 LAB
ALBUMIN SERPL ELPH-MCNC: 3.6 G/DL — SIGNIFICANT CHANGE UP (ref 3.3–5)
ALP SERPL-CCNC: 48 U/L — SIGNIFICANT CHANGE UP (ref 40–120)
ALT FLD-CCNC: 14 U/L — SIGNIFICANT CHANGE UP (ref 10–45)
ANION GAP SERPL CALC-SCNC: 13 MMOL/L — SIGNIFICANT CHANGE UP (ref 5–17)
APTT BLD: 30.1 SEC — SIGNIFICANT CHANGE UP (ref 27.5–36.3)
AST SERPL-CCNC: 17 U/L — SIGNIFICANT CHANGE UP (ref 10–40)
BILIRUB SERPL-MCNC: 0.7 MG/DL — SIGNIFICANT CHANGE UP (ref 0.2–1.2)
BUN SERPL-MCNC: 18 MG/DL — SIGNIFICANT CHANGE UP (ref 7–23)
CALCIUM SERPL-MCNC: 9.7 MG/DL — SIGNIFICANT CHANGE UP (ref 8.4–10.5)
CHLORIDE SERPL-SCNC: 97 MMOL/L — SIGNIFICANT CHANGE UP (ref 96–108)
CO2 SERPL-SCNC: 29 MMOL/L — SIGNIFICANT CHANGE UP (ref 22–31)
CREAT SERPL-MCNC: 1.05 MG/DL — SIGNIFICANT CHANGE UP (ref 0.5–1.3)
GLUCOSE SERPL-MCNC: 125 MG/DL — HIGH (ref 70–99)
HCT VFR BLD CALC: 32.9 % — LOW (ref 34.5–45)
HGB BLD-MCNC: 10.5 G/DL — LOW (ref 11.5–15.5)
INR BLD: 1.54 RATIO — HIGH (ref 0.88–1.16)
MAGNESIUM SERPL-MCNC: 2.1 MG/DL — SIGNIFICANT CHANGE UP (ref 1.6–2.6)
MCHC RBC-ENTMCNC: 29.3 PG — SIGNIFICANT CHANGE UP (ref 27–34)
MCHC RBC-ENTMCNC: 31.9 GM/DL — LOW (ref 32–36)
MCV RBC AUTO: 91.9 FL — SIGNIFICANT CHANGE UP (ref 80–100)
NRBC # BLD: 0 /100 WBCS — SIGNIFICANT CHANGE UP (ref 0–0)
PLATELET # BLD AUTO: 162 K/UL — SIGNIFICANT CHANGE UP (ref 150–400)
POTASSIUM SERPL-MCNC: 4 MMOL/L — SIGNIFICANT CHANGE UP (ref 3.5–5.3)
POTASSIUM SERPL-SCNC: 4 MMOL/L — SIGNIFICANT CHANGE UP (ref 3.5–5.3)
PROT SERPL-MCNC: 6.9 G/DL — SIGNIFICANT CHANGE UP (ref 6–8.3)
PROTHROM AB SERPL-ACNC: 17.9 SEC — HIGH (ref 10–12.9)
RBC # BLD: 3.58 M/UL — LOW (ref 3.8–5.2)
RBC # FLD: 14.4 % — SIGNIFICANT CHANGE UP (ref 10.3–14.5)
SODIUM SERPL-SCNC: 139 MMOL/L — SIGNIFICANT CHANGE UP (ref 135–145)
WBC # BLD: 7.78 K/UL — SIGNIFICANT CHANGE UP (ref 3.8–10.5)
WBC # FLD AUTO: 7.78 K/UL — SIGNIFICANT CHANGE UP (ref 3.8–10.5)

## 2020-02-19 PROCEDURE — 32556 INSERT CATH PLEURA W/O IMAGE: CPT | Mod: LT

## 2020-02-19 PROCEDURE — 71045 X-RAY EXAM CHEST 1 VIEW: CPT | Mod: 26

## 2020-02-19 RX ORDER — HYDROMORPHONE HYDROCHLORIDE 2 MG/ML
0.25 INJECTION INTRAMUSCULAR; INTRAVENOUS; SUBCUTANEOUS ONCE
Refills: 0 | Status: DISCONTINUED | OUTPATIENT
Start: 2020-02-19 | End: 2020-02-19

## 2020-02-19 RX ORDER — ACETAMINOPHEN 500 MG
650 TABLET ORAL ONCE
Refills: 0 | Status: COMPLETED | OUTPATIENT
Start: 2020-02-19 | End: 2020-02-19

## 2020-02-19 RX ORDER — WARFARIN SODIUM 2.5 MG/1
5 TABLET ORAL ONCE
Refills: 0 | Status: COMPLETED | OUTPATIENT
Start: 2020-02-19 | End: 2020-02-19

## 2020-02-19 RX ADMIN — Medication 25 MILLIGRAM(S): at 13:41

## 2020-02-19 RX ADMIN — Medication 81 MILLIGRAM(S): at 13:41

## 2020-02-19 RX ADMIN — Medication 0.5 MILLIGRAM(S): at 05:14

## 2020-02-19 RX ADMIN — PIPERACILLIN AND TAZOBACTAM 25 GRAM(S): 4; .5 INJECTION, POWDER, LYOPHILIZED, FOR SOLUTION INTRAVENOUS at 14:05

## 2020-02-19 RX ADMIN — WARFARIN SODIUM 5 MILLIGRAM(S): 2.5 TABLET ORAL at 21:49

## 2020-02-19 RX ADMIN — HYDROMORPHONE HYDROCHLORIDE 0.25 MILLIGRAM(S): 2 INJECTION INTRAMUSCULAR; INTRAVENOUS; SUBCUTANEOUS at 13:15

## 2020-02-19 RX ADMIN — Medication 0.5 MILLIGRAM(S): at 17:18

## 2020-02-19 RX ADMIN — HYDROMORPHONE HYDROCHLORIDE 0.25 MILLIGRAM(S): 2 INJECTION INTRAMUSCULAR; INTRAVENOUS; SUBCUTANEOUS at 12:45

## 2020-02-19 RX ADMIN — Medication 25 MILLIGRAM(S): at 21:49

## 2020-02-19 RX ADMIN — Medication 650 MILLIGRAM(S): at 23:12

## 2020-02-19 RX ADMIN — PIPERACILLIN AND TAZOBACTAM 25 GRAM(S): 4; .5 INJECTION, POWDER, LYOPHILIZED, FOR SOLUTION INTRAVENOUS at 21:49

## 2020-02-19 RX ADMIN — OXYCODONE AND ACETAMINOPHEN 1 TABLET(S): 5; 325 TABLET ORAL at 16:35

## 2020-02-19 RX ADMIN — POLYETHYLENE GLYCOL 3350 17 GRAM(S): 17 POWDER, FOR SOLUTION ORAL at 13:41

## 2020-02-19 RX ADMIN — PANTOPRAZOLE SODIUM 40 MILLIGRAM(S): 20 TABLET, DELAYED RELEASE ORAL at 05:15

## 2020-02-19 RX ADMIN — Medication 25 MILLIGRAM(S): at 05:15

## 2020-02-19 RX ADMIN — Medication 3 MILLILITER(S): at 05:14

## 2020-02-19 RX ADMIN — OXYCODONE AND ACETAMINOPHEN 1 TABLET(S): 5; 325 TABLET ORAL at 16:05

## 2020-02-19 RX ADMIN — Medication 650 MILLIGRAM(S): at 22:42

## 2020-02-19 RX ADMIN — PIPERACILLIN AND TAZOBACTAM 25 GRAM(S): 4; .5 INJECTION, POWDER, LYOPHILIZED, FOR SOLUTION INTRAVENOUS at 05:15

## 2020-02-19 RX ADMIN — Medication 3 MILLILITER(S): at 21:49

## 2020-02-19 RX ADMIN — Medication 3 MILLILITER(S): at 13:42

## 2020-02-19 RX ADMIN — Medication 40 MILLIGRAM(S): at 05:14

## 2020-02-19 NOTE — PROGRESS NOTE ADULT - ASSESSMENT
Assessment  Hypoglycemia: 85y Female with pre-diabetes, A1C 6.4%, postop on low-scale insulin coverage, blood sugars improved and trending within acceptable range, no new hypoglycemic episodes. Patient is eating meals, appears comfortable, family at the bedside.  MVR: S/P surgery, on medications, no chest pain, stable, monitored.  HTN: Controlled,  on antihypertensive medications.          Ginna Jean MD  Cell: [phone]  Office: [phone] Assessment  Hypoglycemia: 85y Female with pre-diabetes, A1C 6.4%, postop on low-scale insulin coverage, blood sugars improved and trending within acceptable range, no new hypoglycemic episodes. Patient is eating meals,  appears comfortable, family at the bedside.  MVR: S/P surgery, on medications, no chest pain, stable, monitored.  HTN: Controlled,  on antihypertensive medications.          Ginna Jean MD  Cell: [phone]  Office: [phone]

## 2020-02-19 NOTE — PROGRESS NOTE ADULT - PROBLEM SELECTOR PLAN 1
Will continue Humalog correction scale coverage for now. Will continue monitoring FS, log, and FU.  Patient in pre-diabetic range, blood sugars trending at target without management. No need to d/c on DM meds, patient was counseled for compliance with consistent low carb diet and exercise as tolerated outpatient.  Discussed the above plan with patient and family. Will FU outpatient endo in 3 months. Will continue Humalog correction scale coverage for now. Will continue monitoring FS, log, and FU.  Patient in pre-diabetic range, blood sugars trending at target without management. Patient does not want to start DM meds at this point, wants to do diet and exercise first, patient counseled for compliance with consistent low carb diet and exercise as tolerated outpatient.  Discussed the above plan with patient and family. Will FU outpatient endo in 3 months.

## 2020-02-19 NOTE — PROGRESS NOTE ADULT - ASSESSMENT
85F PMHx chronic afib ( last coumadin 3 days ago ), macular degeneration, HTN, HLD, glaucoma and mitral stenosis.  Patient presents with CASTREJON on exertion  TTE severe mitral .  Cardiac cath reviewed by Dr Hall.   OR am for MVR  s/p  2/12 ; Mitral valve replaced with a #31 Irene Mitral Valve; Tricuspid valve repair with #34 annuloplasty ring; open excision of left atrial appendage and closure via primary repair	  ef 55%  Slow afib , paced.  + primacor, Dobutamine  + pressors  Extubated d 1  2/14  on Milrinone infusion for inotropic support due acute to RV systolic dysfunction .  Norvasc initiated 2/13 and Vasotec started for hypertension.   In addition, patient had been started on Aldactone and Torsemide.  Hypotension- sommer- norvasc and vasotec d/c  TTE no effusion: < from: Transthoracic Echocardiogram (02.16.20 @ 12:20) >  1. Bioprosthetic mitral valve replacement. Minimal mitral  regurgitation. Mean transmitral valve gradient equals 6 mm  Hg, which is elevated in the presence of a prosthetic  valve.  2. Calcified trileaflet aortic valve with normal opening.  Minimal aortic regurgitation.  3. Severely dilated left atrium.  LA volume index = 106  cc/m2.  4. Eccentric left ventricular hypertrophy (dilated left  ventricle with normal relative wall thickness).  5. Normal left ventricular systolic function. No segmental  wall motion abnormalities. Septal motion consistent with  cardiac surgery.  6. Normal right ventricular size and function.  7. An annuloplasty ring is seen in the tricuspid position.  Mild tricuspid regurgitation.  8. Estimated pulmonary artery systolic pressure equals 57  mm Hg, assuming right atrial pressure equals 8 mm Hg,  consistent with moderate pulmonary pressures.  9. Left pleural effusion.    < end of copied text >    Primacor d/c this am,2/17  LLL opacity - on Zosyn, sono minimal fluid  Beta blocker started, maximize prior to adding additional agents.  Diuretics added.  Hgb a1c 6.4 preop , was on insulin infusion until yestrerday.  She had a glucose of 247 this am, was trreated according to scale and transferred to sdu.  She had c/o dizziness and diaphoresis.  BS 46, 1/2 amp d50 and apple juice given.  Scale changed to low coverage and endocrine consulted.  2/18 Increase ambulation Glucose controlF/u K, add aldactone.Increase beta blocker  2/19 VSS INR 1.54 negative -700 fluid balance US today today possible thoracentesis disposition to home     .

## 2020-02-19 NOTE — PROGRESS NOTE ADULT - PROBLEM SELECTOR PLAN 1
Asa, Statin, B-blocker, Chest PT,    Incentive spirometry, wound care and assessment.  Ambulate  +PW  Coumadin dosed by INR 1.54  H for thoracentesis  Dispostion to home

## 2020-02-19 NOTE — PROCEDURE NOTE - NSPROCDETAILS_GEN_ALL_CORE
Trendelenburg position/percutaneous/thoracostomy tube placed percutaneously/supine position/ultrasound assessment of fluid (size)/ultrasound assessment of fluid (location)/dressing applied/secured in place/sterile dressing applied/Seldinger technique

## 2020-02-19 NOTE — PROGRESS NOTE ADULT - SUBJECTIVE AND OBJECTIVE BOX
Subjective " I am feeling well"    VITAL SIGNS    Telemetry: Afib 86      Vital Signs Last 24 Hrs  T(C): 36.7 (20 @ 05:11), Max: 36.8 (20 @ 16:00)  T(F): 98.1 (20 @ 05:11), Max: 98.3 (20 @ 16:00)  HR: 79 (20 06:23) (74 - 96)  BP: 144/78 (20 @ 05:11) (113/68 - 144/78)  RR: 18 (20 @ 05:11) (18 - 18)  SpO2: 94% (20 @ 06:23) (92% - 97%)           -18 @ 07:01  -   @ 07:00  --------------------------------------------------------  IN: 800 mL / OUT: 2200 mL / NET: -1400 mL  Daily Weight in k.7 (2020 08                        10.5   7.78  )-----------( 162      ( 2020 06:51 )             32.9           139  |  97  |  18  ----------------------------<  125<H>  4.0   |  29  |  1.05    Ca    9.7      2020 06:51  Phos  3.1       Mg     2.1         TPro  6.9  /  Alb  3.6  /  TBili  0.7  /  DBili  x   /  AST  17  /  ALT  14  /  AlkPhos  48    PT/INR - ( 2020 06:51 )   PT: 17.9 sec;   INR: 1.54 ratio         PTT - ( 2020 06:51 )  PTT:30.1 nhg14-04 @ 07:01  -   @ 10:24  --------------------------------------------------------    CAPILLARY BLOOD GLUCOSEPOCT Blood Glucose.: 107 mg/dL (2020 07:26)  POCT Blood Glucose.: 133 mg/dL (2020 21:34)  POCT Blood Glucose.: 151 mg/dL (2020 16:56)  POCT Blood Glucose.: 110 mg/dL (2020 11:59  MEDICATIONS  (STANDING):  albuterol/ipratropium for Nebulization 3 milliLiter(s) Nebulizer every 8 hours  aspirin enteric coated 81 milliGRAM(s) Oral daily  buDESOnide    Inhalation Suspension 0.5 milliGRAM(s) Inhalation every 12 hourse  furosemide    Tablet 40 milliGRAM(s) Oral daily  insulin lispro (HumaLOG) corrective regimen sliding scale   SubCutaneous three times a day before meals  insulin lispro (HumaLOG) corrective regimen sliding scale   SubCutaneous at bedtime  metoprolol tartrate 25 milliGRAM(s) Oral every 8 hours  pantoprazole    Tablet 40 milliGRAM(s) Oral before breakfast  piperacillin/tazobactam IVPB.. 3.375 Gram(s) IV Intermittent every 8 hours  polyethylene glycol 3350 17 Gram(s) Oral daily    MEDICATIONS  (PRN):  dextrose 40% Gel 15 Gram(s) Oral once PRN Blood Glucose LESS THAN 70 milliGRAM(s)/deciliter  oxycodone    5 mG/acetaminophen 325 mG 1 Tablet(s) Oral every 6 hours PRN Severe Pain (7 - 10)      Pacing Wires        [  ]   Settings:                                  Isolated  [ x ]    Coumadin    [x ] YES          [  ]      NO         Reason:  AFIB/MVR                PHYSICAL EXAM  Neurology: alert and oriented x 3, nonfocal, no gross deficits  CV: S1 S2 IRRR -m-r-g + PW  Sternal Wound :  SOCRATES sternum Stable  Lungs: B/l breath sounds on room air diminished LLL  Abdomen: soft, nontender, nondistended, positive bowel sounds, last bowel movement    voids:             Extremities:   equal strength  B/L le warm well perfused + DP                                       Physical Therapy Rec:   Home  [ x ]   Home w/ PT  [  ]  Rehab  [  ]    Discussed with Cardiothoracic Team at AM rounds.

## 2020-02-20 DIAGNOSIS — Z96.89 PRESENCE OF OTHER SPECIFIED FUNCTIONAL IMPLANTS: ICD-10-CM

## 2020-02-20 LAB
ANION GAP SERPL CALC-SCNC: 12 MMOL/L — SIGNIFICANT CHANGE UP (ref 5–17)
APTT BLD: 29.4 SEC — SIGNIFICANT CHANGE UP (ref 27.5–36.3)
BUN SERPL-MCNC: 23 MG/DL — SIGNIFICANT CHANGE UP (ref 7–23)
CALCIUM SERPL-MCNC: 10.1 MG/DL — SIGNIFICANT CHANGE UP (ref 8.4–10.5)
CHLORIDE SERPL-SCNC: 94 MMOL/L — LOW (ref 96–108)
CO2 SERPL-SCNC: 29 MMOL/L — SIGNIFICANT CHANGE UP (ref 22–31)
CREAT SERPL-MCNC: 1.18 MG/DL — SIGNIFICANT CHANGE UP (ref 0.5–1.3)
GLUCOSE SERPL-MCNC: 112 MG/DL — HIGH (ref 70–99)
HCT VFR BLD CALC: 34.2 % — LOW (ref 34.5–45)
HGB BLD-MCNC: 11.1 G/DL — LOW (ref 11.5–15.5)
INR BLD: 1.34 RATIO — HIGH (ref 0.88–1.16)
INR BLD: 1.45 RATIO — HIGH (ref 0.88–1.16)
MCHC RBC-ENTMCNC: 29.8 PG — SIGNIFICANT CHANGE UP (ref 27–34)
MCHC RBC-ENTMCNC: 32.5 GM/DL — SIGNIFICANT CHANGE UP (ref 32–36)
MCV RBC AUTO: 91.9 FL — SIGNIFICANT CHANGE UP (ref 80–100)
NRBC # BLD: 0 /100 WBCS — SIGNIFICANT CHANGE UP (ref 0–0)
PLATELET # BLD AUTO: 208 K/UL — SIGNIFICANT CHANGE UP (ref 150–400)
POTASSIUM SERPL-MCNC: 4.6 MMOL/L — SIGNIFICANT CHANGE UP (ref 3.5–5.3)
POTASSIUM SERPL-SCNC: 4.6 MMOL/L — SIGNIFICANT CHANGE UP (ref 3.5–5.3)
PROTHROM AB SERPL-ACNC: 15.4 SEC — HIGH (ref 10–12.9)
PROTHROM AB SERPL-ACNC: 16.9 SEC — HIGH (ref 10–12.9)
RBC # BLD: 3.72 M/UL — LOW (ref 3.8–5.2)
RBC # FLD: 14.1 % — SIGNIFICANT CHANGE UP (ref 10.3–14.5)
SODIUM SERPL-SCNC: 135 MMOL/L — SIGNIFICANT CHANGE UP (ref 135–145)
WBC # BLD: 7.87 K/UL — SIGNIFICANT CHANGE UP (ref 3.8–10.5)
WBC # FLD AUTO: 7.87 K/UL — SIGNIFICANT CHANGE UP (ref 3.8–10.5)

## 2020-02-20 PROCEDURE — 71045 X-RAY EXAM CHEST 1 VIEW: CPT | Mod: 26

## 2020-02-20 RX ORDER — WARFARIN SODIUM 2.5 MG/1
5 TABLET ORAL ONCE
Refills: 0 | Status: COMPLETED | OUTPATIENT
Start: 2020-02-20 | End: 2020-02-20

## 2020-02-20 RX ORDER — SPIRONOLACTONE 25 MG/1
25 TABLET, FILM COATED ORAL DAILY
Refills: 0 | Status: DISCONTINUED | OUTPATIENT
Start: 2020-02-20 | End: 2020-02-22

## 2020-02-20 RX ADMIN — Medication 25 MILLIGRAM(S): at 13:22

## 2020-02-20 RX ADMIN — Medication 40 MILLIGRAM(S): at 05:21

## 2020-02-20 RX ADMIN — Medication 0.5 MILLIGRAM(S): at 17:24

## 2020-02-20 RX ADMIN — Medication 0.5 MILLIGRAM(S): at 05:20

## 2020-02-20 RX ADMIN — PIPERACILLIN AND TAZOBACTAM 25 GRAM(S): 4; .5 INJECTION, POWDER, LYOPHILIZED, FOR SOLUTION INTRAVENOUS at 05:21

## 2020-02-20 RX ADMIN — Medication 25 MILLIGRAM(S): at 05:21

## 2020-02-20 RX ADMIN — POLYETHYLENE GLYCOL 3350 17 GRAM(S): 17 POWDER, FOR SOLUTION ORAL at 08:42

## 2020-02-20 RX ADMIN — Medication 81 MILLIGRAM(S): at 08:42

## 2020-02-20 RX ADMIN — Medication 3 MILLILITER(S): at 21:45

## 2020-02-20 RX ADMIN — WARFARIN SODIUM 5 MILLIGRAM(S): 2.5 TABLET ORAL at 21:45

## 2020-02-20 RX ADMIN — PANTOPRAZOLE SODIUM 40 MILLIGRAM(S): 20 TABLET, DELAYED RELEASE ORAL at 05:21

## 2020-02-20 RX ADMIN — Medication 3 MILLILITER(S): at 13:22

## 2020-02-20 RX ADMIN — SPIRONOLACTONE 25 MILLIGRAM(S): 25 TABLET, FILM COATED ORAL at 19:37

## 2020-02-20 RX ADMIN — Medication 25 MILLIGRAM(S): at 21:45

## 2020-02-20 RX ADMIN — Medication 3 MILLILITER(S): at 05:19

## 2020-02-20 NOTE — PROGRESS NOTE ADULT - SUBJECTIVE AND OBJECTIVE BOX
VITAL SIGNS-Telemetry:  afib  (69)  Vital Signs Last 24 Hrs  T(C): 36.7 (02-20-20 @ 04:44), Max: 36.7 (02-19-20 @ 13:40)  T(F): 98.1 (02-20-20 @ 04:44), Max: 98.1 (02-19-20 @ 13:40)  HR: 68 (02-20-20 @ 05:57) (68 - 98)  BP: 134/80 (02-20-20 @ 04:44) (107/68 - 134/80)  RR: 18 (02-20-20 @ 04:44) (18 - 18)  SpO2: 98% (02-20-20 @ 05:57) (94% - 98%)         02-19 @ 07:01  -  02-20 @ 07:00  --------------------------------------------------------  IN: 940 mL / OUT: 2330 mL / NET: -1390 mL    02-20 @ 07:01  -  02-20 @ 09:08  --------------------------------------------------------  IN: 360 mL / OUT: 600 mL / NET: -240 mL    Daily     Daily     CAPILLARY BLOOD GLUCOSE  POCT Blood Glucose.: 112 mg/dL (20 Feb 2020 07:40)  POCT Blood Glucose.: 106 mg/dL (19 Feb 2020 21:42)  POCT Blood Glucose.: 124 mg/dL (19 Feb 2020 16:42)  POCT Blood Glucose.: 88 mg/dL (19 Feb 2020 11:33)        Drains:  L Pleural  [ x ]  Drainage:    50          Pacing Wires        [  ]   Settings:                                  Isolated  [  ]  Coumadin    [ ] YES          [  ]      NO         Reason:       PHYSICAL EXAM:  Neurology: alert and oriented x 3, nonfocal, no gross deficits  CV : irregular   Sternal Wound :  CDI , Stable  Lungs: CTa  Abdomen: soft, nontender, nondistended, positive bowel sounds, last bowel movement         Extremities:     no edema no calf tenderness    albuterol/ipratropium for Nebulization 3 milliLiter(s) Nebulizer every 8 hours  aspirin enteric coated 81 milliGRAM(s) Oral daily  buDESOnide    Inhalation Suspension 0.5 milliGRAM(s) Inhalation every 12 hours  dextrose 40% Gel 15 Gram(s) Oral once PRN  dextrose 5%. 1000 milliLiter(s) IV Continuous <Continuous>  dextrose 50% Injectable 12.5 Gram(s) IV Push once  dextrose 50% Injectable 25 Gram(s) IV Push once  dextrose 50% Injectable 25 Gram(s) IV Push once  furosemide    Tablet 40 milliGRAM(s) Oral daily  insulin lispro (HumaLOG) corrective regimen sliding scale   SubCutaneous three times a day before meals  insulin lispro (HumaLOG) corrective regimen sliding scale   SubCutaneous at bedtime  metoprolol tartrate 25 milliGRAM(s) Oral every 8 hours  oxycodone    5 mG/acetaminophen 325 mG 1 Tablet(s) Oral every 6 hours PRN  pantoprazole    Tablet 40 milliGRAM(s) Oral before breakfast  polyethylene glycol 3350 17 Gram(s) Oral daily      Physical Therapy Rec:   Home  [  ]   Home w/ PT  [  ]  Rehab  [  ]  Discussed with Cardiothoracic Team at AM rounds. VITAL SIGNS-Telemetry:  afib  (69)  Vital Signs Last 24 Hrs  T(C): 36.7 (02-20-20 @ 04:44), Max: 36.7 (02-19-20 @ 13:40)  T(F): 98.1 (02-20-20 @ 04:44), Max: 98.1 (02-19-20 @ 13:40)  HR: 68 (02-20-20 @ 05:57) (68 - 98)  BP: 134/80 (02-20-20 @ 04:44) (107/68 - 134/80)  RR: 18 (02-20-20 @ 04:44) (18 - 18)  SpO2: 98% (02-20-20 @ 05:57) (94% - 98%)         02-19 @ 07:01  -  02-20 @ 07:00  --------------------------------------------------------  IN: 940 mL / OUT: 2330 mL / NET: -1390 mL    02-20 @ 07:01  -  02-20 @ 09:08  --------------------------------------------------------  IN: 360 mL / OUT: 600 mL / NET: -240 mL    Daily     Daily     CAPILLARY BLOOD GLUCOSE  POCT Blood Glucose.: 112 mg/dL (20 Feb 2020 07:40)  POCT Blood Glucose.: 106 mg/dL (19 Feb 2020 21:42)  POCT Blood Glucose.: 124 mg/dL (19 Feb 2020 16:42)  POCT Blood Glucose.: 88 mg/dL (19 Feb 2020 11:33)        Drains:  L Pleural  [ x ]  Drainage:    50            Coumadin    [ x] YES          [  ]      NO         Reason:     afib/mvr  PHYSICAL EXAM:  Neurology: alert and oriented x 3, nonfocal, no gross deficits  CV : irregular   Sternal Wound :  CDI , Stable  Lungs: CTa  Abdomen: soft, nontender, nondistended, positive bowel sounds, last bowel movement    +bm 2/18     Extremities:     no edema no calf tenderness    albuterol/ipratropium for Nebulization 3 milliLiter(s) Nebulizer every 8 hours  aspirin enteric coated 81 milliGRAM(s) Oral daily  buDESOnide    Inhalation Suspension 0.5 milliGRAM(s) Inhalation every 12 hours  dextrose 40% Gel 15 Gram(s) Oral once PRN  dextrose 5%. 1000 milliLiter(s) IV Continuous <Continuous>  dextrose 50% Injectable 12.5 Gram(s) IV Push once  dextrose 50% Injectable 25 Gram(s) IV Push once  dextrose 50% Injectable 25 Gram(s) IV Push once  furosemide    Tablet 40 milliGRAM(s) Oral daily  insulin lispro (HumaLOG) corrective regimen sliding scale   SubCutaneous three times a day before meals  insulin lispro (HumaLOG) corrective regimen sliding scale   SubCutaneous at bedtime  metoprolol tartrate 25 milliGRAM(s) Oral every 8 hours  oxycodone    5 mG/acetaminophen 325 mG 1 Tablet(s) Oral every 6 hours PRN  pantoprazole    Tablet 40 milliGRAM(s) Oral before breakfast  polyethylene glycol 3350 17 Gram(s) Oral daily      Physical Therapy Rec:   Home  [ x ]   Home w/ PT  [  ]  Rehab  [  ]  Discussed with Cardiothoracic Team at AM rounds.

## 2020-02-20 NOTE — PROGRESS NOTE ADULT - ASSESSMENT
Assessment  Hypoglycemia: 85y Female with pre-diabetes, A1C 6.4%, postop on low-scale insulin coverage, blood sugars improved and trending within acceptable range, no new hypoglycemic episodes. Patient is eating meals, alert and comfortable.  MVR: S/P surgery, on medications, no chest pain, stable, monitored.  HTN: Controlled,  on antihypertensive medications.          Ginna Jean MD  Cell: [phone]  Office: [phone] Assessment  Hypoglycemia: 85y Female with pre-diabetes, A1C 6.4%, postop on low-scale insulin coverage,  blood sugars improved and trending within acceptable range, no new hypoglycemic episodes. Patient is eating meals, alert and comfortable.  MVR: S/P surgery, on medications, no chest pain, stable, monitored.  HTN: Controlled,  on antihypertensive medications.          Ginna Jean MD  Cell: [phone]  Office: [phone]

## 2020-02-20 NOTE — DIETITIAN INITIAL EVALUATION ADULT. - ADD RECOMMEND
Recommend continue current consistent CHO diet to promote glycemic control. Educated pt and family at bedside re: post-op and pre-DM diet recommendations; encouraged adequate intake of protein-dense foods, balanced consistent CHO meals to promote glycemic control; physical activity as tolerated and per MD discretion upon d/c and post-recovery, adequate intake of non-starchy vegetables; hypoglycemia prevention with insulin SS in-house. Pt voiced understanding.

## 2020-02-20 NOTE — DIETITIAN INITIAL EVALUATION ADULT. - ENERGY NEEDS
ht: 60 inches. wt: 98 pounds (current standing, +2 L leg, ankle, foot edema). BMI:  19.14 kG/m2. UBW: 100 pounds +/- 10%. IBW: 100 pounds +/- 10%. %IBW: 98%  Other pertinent objective information: Pt is an 85 year old with PMH chronic afib on Coumadin, macular degeneration, HTN, HLD, glaucoma and mitral stenosis.  Patient presents with CASTREJON on exertion TTE severe mitral. Is now s/p  2/12 MVR, Tricuspid valve repair with annuloplasty ring; open excision of left atrial appendage and closure via primary repair. HbA1c within pre-DM range 6.4%. Per endo note, pt/family resistant to start medication, instead to start lifestyle modifications and to follow-up with endo outpatient. On Humalog SS, noted with hypoglycemic episode in-house and SS adjusted to address. No pressure injuries noted.

## 2020-02-20 NOTE — PROGRESS NOTE ADULT - ASSESSMENT
85F PMHx chronic afib ( last coumadin 3 days ago ), macular degeneration, HTN, HLD, glaucoma and mitral stenosis.  Patient presents with CASTREJON on exertion  TTE severe mitral .  Cardiac cath reviewed by Dr Hall.   OR am for MVR  s/p  2/12 ; Mitral valve replaced with a #31 Irene Mitral Valve; Tricuspid valve repair with #34 annuloplasty ring; open excision of left atrial appendage and closure via primary repair	  ef 55%  Slow afib , paced.  + primacor, Dobutamine  + pressors  Extubated d 1  2/14  on Milrinone infusion for inotropic support due acute to RV systolic dysfunction .  Norvasc initiated 2/13 and Vasotec started for hypertension.   In addition, patient had been started on Aldactone and Torsemide.  Hypotension- sommer- norvasc and vasotec d/c  TTE no effusion: < from: Transthoracic Echocardiogram (02.16.20 @ 12:20) >  1. Bioprosthetic mitral valve replacement. Minimal mitral  regurgitation. Mean transmitral valve gradient equals 6 mm  Hg, which is elevated in the presence of a prosthetic  valve.  2. Calcified trileaflet aortic valve with normal opening.  Minimal aortic regurgitation.  3. Severely dilated left atrium.  LA volume index = 106  cc/m2.  4. Eccentric left ventricular hypertrophy (dilated left  ventricle with normal relative wall thickness).  5. Normal left ventricular systolic function. No segmental  wall motion abnormalities. Septal motion consistent with  cardiac surgery.  6. Normal right ventricular size and function.  7. An annuloplasty ring is seen in the tricuspid position.  Mild tricuspid regurgitation.  8. Estimated pulmonary artery systolic pressure equals 57  mm Hg, assuming right atrial pressure equals 8 mm Hg,  consistent with moderate pulmonary pressures.  9. Left pleural effusion.    Primacor d/c this am,2/17  LLL opacity - on Zosyn, sono minimal fluid  Beta blocker started, maximize prior to adding additional agents.  Diuretics added.  Hgb a1c 6.4 preop , was on insulin infusion until yestrerday.  She had a glucose of 247 this am, was trreated according to scale and transferred to sdu.  She had c/o dizziness and diaphoresis.  BS 46, 1/2 amp d50 and apple juice given.  Scale changed to low coverage and endocrine consulted.  2/18 Increase ambulation Glucose controlF/u K, add aldactone.Increase beta blocker  2/19 VSS INR 1.54 negative -700 fluid balance US today today possible thoracentesis disposition to home   2/20 VSS rounds made w/ Dr. Hall - will d/c L Pigtail catheter this am INR 1.34 will ck 2p INR-plan home Saturday.    .

## 2020-02-20 NOTE — DIETITIAN INITIAL EVALUATION ADULT. - OTHER INFO
Pt currently reports good po intake/appetite at this time, denies GI distress at this time. Pt denies food allergies or intolerance. Takes vitamin C and vitamin E supplements PTA per H and P. Reports typical intake includes oatmeal and berries or raisins, admits to snacking on cakes/sweets frequently. Says per her baseline she is not a big eater. Denies recent weight changes states UBW ~100 pounds consistent with current body wt 98 pounds. Pt and family at bedside voicing concerns re: HBA1c 6.4%, interested in discussing recommendations. Pt aware of Coumadin-dietary interaction as she was on Coumadin PTA; is able to teach-back consistent vitamin K recommendations.

## 2020-02-20 NOTE — PROGRESS NOTE ADULT - SUBJECTIVE AND OBJECTIVE BOX
Chief complaint  Patient is a 85y old  Female who presents with a chief complaint of Mitral stenosis (20 Feb 2020 09:08)   Review of systems  Patient in bed, looks comfortable, no hypoglycemia.    Labs and Fingersticks  CAPILLARY BLOOD GLUCOSE      POCT Blood Glucose.: 101 mg/dL (20 Feb 2020 11:39)  POCT Blood Glucose.: 112 mg/dL (20 Feb 2020 07:40)  POCT Blood Glucose.: 106 mg/dL (19 Feb 2020 21:42)  POCT Blood Glucose.: 124 mg/dL (19 Feb 2020 16:42)      Anion Gap, Serum: 12 (02-20 @ 06:48)  Anion Gap, Serum: 13 (02-19 @ 06:51)      Calcium, Total Serum: 10.1 (02-20 @ 06:48)  Calcium, Total Serum: 9.7 (02-19 @ 06:51)  Albumin, Serum: 3.6 (02-19 @ 06:51)    Alanine Aminotransferase (ALT/SGPT): 14 (02-19 @ 06:51)  Alkaline Phosphatase, Serum: 48 (02-19 @ 06:51)  Aspartate Aminotransferase (AST/SGOT): 17 (02-19 @ 06:51)        02-20    135  |  94<L>  |  23  ----------------------------<  112<H>  4.6   |  29  |  1.18    Ca    10.1      20 Feb 2020 06:48  Mg     2.1     02-19    TPro  6.9  /  Alb  3.6  /  TBili  0.7  /  DBili  x   /  AST  17  /  ALT  14  /  AlkPhos  48  02-19                        11.1   7.87  )-----------( 208      ( 20 Feb 2020 06:48 )             34.2     Medications  MEDICATIONS  (STANDING):  albuterol/ipratropium for Nebulization 3 milliLiter(s) Nebulizer every 8 hours  aspirin enteric coated 81 milliGRAM(s) Oral daily  buDESOnide    Inhalation Suspension 0.5 milliGRAM(s) Inhalation every 12 hours  dextrose 5%. 1000 milliLiter(s) (50 mL/Hr) IV Continuous <Continuous>  dextrose 50% Injectable 12.5 Gram(s) IV Push once  dextrose 50% Injectable 25 Gram(s) IV Push once  dextrose 50% Injectable 25 Gram(s) IV Push once  furosemide    Tablet 40 milliGRAM(s) Oral daily  insulin lispro (HumaLOG) corrective regimen sliding scale   SubCutaneous three times a day before meals  insulin lispro (HumaLOG) corrective regimen sliding scale   SubCutaneous at bedtime  metoprolol tartrate 25 milliGRAM(s) Oral every 8 hours  pantoprazole    Tablet 40 milliGRAM(s) Oral before breakfast  polyethylene glycol 3350 17 Gram(s) Oral daily      Physical Exam  General: Patient comfortable in bed  Vital Signs Last 12 Hrs  T(F): 98.1 (02-20-20 @ 04:44), Max: 98.1 (02-20-20 @ 04:44)  HR: 66 (02-20-20 @ 09:00) (66 - 80)  BP: 134/80 (02-20-20 @ 04:44) (134/80 - 134/80)  BP(mean): 98 (02-20-20 @ 04:44) (98 - 98)  RR: 18 (02-20-20 @ 04:44) (18 - 18)  SpO2: 97% (02-20-20 @ 09:00) (97% - 98%)  Neck: No palpable thyroid nodules.  CVS: S1S2, No murmurs  Respiratory: No wheezing, no crepitations  GI: Abdomen soft, bowel sounds positive  Musculoskeletal:  edema lower extremities.   Skin: No skin rashes, no ecchymosis    Diagnostics Chief complaint  Patient is a 85y old  Female who presents with a chief complaint of Mitral stenosis (20 Feb 2020 09:08)   Review of systems  Patient in bed, looks comfortable, no  hypoglycemia.    Labs and Fingersticks  CAPILLARY BLOOD GLUCOSE      POCT Blood Glucose.: 101 mg/dL (20 Feb 2020 11:39)  POCT Blood Glucose.: 112 mg/dL (20 Feb 2020 07:40)  POCT Blood Glucose.: 106 mg/dL (19 Feb 2020 21:42)  POCT Blood Glucose.: 124 mg/dL (19 Feb 2020 16:42)      Anion Gap, Serum: 12 (02-20 @ 06:48)  Anion Gap, Serum: 13 (02-19 @ 06:51)      Calcium, Total Serum: 10.1 (02-20 @ 06:48)  Calcium, Total Serum: 9.7 (02-19 @ 06:51)  Albumin, Serum: 3.6 (02-19 @ 06:51)    Alanine Aminotransferase (ALT/SGPT): 14 (02-19 @ 06:51)  Alkaline Phosphatase, Serum: 48 (02-19 @ 06:51)  Aspartate Aminotransferase (AST/SGOT): 17 (02-19 @ 06:51)        02-20    135  |  94<L>  |  23  ----------------------------<  112<H>  4.6   |  29  |  1.18    Ca    10.1      20 Feb 2020 06:48  Mg     2.1     02-19    TPro  6.9  /  Alb  3.6  /  TBili  0.7  /  DBili  x   /  AST  17  /  ALT  14  /  AlkPhos  48  02-19                        11.1   7.87  )-----------( 208      ( 20 Feb 2020 06:48 )             34.2     Medications  MEDICATIONS  (STANDING):  albuterol/ipratropium for Nebulization 3 milliLiter(s) Nebulizer every 8 hours  aspirin enteric coated 81 milliGRAM(s) Oral daily  buDESOnide    Inhalation Suspension 0.5 milliGRAM(s) Inhalation every 12 hours  dextrose 5%. 1000 milliLiter(s) (50 mL/Hr) IV Continuous <Continuous>  dextrose 50% Injectable 12.5 Gram(s) IV Push once  dextrose 50% Injectable 25 Gram(s) IV Push once  dextrose 50% Injectable 25 Gram(s) IV Push once  furosemide    Tablet 40 milliGRAM(s) Oral daily  insulin lispro (HumaLOG) corrective regimen sliding scale   SubCutaneous three times a day before meals  insulin lispro (HumaLOG) corrective regimen sliding scale   SubCutaneous at bedtime  metoprolol tartrate 25 milliGRAM(s) Oral every 8 hours  pantoprazole    Tablet 40 milliGRAM(s) Oral before breakfast  polyethylene glycol 3350 17 Gram(s) Oral daily      Physical Exam  General: Patient comfortable in bed  Vital Signs Last 12 Hrs  T(F): 98.1 (02-20-20 @ 04:44), Max: 98.1 (02-20-20 @ 04:44)  HR: 66 (02-20-20 @ 09:00) (66 - 80)  BP: 134/80 (02-20-20 @ 04:44) (134/80 - 134/80)  BP(mean): 98 (02-20-20 @ 04:44) (98 - 98)  RR: 18 (02-20-20 @ 04:44) (18 - 18)  SpO2: 97% (02-20-20 @ 09:00) (97% - 98%)  Neck: No palpable thyroid nodules.  CVS: S1S2, No murmurs  Respiratory: No wheezing, no crepitations  GI: Abdomen soft, bowel sounds positive  Musculoskeletal:  edema lower extremities.   Skin: No skin rashes, no ecchymosis    Diagnostics

## 2020-02-20 NOTE — PROGRESS NOTE ADULT - PROBLEM SELECTOR PLAN 1
Will continue Humalog correction scale coverage for now. Will continue monitoring FS and FU.  Patient in pre-diabetic range, A1C 6.4%, blood sugars trending at target with minimal management. Patient and family at the bedside express reluctance to start DM meds at this point; Patient wants to do diet and exercise first, patient counseled for compliance with consistent low carb diet and exercise as tolerated outpatient.  Discussed the above plan with patient and family. Will FU outpatient endo in 3 months.

## 2020-02-21 ENCOUNTER — TRANSCRIPTION ENCOUNTER (OUTPATIENT)
Age: 85
End: 2020-02-21

## 2020-02-21 LAB
ANION GAP SERPL CALC-SCNC: 14 MMOL/L — SIGNIFICANT CHANGE UP (ref 5–17)
APTT BLD: 30.5 SEC — SIGNIFICANT CHANGE UP (ref 27.5–36.3)
BUN SERPL-MCNC: 21 MG/DL — SIGNIFICANT CHANGE UP (ref 7–23)
CALCIUM SERPL-MCNC: 9.8 MG/DL — SIGNIFICANT CHANGE UP (ref 8.4–10.5)
CHLORIDE SERPL-SCNC: 99 MMOL/L — SIGNIFICANT CHANGE UP (ref 96–108)
CO2 SERPL-SCNC: 29 MMOL/L — SIGNIFICANT CHANGE UP (ref 22–31)
CREAT SERPL-MCNC: 1.01 MG/DL — SIGNIFICANT CHANGE UP (ref 0.5–1.3)
GLUCOSE SERPL-MCNC: 123 MG/DL — HIGH (ref 70–99)
HCT VFR BLD CALC: 33.8 % — LOW (ref 34.5–45)
HGB BLD-MCNC: 10.7 G/DL — LOW (ref 11.5–15.5)
INR BLD: 1.34 RATIO — HIGH (ref 0.88–1.16)
MCHC RBC-ENTMCNC: 29.1 PG — SIGNIFICANT CHANGE UP (ref 27–34)
MCHC RBC-ENTMCNC: 31.7 GM/DL — LOW (ref 32–36)
MCV RBC AUTO: 91.8 FL — SIGNIFICANT CHANGE UP (ref 80–100)
NRBC # BLD: 0 /100 WBCS — SIGNIFICANT CHANGE UP (ref 0–0)
PLATELET # BLD AUTO: 242 K/UL — SIGNIFICANT CHANGE UP (ref 150–400)
POTASSIUM SERPL-MCNC: 3.8 MMOL/L — SIGNIFICANT CHANGE UP (ref 3.5–5.3)
POTASSIUM SERPL-SCNC: 3.8 MMOL/L — SIGNIFICANT CHANGE UP (ref 3.5–5.3)
PROTHROM AB SERPL-ACNC: 15.6 SEC — HIGH (ref 10–12.9)
RBC # BLD: 3.68 M/UL — LOW (ref 3.8–5.2)
RBC # FLD: 14.1 % — SIGNIFICANT CHANGE UP (ref 10.3–14.5)
SODIUM SERPL-SCNC: 142 MMOL/L — SIGNIFICANT CHANGE UP (ref 135–145)
WBC # BLD: 7.41 K/UL — SIGNIFICANT CHANGE UP (ref 3.8–10.5)
WBC # FLD AUTO: 7.41 K/UL — SIGNIFICANT CHANGE UP (ref 3.8–10.5)

## 2020-02-21 RX ORDER — WARFARIN SODIUM 2.5 MG/1
7.5 TABLET ORAL ONCE
Refills: 0 | Status: COMPLETED | OUTPATIENT
Start: 2020-02-21 | End: 2020-02-21

## 2020-02-21 RX ORDER — FUROSEMIDE 40 MG
20 TABLET ORAL DAILY
Refills: 0 | Status: DISCONTINUED | OUTPATIENT
Start: 2020-02-22 | End: 2020-02-22

## 2020-02-21 RX ADMIN — Medication 0.5 MILLIGRAM(S): at 17:21

## 2020-02-21 RX ADMIN — Medication 25 MILLIGRAM(S): at 14:45

## 2020-02-21 RX ADMIN — Medication 25 MILLIGRAM(S): at 21:41

## 2020-02-21 RX ADMIN — Medication 25 MILLIGRAM(S): at 05:06

## 2020-02-21 RX ADMIN — Medication 40 MILLIGRAM(S): at 05:06

## 2020-02-21 RX ADMIN — Medication 3 MILLILITER(S): at 14:45

## 2020-02-21 RX ADMIN — Medication 0.5 MILLIGRAM(S): at 05:07

## 2020-02-21 RX ADMIN — SPIRONOLACTONE 25 MILLIGRAM(S): 25 TABLET, FILM COATED ORAL at 05:06

## 2020-02-21 RX ADMIN — PANTOPRAZOLE SODIUM 40 MILLIGRAM(S): 20 TABLET, DELAYED RELEASE ORAL at 05:06

## 2020-02-21 RX ADMIN — WARFARIN SODIUM 7.5 MILLIGRAM(S): 2.5 TABLET ORAL at 21:40

## 2020-02-21 RX ADMIN — Medication 3 MILLILITER(S): at 21:41

## 2020-02-21 RX ADMIN — Medication 81 MILLIGRAM(S): at 11:54

## 2020-02-21 RX ADMIN — POLYETHYLENE GLYCOL 3350 17 GRAM(S): 17 POWDER, FOR SOLUTION ORAL at 11:54

## 2020-02-21 RX ADMIN — Medication 3 MILLILITER(S): at 05:06

## 2020-02-21 NOTE — PROGRESS NOTE ADULT - PROBLEM SELECTOR PROBLEM 3
HTN (hypertension)
Hypoglycemia

## 2020-02-21 NOTE — PROGRESS NOTE ADULT - PROBLEM SELECTOR PLAN 2
Asa, Statin, B-blocker, Chest PT,  Incentive spirometry, wound care and assessment.  Ambulate
On medications,  no chest pain, stable, monitored and followed up by primary cardiothoracic team/cardiology team.
Asa, Statin, B-blocker, Chest PT,  Incentive spirometry, wound care and assessment.  Ambulate

## 2020-02-21 NOTE — PROGRESS NOTE ADULT - SUBJECTIVE AND OBJECTIVE BOX
VITAL SIGNS-Telemetry: afib 61   Vital Signs Last 24 Hrs  T(C): 37.1 (20 @ 05:10), Max: 37.1 (20 @ 05:10)  T(F): 98.7 (20 @ 05:10), Max: 98.7 (20 @ 05:10)  HR: 78 (20 @ 05:10) (67 - 85)  BP: 156/71 (20 @ 05:10) (118/69 - 156/71)  RR: 18 (20 @ 05:10) (18 - 18)  SpO2: 96% (20 @ 05:10) (94% - 98%)          @ 07:01  -   @ 07:00  --------------------------------------------------------  IN: 870 mL / OUT: 3310 mL / NET: -2440 mL     @ 07:01  -   @ 12:08  --------------------------------------------------------  IN: 360 mL / OUT: 400 mL / NET: -40 mL    Daily     Daily Weight in k.5 (2020 11:15)    CAPILLARY BLOOD GLUCOSE  POCT Blood Glucose.: 128 mg/dL (2020 11:46)  POCT Blood Glucose.: 120 mg/dL (2020 07:45)  POCT Blood Glucose.: 112 mg/dL (2020 21:39)  POCT Blood Glucose.: 108 mg/dL (2020 16:47)        Coumadin    [ x] YES          [  ]      NO         Reason:     mvr/afib  PHYSICAL EXAM:  Neurology: alert and oriented x 3, nonfocal, no gross deficits  CV : irregular   Sternal Wound :  CDI , Stable  Lungs: cta  Abdomen: soft, nontender, nondistended, positive bowel sounds, last bowel movement     +bm    Extremities:     no edema no calf tenderness    albuterol/ipratropium for Nebulization 3 milliLiter(s) Nebulizer every 8 hours  aspirin enteric coated 81 milliGRAM(s) Oral daily  buDESOnide    Inhalation Suspension 0.5 milliGRAM(s) Inhalation every 12 hours  dextrose 40% Gel 15 Gram(s) Oral once PRN  dextrose 5%. 1000 milliLiter(s) IV Continuous <Continuous>  dextrose 50% Injectable 12.5 Gram(s) IV Push once  dextrose 50% Injectable 25 Gram(s) IV Push once  dextrose 50% Injectable 25 Gram(s) IV Push once  furosemide    Tablet 40 milliGRAM(s) Oral daily  insulin lispro (HumaLOG) corrective regimen sliding scale   SubCutaneous three times a day before meals  insulin lispro (HumaLOG) corrective regimen sliding scale   SubCutaneous at bedtime  metoprolol tartrate 25 milliGRAM(s) Oral every 8 hours  pantoprazole    Tablet 40 milliGRAM(s) Oral before breakfast  polyethylene glycol 3350 17 Gram(s) Oral daily  spironolactone 25 milliGRAM(s) Oral daily      Physical Therapy Rec:   Home  [x  ]   Home w/ PT  [  ]  Rehab  [  ]  Discussed with Cardiothoracic Team at AM rounds.

## 2020-02-21 NOTE — PROGRESS NOTE ADULT - REASON FOR ADMISSION
Mitral stenosis

## 2020-02-21 NOTE — PROGRESS NOTE ADULT - PROBLEM SELECTOR PROBLEM 1
Hypoglycemia
S/P mitral valve replacement

## 2020-02-21 NOTE — PROGRESS NOTE ADULT - PROBLEM SELECTOR PROBLEM 2
Mitral stenosis
S/P TVR (tricuspid valve repair)

## 2020-02-21 NOTE — PROGRESS NOTE ADULT - ASSESSMENT
85F PMHx chronic afib ( last coumadin 3 days ago ), macular degeneration, HTN, HLD, glaucoma and mitral stenosis.  Patient presents with CASTREJON on exertion  TTE severe mitral .  Cardiac cath reviewed by Dr Hall.   OR am for MVR  s/p  2/12 ; Mitral valve replaced with a #31 Irene Mitral Valve; Tricuspid valve repair with #34 annuloplasty ring; open excision of left atrial appendage and closure via primary repair	  ef 55%  Slow afib , paced.  + primacor, Dobutamine  + pressors  Extubated d 1  2/14  on Milrinone infusion for inotropic support due acute to RV systolic dysfunction .  Norvasc initiated 2/13 and Vasotec started for hypertension.   In addition, patient had been started on Aldactone and Torsemide.  Hypotension- sommer- norvasc and vasotec d/c  TTE no effusion: < from: Transthoracic Echocardiogram (02.16.20 @ 12:20) >  1. Bioprosthetic mitral valve replacement. Minimal mitral  regurgitation. Mean transmitral valve gradient equals 6 mm  Hg, which is elevated in the presence of a prosthetic  valve.  2. Calcified trileaflet aortic valve with normal opening.  Minimal aortic regurgitation.  3. Severely dilated left atrium.  LA volume index = 106  cc/m2.  4. Eccentric left ventricular hypertrophy (dilated left  ventricle with normal relative wall thickness).  5. Normal left ventricular systolic function. No segmental  wall motion abnormalities. Septal motion consistent with  cardiac surgery.  6. Normal right ventricular size and function.  7. An annuloplasty ring is seen in the tricuspid position.  Mild tricuspid regurgitation.  8. Estimated pulmonary artery systolic pressure equals 57  mm Hg, assuming right atrial pressure equals 8 mm Hg,  consistent with moderate pulmonary pressures.  9. Left pleural effusion.    Primacor d/c this am,2/17  LLL opacity - on Zosyn, sono minimal fluid  Beta blocker started, maximize prior to adding additional agents.  Diuretics added.  Hgb a1c 6.4 preop , was on insulin infusion until yestrerday.  She had a glucose of 247 this am, was trreated according to scale and transferred to sdu.  She had c/o dizziness and diaphoresis.  BS 46, 1/2 amp d50 and apple juice given.  Scale changed to low coverage and endocrine consulted.  2/18 Increase ambulation Glucose controlF/u K, add aldactone.Increase beta blocker  2/19 VSS INR 1.54 negative -700 fluid balance US today today possible thoracentesis disposition to home   2/20 VSS rounds made w/ Dr. Hall - will d/c L Pigtail catheter this am INR 1.34 will ck 2p INR-plan home Saturday.  2/21 feet elevated - aldactone added last evening - pt w/o edema this am - anticoagulate w/ Coumadin - d/c home saturday  .

## 2020-02-21 NOTE — PROGRESS NOTE ADULT - SUBJECTIVE AND OBJECTIVE BOX
Chief complaint  Patient is a 85y old  Female who presents with a chief complaint of Mitral stenosis (21 Feb 2020 12:08)   Review of systems  Patient in bed, looks comfortable, no hypoglycemia.    Labs and Fingersticks  CAPILLARY BLOOD GLUCOSE      POCT Blood Glucose.: 128 mg/dL (21 Feb 2020 11:46)  POCT Blood Glucose.: 120 mg/dL (21 Feb 2020 07:45)  POCT Blood Glucose.: 112 mg/dL (20 Feb 2020 21:39)  POCT Blood Glucose.: 108 mg/dL (20 Feb 2020 16:47)      Anion Gap, Serum: 14 (02-21 @ 07:22)  Anion Gap, Serum: 12 (02-20 @ 06:48)      Calcium, Total Serum: 9.8 (02-21 @ 07:22)  Calcium, Total Serum: 10.1 (02-20 @ 06:48)          02-21    142  |  99  |  21  ----------------------------<  123<H>  3.8   |  29  |  1.01    Ca    9.8      21 Feb 2020 07:22                          10.7   7.41  )-----------( 242      ( 21 Feb 2020 07:29 )             33.8     Medications  MEDICATIONS  (STANDING):  albuterol/ipratropium for Nebulization 3 milliLiter(s) Nebulizer every 8 hours  aspirin enteric coated 81 milliGRAM(s) Oral daily  buDESOnide    Inhalation Suspension 0.5 milliGRAM(s) Inhalation every 12 hours  metoprolol tartrate 25 milliGRAM(s) Oral every 8 hours  pantoprazole    Tablet 40 milliGRAM(s) Oral before breakfast  polyethylene glycol 3350 17 Gram(s) Oral daily  spironolactone 25 milliGRAM(s) Oral daily  warfarin 7.5 milliGRAM(s) Oral once      Physical Exam  General: Patient comfortable in bed  Vital Signs Last 12 Hrs  T(F): 98.7 (02-21-20 @ 05:10), Max: 98.7 (02-21-20 @ 05:10)  HR: 78 (02-21-20 @ 05:10) (78 - 78)  BP: 156/71 (02-21-20 @ 05:10) (156/71 - 156/71)  BP(mean): --  RR: 18 (02-21-20 @ 05:10) (18 - 18)  SpO2: 96% (02-21-20 @ 05:10) (96% - 96%)  Neck: No palpable thyroid nodules.  CVS: S1S2, No murmurs  Respiratory: No wheezing, no crepitations  GI: Abdomen soft, bowel sounds positive  Musculoskeletal:  edema lower extremities.   Skin: No skin rashes, no ecchymosis    Diagnostics Chief complaint  Patient is a 85y old  Female who presents with a chief complaint of Mitral stenosis (21 Feb 2020 12:08)   Review of systems  Patient in bed, looks comfortable, no  hypoglycemia.    Labs and Fingersticks  CAPILLARY BLOOD GLUCOSE      POCT Blood Glucose.: 128 mg/dL (21 Feb 2020 11:46)  POCT Blood Glucose.: 120 mg/dL (21 Feb 2020 07:45)  POCT Blood Glucose.: 112 mg/dL (20 Feb 2020 21:39)  POCT Blood Glucose.: 108 mg/dL (20 Feb 2020 16:47)      Anion Gap, Serum: 14 (02-21 @ 07:22)  Anion Gap, Serum: 12 (02-20 @ 06:48)      Calcium, Total Serum: 9.8 (02-21 @ 07:22)  Calcium, Total Serum: 10.1 (02-20 @ 06:48)          02-21    142  |  99  |  21  ----------------------------<  123<H>  3.8   |  29  |  1.01    Ca    9.8      21 Feb 2020 07:22                          10.7   7.41  )-----------( 242      ( 21 Feb 2020 07:29 )             33.8     Medications  MEDICATIONS  (STANDING):  albuterol/ipratropium for Nebulization 3 milliLiter(s) Nebulizer every 8 hours  aspirin enteric coated 81 milliGRAM(s) Oral daily  buDESOnide    Inhalation Suspension 0.5 milliGRAM(s) Inhalation every 12 hours  metoprolol tartrate 25 milliGRAM(s) Oral every 8 hours  pantoprazole    Tablet 40 milliGRAM(s) Oral before breakfast  polyethylene glycol 3350 17 Gram(s) Oral daily  spironolactone 25 milliGRAM(s) Oral daily  warfarin 7.5 milliGRAM(s) Oral once      Physical Exam  General: Patient comfortable in bed  Vital Signs Last 12 Hrs  T(F): 98.7 (02-21-20 @ 05:10), Max: 98.7 (02-21-20 @ 05:10)  HR: 78 (02-21-20 @ 05:10) (78 - 78)  BP: 156/71 (02-21-20 @ 05:10) (156/71 - 156/71)  BP(mean): --  RR: 18 (02-21-20 @ 05:10) (18 - 18)  SpO2: 96% (02-21-20 @ 05:10) (96% - 96%)  Neck: No palpable thyroid nodules.  CVS: S1S2, No murmurs  Respiratory: No wheezing, no crepitations  GI: Abdomen soft, bowel sounds positive  Musculoskeletal:  edema lower extremities.   Skin: No skin rashes, no ecchymosis    Diagnostics

## 2020-02-21 NOTE — PROGRESS NOTE ADULT - PROBLEM SELECTOR PLAN 3
Sliding scale adjusted  Endo consulted.
Suggest to continue medications, monitoring, FU primary team recommendations.
Sliding scale adjusted  Endo consulted.

## 2020-02-21 NOTE — PROGRESS NOTE ADULT - ASSESSMENT
Assessment  Hypoglycemia: 85y Female with pre-diabetes, A1C 6.4%, postop on low-scale insulin coverage, blood sugars improved, FS trending at target, no hypoglycemic episodes. Patient is eating meals, alert and comfortable, family at the bedside. Planning DC home tomorrow.  MVR: S/P surgery, on medications, no chest pain, stable, monitored.  HTN: Controlled,  on antihypertensive medications.          Ginna Jean MD  Cell: [phone]  Office: [phone] Assessment  Hypoglycemia: 85y Female with pre-diabetes, A1C 6.4%, postop on low-scale insulin coverage, blood sugars improved, FS trending at target, no hypoglycemic episodes.  Patient is eating meals, alert and comfortable, family at the bedside. Planning DC home tomorrow.  MVR: S/P surgery, on medications, no chest pain, stable, monitored.  HTN: Controlled,  on antihypertensive medications.          Ginna Jean MD  Cell: [phone]  Office: [phone]

## 2020-02-21 NOTE — PROGRESS NOTE ADULT - ATTENDING COMMENTS
Patient seen and exam today at bedside. Chart, labs, vitals, radiology reviewed. Above H&P reviewed and edited where appropriate.  Agree with history and physical exam. Agree with assessment and plan.

## 2020-02-21 NOTE — DISCHARGE NOTE NURSING/CASE MANAGEMENT/SOCIAL WORK - PATIENT PORTAL LINK FT
You can access the FollowMyHealth Patient Portal offered by [facility] by registering at the following website: [email or web address]. By joining [facility]’s FollowMyHealth portal, you will also be able to view your health information using other applications (apps) compatible with our system.

## 2020-02-22 ENCOUNTER — TRANSCRIPTION ENCOUNTER (OUTPATIENT)
Age: 85
End: 2020-02-22

## 2020-02-22 VITALS — SYSTOLIC BLOOD PRESSURE: 139 MMHG | HEART RATE: 75 BPM | DIASTOLIC BLOOD PRESSURE: 70 MMHG

## 2020-02-22 LAB
ANION GAP SERPL CALC-SCNC: 34 MMOL/L — HIGH (ref 5–17)
APTT BLD: 31.6 SEC — SIGNIFICANT CHANGE UP (ref 27.5–36.3)
BUN SERPL-MCNC: 23 MG/DL — SIGNIFICANT CHANGE UP (ref 7–23)
CALCIUM SERPL-MCNC: 9.5 MG/DL — SIGNIFICANT CHANGE UP (ref 8.4–10.5)
CHLORIDE SERPL-SCNC: 76 MMOL/L — LOW (ref 96–108)
CO2 SERPL-SCNC: 28 MMOL/L — SIGNIFICANT CHANGE UP (ref 22–31)
CREAT SERPL-MCNC: 0.99 MG/DL — SIGNIFICANT CHANGE UP (ref 0.5–1.3)
GLUCOSE SERPL-MCNC: 120 MG/DL — HIGH (ref 70–99)
HCT VFR BLD CALC: 29.9 % — LOW (ref 34.5–45)
HGB BLD-MCNC: 9.9 G/DL — LOW (ref 11.5–15.5)
INR BLD: 1.67 RATIO — HIGH (ref 0.88–1.16)
MCHC RBC-ENTMCNC: 30 PG — SIGNIFICANT CHANGE UP (ref 27–34)
MCHC RBC-ENTMCNC: 33.1 GM/DL — SIGNIFICANT CHANGE UP (ref 32–36)
MCV RBC AUTO: 90.6 FL — SIGNIFICANT CHANGE UP (ref 80–100)
NRBC # BLD: 0 /100 WBCS — SIGNIFICANT CHANGE UP (ref 0–0)
PLATELET # BLD AUTO: 237 K/UL — SIGNIFICANT CHANGE UP (ref 150–400)
POTASSIUM SERPL-MCNC: 5.4 MMOL/L — HIGH (ref 3.5–5.3)
POTASSIUM SERPL-SCNC: 5.4 MMOL/L — HIGH (ref 3.5–5.3)
PROTHROM AB SERPL-ACNC: 19.3 SEC — HIGH (ref 10–12.9)
RBC # BLD: 3.3 M/UL — LOW (ref 3.8–5.2)
RBC # FLD: 13.9 % — SIGNIFICANT CHANGE UP (ref 10.3–14.5)
SODIUM SERPL-SCNC: 138 MMOL/L — SIGNIFICANT CHANGE UP (ref 135–145)
WBC # BLD: 7.89 K/UL — SIGNIFICANT CHANGE UP (ref 3.8–10.5)
WBC # FLD AUTO: 7.89 K/UL — SIGNIFICANT CHANGE UP (ref 3.8–10.5)

## 2020-02-22 PROCEDURE — 82330 ASSAY OF CALCIUM: CPT

## 2020-02-22 PROCEDURE — P9017: CPT

## 2020-02-22 PROCEDURE — 84295 ASSAY OF SERUM SODIUM: CPT

## 2020-02-22 PROCEDURE — P9037: CPT

## 2020-02-22 PROCEDURE — C1769: CPT

## 2020-02-22 PROCEDURE — 85014 HEMATOCRIT: CPT

## 2020-02-22 PROCEDURE — 87086 URINE CULTURE/COLONY COUNT: CPT

## 2020-02-22 PROCEDURE — P9047: CPT

## 2020-02-22 PROCEDURE — 81001 URINALYSIS AUTO W/SCOPE: CPT

## 2020-02-22 PROCEDURE — 88305 TISSUE EXAM BY PATHOLOGIST: CPT

## 2020-02-22 PROCEDURE — 86022 PLATELET ANTIBODIES: CPT

## 2020-02-22 PROCEDURE — 80053 COMPREHEN METABOLIC PANEL: CPT

## 2020-02-22 PROCEDURE — P9059: CPT

## 2020-02-22 PROCEDURE — C1889: CPT

## 2020-02-22 PROCEDURE — 94660 CPAP INITIATION&MGMT: CPT

## 2020-02-22 PROCEDURE — 86900 BLOOD TYPING SEROLOGIC ABO: CPT

## 2020-02-22 PROCEDURE — 85027 COMPLETE CBC AUTOMATED: CPT

## 2020-02-22 PROCEDURE — 93005 ELECTROCARDIOGRAM TRACING: CPT

## 2020-02-22 PROCEDURE — 82565 ASSAY OF CREATININE: CPT

## 2020-02-22 PROCEDURE — 85396 CLOTTING ASSAY WHOLE BLOOD: CPT

## 2020-02-22 PROCEDURE — 83880 ASSAY OF NATRIURETIC PEPTIDE: CPT

## 2020-02-22 PROCEDURE — 97162 PT EVAL MOD COMPLEX 30 MIN: CPT

## 2020-02-22 PROCEDURE — 86923 COMPATIBILITY TEST ELECTRIC: CPT

## 2020-02-22 PROCEDURE — 97530 THERAPEUTIC ACTIVITIES: CPT

## 2020-02-22 PROCEDURE — 82962 GLUCOSE BLOOD TEST: CPT

## 2020-02-22 PROCEDURE — 86891 AUTOLOGOUS BLOOD OP SALVAGE: CPT

## 2020-02-22 PROCEDURE — 87640 STAPH A DNA AMP PROBE: CPT

## 2020-02-22 PROCEDURE — 93306 TTE W/DOPPLER COMPLETE: CPT

## 2020-02-22 PROCEDURE — 97116 GAIT TRAINING THERAPY: CPT

## 2020-02-22 PROCEDURE — 71045 X-RAY EXAM CHEST 1 VIEW: CPT

## 2020-02-22 PROCEDURE — P9045: CPT

## 2020-02-22 PROCEDURE — C1751: CPT

## 2020-02-22 PROCEDURE — 85384 FIBRINOGEN ACTIVITY: CPT

## 2020-02-22 PROCEDURE — 87070 CULTURE OTHR SPECIMN AEROBIC: CPT

## 2020-02-22 PROCEDURE — 36430 TRANSFUSION BLD/BLD COMPNT: CPT

## 2020-02-22 PROCEDURE — 82553 CREATINE MB FRACTION: CPT

## 2020-02-22 PROCEDURE — 84443 ASSAY THYROID STIM HORMONE: CPT

## 2020-02-22 PROCEDURE — 83605 ASSAY OF LACTIC ACID: CPT

## 2020-02-22 PROCEDURE — P9012: CPT

## 2020-02-22 PROCEDURE — 88311 DECALCIFY TISSUE: CPT

## 2020-02-22 PROCEDURE — 84484 ASSAY OF TROPONIN QUANT: CPT

## 2020-02-22 PROCEDURE — 85610 PROTHROMBIN TIME: CPT

## 2020-02-22 PROCEDURE — 83036 HEMOGLOBIN GLYCOSYLATED A1C: CPT

## 2020-02-22 PROCEDURE — P9016: CPT

## 2020-02-22 PROCEDURE — 86850 RBC ANTIBODY SCREEN: CPT

## 2020-02-22 PROCEDURE — 84100 ASSAY OF PHOSPHORUS: CPT

## 2020-02-22 PROCEDURE — 82803 BLOOD GASES ANY COMBINATION: CPT

## 2020-02-22 PROCEDURE — 87641 MR-STAPH DNA AMP PROBE: CPT

## 2020-02-22 PROCEDURE — 83735 ASSAY OF MAGNESIUM: CPT

## 2020-02-22 PROCEDURE — 94640 AIRWAY INHALATION TREATMENT: CPT

## 2020-02-22 PROCEDURE — 80048 BASIC METABOLIC PNL TOTAL CA: CPT

## 2020-02-22 PROCEDURE — 85730 THROMBOPLASTIN TIME PARTIAL: CPT

## 2020-02-22 PROCEDURE — 85049 AUTOMATED PLATELET COUNT: CPT

## 2020-02-22 PROCEDURE — 84132 ASSAY OF SERUM POTASSIUM: CPT

## 2020-02-22 PROCEDURE — 82435 ASSAY OF BLOOD CHLORIDE: CPT

## 2020-02-22 PROCEDURE — 82947 ASSAY GLUCOSE BLOOD QUANT: CPT

## 2020-02-22 PROCEDURE — 82550 ASSAY OF CK (CPK): CPT

## 2020-02-22 PROCEDURE — 81003 URINALYSIS AUTO W/O SCOPE: CPT

## 2020-02-22 PROCEDURE — 94002 VENT MGMT INPAT INIT DAY: CPT

## 2020-02-22 PROCEDURE — 86901 BLOOD TYPING SEROLOGIC RH(D): CPT

## 2020-02-22 RX ORDER — METOPROLOL TARTRATE 50 MG
2 TABLET ORAL
Qty: 60 | Refills: 0
Start: 2020-02-22 | End: 2020-03-22

## 2020-02-22 RX ORDER — ASCORBIC ACID 60 MG
1 TABLET,CHEWABLE ORAL
Qty: 0 | Refills: 0 | DISCHARGE

## 2020-02-22 RX ORDER — METOPROLOL TARTRATE 50 MG
1 TABLET ORAL
Qty: 0 | Refills: 0 | DISCHARGE

## 2020-02-22 RX ORDER — FUROSEMIDE 40 MG
0 TABLET ORAL
Qty: 0 | Refills: 0 | DISCHARGE

## 2020-02-22 RX ORDER — PANTOPRAZOLE SODIUM 20 MG/1
1 TABLET, DELAYED RELEASE ORAL
Qty: 30 | Refills: 0
Start: 2020-02-22 | End: 2020-03-22

## 2020-02-22 RX ORDER — WARFARIN SODIUM 2.5 MG/1
1 TABLET ORAL
Qty: 0 | Refills: 0 | DISCHARGE
Start: 2020-02-22

## 2020-02-22 RX ORDER — VITAMIN E 100 UNIT
1 CAPSULE ORAL
Qty: 0 | Refills: 0 | DISCHARGE

## 2020-02-22 RX ORDER — WARFARIN SODIUM 2.5 MG/1
1 TABLET ORAL
Qty: 0 | Refills: 0 | DISCHARGE

## 2020-02-22 RX ORDER — ASPIRIN/CALCIUM CARB/MAGNESIUM 324 MG
1 TABLET ORAL
Qty: 30 | Refills: 0
Start: 2020-02-22 | End: 2020-03-22

## 2020-02-22 RX ORDER — ATORVASTATIN CALCIUM 80 MG/1
0.5 TABLET, FILM COATED ORAL
Qty: 0 | Refills: 0 | DISCHARGE

## 2020-02-22 RX ADMIN — Medication 25 MILLIGRAM(S): at 05:55

## 2020-02-22 RX ADMIN — POLYETHYLENE GLYCOL 3350 17 GRAM(S): 17 POWDER, FOR SOLUTION ORAL at 11:27

## 2020-02-22 RX ADMIN — Medication 20 MILLIGRAM(S): at 05:55

## 2020-02-22 RX ADMIN — Medication 3 MILLILITER(S): at 05:55

## 2020-02-22 RX ADMIN — Medication 25 MILLIGRAM(S): at 12:46

## 2020-02-22 RX ADMIN — PANTOPRAZOLE SODIUM 40 MILLIGRAM(S): 20 TABLET, DELAYED RELEASE ORAL at 05:55

## 2020-02-22 RX ADMIN — Medication 0.5 MILLIGRAM(S): at 05:55

## 2020-02-22 RX ADMIN — Medication 81 MILLIGRAM(S): at 11:27

## 2020-02-22 RX ADMIN — SPIRONOLACTONE 25 MILLIGRAM(S): 25 TABLET, FILM COATED ORAL at 05:55

## 2020-02-22 NOTE — DISCHARGE NOTE PROVIDER - CARE PROVIDER_API CALL
Man Hall)  Surgery; Surgical Critical Care; Thoracic and Cardiac Surgery  [address] [place] [address]  Phone: [phone]  Fax: [phone]  Follow Up Time:     Antonino Israel)  Internal Medicine  [address] [place] [id number]  Phone: [phone]  Fax: [phone]  Follow Up Time:

## 2020-02-22 NOTE — DISCHARGE NOTE PROVIDER - NSDCPNSUBOBJ_GEN_ALL_CORE
Neurology: alert and oriented x 3, nonfocal, no gross deficits    CV : irregular     Sternal Wound :  CDI , Stable    Lungs: cta    Abdomen: soft, nontender, nondistended, positive bowel sounds, last bowel movement     +bm      Extremities:     no edema no calf tenderness

## 2020-02-22 NOTE — DISCHARGE NOTE PROVIDER - NSDCMRMEDTOKEN_GEN_ALL_CORE_FT
aspirin 81 mg oral delayed release tablet: 1 tab(s) orally once a day  atorvastatin 10 mg oral tablet: 0.5 tab(s) orally every other day (at bedtime)  Coumadin 5 mg oral tablet:   furosemide 20 mg oral tablet: 1 tab(s) orally once a day  metoprolol succinate 50 mg oral tablet, extended release: 2 tab(s) orally once a day (at bedtime)   pantoprazole 40 mg oral delayed release tablet: 1 tab(s) orally once a day (before a meal)  Vitamin C 500 mg oral tablet: 1 tab(s) orally once a day aspirin 81 mg oral delayed release tablet: 1 tab(s) orally once a day  atorvastatin 10 mg oral tablet: 0.5 tab(s) orally every other day (at bedtime)  Coumadin 5 mg oral tablet: 1 tab(s) orally once a day (at bedtime)  furosemide 20 mg oral tablet: 1 tab(s) orally once a day  metoprolol succinate 50 mg oral tablet, extended release: 2 tab(s) orally once a day (at bedtime)   pantoprazole 40 mg oral delayed release tablet: 1 tab(s) orally once a day (before a meal)  Vitamin C 500 mg oral tablet: 1 tab(s) orally once a day

## 2020-02-22 NOTE — DISCHARGE NOTE PROVIDER - NSDCCPCAREPLAN_GEN_ALL_CORE_FT
PRINCIPAL DISCHARGE DIAGNOSIS  Diagnosis: S/P mitral valve replacement  Assessment and Plan of Treatment: follow up appt. with Dr. Man Hall on Friday, February 28. -  call [phone] on Monday to schedule your appointment time  follow up appt with your Cardiologist and/or Primary Care MD in 2-3 weeks  weigh yourself daily prior to breakfast & record - bring record in to follow up visit.  shower daily- wash your incision with mild soap and water        SECONDARY DISCHARGE DIAGNOSES  Diagnosis: S/P TVR (tricuspid valve repair)  Assessment and Plan of Treatment: same as above

## 2020-02-22 NOTE — DISCHARGE NOTE PROVIDER - HOSPITAL COURSE
85F PMHx chronic afib ( last coumadin 3 days ago ), macular degeneration, HTN, HLD, glaucoma and mitral stenosis.  Patient presents with CASTREJON on exertion  TTE severe mitral .  Cardiac cath reviewed by Dr Hall.   OR am for MVR    s/p  2/12 ; Mitral valve replaced with a #31 Irene Mitral Valve; Tricuspid valve repair with #34 annuloplasty ring; open excision of left atrial appendage and closure via primary repair        2/14  on Milrinone infusion for inotropic support due acute to RV systolic dysfunction .    Norvasc initiated 2/13 and Vasotec started for hypertension.     In addition, patient had been started on Aldactone and Torsemide.    Hypotension- sommer- norvasc and vasotec d/c    TTE no effusion: < from: Transthoracic Echocardiogram (02.16.20 @ 12:20) >    1. Bioprosthetic mitral valve replacement. Minimal mitral    regurgitation. Mean transmitral valve gradient equals 6 mm    Hg, which is elevated in the presence of a prosthetic    valve.    2. Calcified trileaflet aortic valve with normal opening.    Minimal aortic regurgitation.    3. Severely dilated left atrium.  LA volume index = 106    cc/m2.    4. Eccentric left ventricular hypertrophy (dilated left    ventricle with normal relative wall thickness).    5. Normal left ventricular systolic function. No segmental    wall motion abnormalities. Septal motion consistent with    cardiac surgery.    6. Normal right ventricular size and function.    7. An annuloplasty ring is seen in the tricuspid position.    Mild tricuspid regurgitation.    8. Estimated pulmonary artery systolic pressure equals 57    mm Hg, assuming right atrial pressure equals 8 mm Hg,    consistent with moderate pulmonary pressures.    9. Left pleural effusion.    LLL opacity - on Zosyn, sono minimal fluid    Beta blocker  & Diuretics added.    2/19 VSS INR 1.54 negative -700 fluid balance US - L pigtail place - yielded approxiamately 400cc serous drainage.     2/20 VSS rounds made w/ Dr. Hall - will d/c L Pigtail catheter this am INR 1.34 will ck 2p INR-plan home Saturday.    2/21 feet elevated - aldactone added last evening - pt w/o edema this am - anticoagulate w/ Coumadin - d/c home saturday 2/22 - VSS - K+ 5.4 this am - aldactone discontinued - will d/c home on 5mg coumadin & lasix 20mg po daily x 5 days.    f/u w/ Dr. Hall on 2/28.

## 2020-02-22 NOTE — DISCHARGE NOTE PROVIDER - NSDCFUADDAPPT_GEN_ALL_CORE_FT
follow up appt with Dr. Man Hall on Friday, February 28 - call his office on Monday to set up your appointment time -   fyou will have your blood drawn on Tuesday to measure your Pt/INR - for your Coumadin dosing- DR. Antonino Israel will be managing your Coumadin dosing as before the surgery  follow up appt with your Cardiologist and/or Primary Care MD in 2-3 weeks
[place]

## 2020-02-23 RX ORDER — FUROSEMIDE 40 MG
1 TABLET ORAL
Qty: 5 | Refills: 0
Start: 2020-02-23 | End: 2020-02-27

## 2020-02-25 RX ORDER — ASPIRIN ENTERIC COATED TABLETS 81 MG 81 MG/1
81 TABLET, DELAYED RELEASE ORAL DAILY
Refills: 0 | Status: ACTIVE | COMMUNITY
Start: 2020-02-25

## 2020-02-25 RX ORDER — MUPIROCIN 20 MG/G
2 OINTMENT TOPICAL
Qty: 1 | Refills: 0 | Status: DISCONTINUED | COMMUNITY
Start: 2020-01-21 | End: 2020-02-25

## 2020-02-25 RX ORDER — PANTOPRAZOLE 40 MG/1
40 TABLET, DELAYED RELEASE ORAL DAILY
Refills: 0 | Status: ACTIVE | COMMUNITY
Start: 2020-02-25

## 2020-02-25 RX ORDER — DOXYCYCLINE HYCLATE 100 MG/1
100 CAPSULE ORAL
Qty: 28 | Refills: 0 | Status: DISCONTINUED | COMMUNITY
Start: 2020-01-21 | End: 2020-02-25

## 2020-02-25 RX ORDER — NICOTINE POLACRILEX 2 MG
1 GUM BUCCAL DAILY
Refills: 0 | Status: DISCONTINUED | COMMUNITY
Start: 2019-12-24 | End: 2020-02-25

## 2020-02-25 NOTE — PROGRESS NOTE ADULT - PROVIDER SPECIALTY LIST ADULT
Critical Care Patient's INR was 1.9 on 2/23/2020. Call to patient, no answer. Left message with INR result and asked for a return call before 3:00 pm today to discuss result further, clinic phone number provided. Advised to take 3 mg dose of warfarin tonight in case patient does not call back before clinic close.

## 2020-02-26 ENCOUNTER — APPOINTMENT (OUTPATIENT)
Dept: CARE COORDINATION | Facility: HOME HEALTH | Age: 85
End: 2020-02-26
Payer: MEDICARE

## 2020-02-26 VITALS
SYSTOLIC BLOOD PRESSURE: 132 MMHG | DIASTOLIC BLOOD PRESSURE: 84 MMHG | WEIGHT: 98 LBS | OXYGEN SATURATION: 98 % | BODY MASS INDEX: 19.14 KG/M2 | RESPIRATION RATE: 16 BRPM | HEART RATE: 64 BPM

## 2020-02-26 PROCEDURE — 99024 POSTOP FOLLOW-UP VISIT: CPT

## 2020-02-26 NOTE — PHYSICAL EXAM
[] : no respiratory distress [Respiration, Rhythm And Depth] : normal respiratory rhythm and effort [Auscultation Breath Sounds / Voice Sounds] : lungs were clear to auscultation bilaterally [Heart Rate And Rhythm] : heart rate was normal and rhythm regular [Heart Sounds] : normal S1 and S2 [FreeTextEntry1] : MSI, CT sites and SVG sites without erythema, drainage or warmth, with edges well approximated.  Sternum stable. BLE minimal edema [Abdomen Soft] : soft [Bowel Sounds] : normal bowel sounds [Abdomen Tenderness] : non-tender

## 2020-02-28 ENCOUNTER — APPOINTMENT (OUTPATIENT)
Dept: CARDIOTHORACIC SURGERY | Facility: CLINIC | Age: 85
End: 2020-02-28
Payer: MEDICARE

## 2020-02-28 VITALS
SYSTOLIC BLOOD PRESSURE: 156 MMHG | OXYGEN SATURATION: 98 % | RESPIRATION RATE: 12 BRPM | TEMPERATURE: 97.9 F | HEART RATE: 63 BPM | DIASTOLIC BLOOD PRESSURE: 74 MMHG

## 2020-02-28 VITALS — BODY MASS INDEX: 19.24 KG/M2 | WEIGHT: 98 LBS | HEIGHT: 60 IN

## 2020-02-28 PROCEDURE — 99024 POSTOP FOLLOW-UP VISIT: CPT

## 2020-02-28 RX ORDER — WARFARIN SODIUM 7.5 MG/1
7.5 TABLET ORAL
Refills: 0 | Status: ACTIVE | COMMUNITY
Start: 2020-02-28

## 2020-03-12 ENCOUNTER — APPOINTMENT (OUTPATIENT)
Dept: CARDIOLOGY | Facility: CLINIC | Age: 85
End: 2020-03-12
Payer: MEDICARE

## 2020-03-12 ENCOUNTER — NON-APPOINTMENT (OUTPATIENT)
Age: 85
End: 2020-03-12

## 2020-03-12 VITALS
WEIGHT: 100 LBS | HEIGHT: 60 IN | TEMPERATURE: 98.1 F | DIASTOLIC BLOOD PRESSURE: 86 MMHG | OXYGEN SATURATION: 100 % | SYSTOLIC BLOOD PRESSURE: 128 MMHG | RESPIRATION RATE: 17 BRPM | BODY MASS INDEX: 19.63 KG/M2 | HEART RATE: 74 BPM

## 2020-03-12 PROCEDURE — 93000 ELECTROCARDIOGRAM COMPLETE: CPT

## 2020-03-12 PROCEDURE — 99214 OFFICE O/P EST MOD 30 MIN: CPT

## 2020-03-16 LAB
ALBUMIN SERPL ELPH-MCNC: 4.3 G/DL
ALP BLD-CCNC: 83 U/L
ALT SERPL-CCNC: 17 U/L
ANION GAP SERPL CALC-SCNC: 13 MMOL/L
AST SERPL-CCNC: 21 U/L
BASOPHILS # BLD AUTO: 0.05 K/UL
BASOPHILS NFR BLD AUTO: 0.9 %
BILIRUB SERPL-MCNC: 0.5 MG/DL
BUN SERPL-MCNC: 23 MG/DL
CALCIUM SERPL-MCNC: 9.8 MG/DL
CHLORIDE SERPL-SCNC: 101 MMOL/L
CO2 SERPL-SCNC: 28 MMOL/L
CREAT SERPL-MCNC: 0.95 MG/DL
EOSINOPHIL # BLD AUTO: 0.39 K/UL
EOSINOPHIL NFR BLD AUTO: 7.4 %
GLUCOSE SERPL-MCNC: 89 MG/DL
HCT VFR BLD CALC: 35.4 %
HGB BLD-MCNC: 11.1 G/DL
IMM GRANULOCYTES NFR BLD AUTO: 0.4 %
LYMPHOCYTES # BLD AUTO: 0.88 K/UL
LYMPHOCYTES NFR BLD AUTO: 16.7 %
MAN DIFF?: NORMAL
MCHC RBC-ENTMCNC: 29.5 PG
MCHC RBC-ENTMCNC: 31.4 GM/DL
MCV RBC AUTO: 94.1 FL
MONOCYTES # BLD AUTO: 0.67 K/UL
MONOCYTES NFR BLD AUTO: 12.7 %
NEUTROPHILS # BLD AUTO: 3.27 K/UL
NEUTROPHILS NFR BLD AUTO: 61.9 %
NT-PROBNP SERPL-MCNC: 3603 PG/ML
PLATELET # BLD AUTO: 184 K/UL
POTASSIUM SERPL-SCNC: 4.4 MMOL/L
PROT SERPL-MCNC: 7 G/DL
RBC # BLD: 3.76 M/UL
RBC # FLD: 15.5 %
SODIUM SERPL-SCNC: 141 MMOL/L
WBC # FLD AUTO: 5.28 K/UL

## 2020-03-16 NOTE — REVIEW OF SYSTEMS
[Headache] : headache [see HPI] : see HPI [Negative] : Heme/Lymph [Shortness Of Breath] : no shortness of breath [Dyspnea on exertion] : not dyspnea during exertion [Chest Pain] : no chest pain [Lower Ext Edema] : no extremity edema [Palpitations] : no palpitations

## 2020-03-16 NOTE — REASON FOR VISIT
[FreeTextEntry1] : Mrs. Degroot returns today for followup of her atrial fibrillation and mitral stenosis.

## 2020-03-16 NOTE — DISCUSSION/SUMMARY
[FreeTextEntry1] : Mrs. Degroot is a 85-year-old woman s/p MVR, TV repair and chronic AF.\par Labs as above.\par Stress for cardiac rehab\par Echo for new valve monitoring\par Continue current dose of diuretic for the time being pending labs.\par see me min 3 weeks. \par \par after reviewing labs: Restart lasix 20 mg daily.

## 2020-03-16 NOTE — HISTORY OF PRESENT ILLNESS
[FreeTextEntry1] : Feb 12 MVR (bio), TV ring and HANANE clipping.\par INR managed by Dr. Israel.\par She reports taking aspirin now as well.\par She feels fine, but is concerned about the ankle swelling though it seems to be improving somewhat.\par She is accompanied by her .\par \par

## 2020-03-16 NOTE — PHYSICAL EXAM
[Normal Appearance] : normal appearance [General Appearance - In No Acute Distress] : no acute distress [Normal Conjunctiva] : the conjunctiva exhibited no abnormalities [Normal Oral Mucosa] : normal oral mucosa [Normal Jugular Venous V Waves Present] : normal jugular venous V waves present [Respiration, Rhythm And Depth] : normal respiratory rhythm and effort [Auscultation Breath Sounds / Voice Sounds] : lungs were clear to auscultation bilaterally [Heart Sounds] : normal S1 and S2 [Arterial Pulses Normal] : the arterial pulses were normal [Edema] : no peripheral edema present [Irregularly Irregular] : the rhythm was irregularly irregular [Bowel Sounds] : normal bowel sounds [Abdomen Soft] : soft [Abnormal Walk] : normal gait [Cyanosis, Localized] : no localized cyanosis [Petechial Hemorrhages (___cm)] : no petechial hemorrhages [] : no ischemic changes [Skin Turgor] : normal skin turgor [Oriented To Time, Place, And Person] : oriented to person, place, and time [Impaired Insight] : insight and judgment were intact [Affect] : the affect was normal [FreeTextEntry1] : a low pitched diastolic rumble is heard at the left apex.

## 2020-04-06 ENCOUNTER — APPOINTMENT (OUTPATIENT)
Dept: CARDIOLOGY | Facility: CLINIC | Age: 85
End: 2020-04-06

## 2020-04-06 ENCOUNTER — APPOINTMENT (OUTPATIENT)
Dept: CARDIOLOGY | Facility: CLINIC | Age: 85
End: 2020-04-06
Payer: MEDICARE

## 2020-04-06 LAB
ALBUMIN SERPL ELPH-MCNC: 4.3 G/DL
ALP BLD-CCNC: 76 U/L
ALT SERPL-CCNC: 17 U/L
ANION GAP SERPL CALC-SCNC: 12 MMOL/L
AST SERPL-CCNC: 23 U/L
BILIRUB SERPL-MCNC: 0.5 MG/DL
BUN SERPL-MCNC: 21 MG/DL
CALCIUM SERPL-MCNC: 9.8 MG/DL
CHLORIDE SERPL-SCNC: 99 MMOL/L
CO2 SERPL-SCNC: 30 MMOL/L
CREAT SERPL-MCNC: 0.99 MG/DL
GLUCOSE SERPL-MCNC: 100 MG/DL
NT-PROBNP SERPL-MCNC: 2094 PG/ML
POTASSIUM SERPL-SCNC: 4.7 MMOL/L
PROT SERPL-MCNC: 7.2 G/DL
SODIUM SERPL-SCNC: 141 MMOL/L

## 2020-04-06 PROCEDURE — 99213 OFFICE O/P EST LOW 20 MIN: CPT

## 2020-04-06 RX ORDER — DOXYCYCLINE HYCLATE 100 MG/1
100 CAPSULE ORAL
Qty: 14 | Refills: 0 | Status: ACTIVE | COMMUNITY
Start: 2020-04-06 | End: 1900-01-01

## 2020-04-06 NOTE — HISTORY OF PRESENT ILLNESS
[FreeTextEntry1] : VIDEO VISIT\par \par TeleHealth Video Encounter:\par  \par Initiated by:\par _x_Patient Call\par _x_Patient Election for TeleHealth visit\par  \par Consented for TeleHealth visit\par Patient Location:  Home\par Physician Location:\par _x_Office([address]; [address] [facility], [address], [place], [place], [address])\par __Home\par  \par Narrative:\par No fever or chills.\par no Cough.\par No dyspnea\par Ankle swelling better, much better.\par Taking her meds as directed. \par Reviewed labs with her. No blood sugar issues.\par Some reddness on the top of her incision site.\par \par Follow-up: 4 weeks.\par  \par  \par Duration of Encounter:  _15__ minutes, at least 50% of which was spent in direct counseling and coordination of care\par \par Prior:\par \par Feb 12 MVR (bio), TV ring and HANNAE clipping.\par INR managed by Dr. Israel.\par She reports taking aspirin now as well.\par She feels fine, but is concerned about the ankle swelling though it seems to be improving somewhat.\par She is accompanied by her .\par \par

## 2020-04-06 NOTE — DISCUSSION/SUMMARY
[FreeTextEntry1] : Mrs. Degroot is a 85-year-old woman s/p MVR, TV repair and chronic AF.\par Labs as above.\par Echo and Stress for cardiac rehab as been postponed due to COVID-19\par \par Continue current dose of diuretic for the time being given elevated PBNP.\par Phone visit in 3-4 weeks.

## 2020-05-06 ENCOUNTER — APPOINTMENT (OUTPATIENT)
Dept: CARDIOLOGY | Facility: CLINIC | Age: 85
End: 2020-05-06
Payer: MEDICARE

## 2020-05-06 PROCEDURE — 99213 OFFICE O/P EST LOW 20 MIN: CPT

## 2020-05-06 NOTE — HISTORY OF PRESENT ILLNESS
[FreeTextEntry1] : VIDEO VISIT\par \par TeleHealth Video Encounter:\par  \par Initiated by:\par x__Patient Call\par x__Patient Election for TeleHealth visit\par  \par Consented for TeleHealth visit\par Patient Location:  Home\par Physician Location:\par __Office([address]; [address] [facility] [place] [facility], [address], [place], [place], [address])\par _x_Home\par  \par Narrative:\par  taking meds as directed\par Taking lipitor qod, aspirin, \par doing a lot of walking outside. feels okay. No chest pain or dyspnea.\par 12 weeks postop.\par \par Assessment: Stable valvular hear dz s/p MVR/TR with chronic AF.\par Encouraged exercise \par INR monitored with Dr. Israel.\par Labs after May 11.\par monitor lipids too continue statin.\par Plan:\par  \par \par Follow-up:3 weeks\par  \par  \par Duration of Encounter:  _18__ minutes, at least 50% of which was spent in direct counseling and coordination of care\par \par \par (reminder to task Jodie)\par

## 2020-06-05 ENCOUNTER — APPOINTMENT (OUTPATIENT)
Dept: CARDIOLOGY | Facility: CLINIC | Age: 85
End: 2020-06-05

## 2020-06-05 ENCOUNTER — APPOINTMENT (OUTPATIENT)
Dept: CARDIOLOGY | Facility: CLINIC | Age: 85
End: 2020-06-05
Payer: MEDICARE

## 2020-06-05 ENCOUNTER — NON-APPOINTMENT (OUTPATIENT)
Age: 85
End: 2020-06-05

## 2020-06-05 VITALS
WEIGHT: 100 LBS | HEART RATE: 71 BPM | HEIGHT: 60 IN | SYSTOLIC BLOOD PRESSURE: 130 MMHG | TEMPERATURE: 97.3 F | DIASTOLIC BLOOD PRESSURE: 88 MMHG | RESPIRATION RATE: 17 BRPM | BODY MASS INDEX: 19.63 KG/M2 | OXYGEN SATURATION: 98 %

## 2020-06-05 LAB
ALBUMIN SERPL ELPH-MCNC: 4.4 G/DL
ALP BLD-CCNC: 64 U/L
ALT SERPL-CCNC: 29 U/L
ANION GAP SERPL CALC-SCNC: 14 MMOL/L
AST SERPL-CCNC: 31 U/L
BASOPHILS # BLD AUTO: 0.04 K/UL
BASOPHILS NFR BLD AUTO: 0.9 %
BILIRUB SERPL-MCNC: 0.5 MG/DL
BUN SERPL-MCNC: 26 MG/DL
CALCIUM SERPL-MCNC: 10 MG/DL
CHLORIDE SERPL-SCNC: 99 MMOL/L
CHOLEST SERPL-MCNC: 176 MG/DL
CHOLEST/HDLC SERPL: 2.7 RATIO
CO2 SERPL-SCNC: 28 MMOL/L
CREAT SERPL-MCNC: 0.99 MG/DL
EOSINOPHIL # BLD AUTO: 0.23 K/UL
EOSINOPHIL NFR BLD AUTO: 5.3 %
GLUCOSE SERPL-MCNC: 85 MG/DL
HCT VFR BLD CALC: 41.1 %
HDLC SERPL-MCNC: 65 MG/DL
HGB BLD-MCNC: 13.4 G/DL
IMM GRANULOCYTES NFR BLD AUTO: 0.2 %
LDLC SERPL CALC-MCNC: 94 MG/DL
LYMPHOCYTES # BLD AUTO: 1.15 K/UL
LYMPHOCYTES NFR BLD AUTO: 26.4 %
MAN DIFF?: NORMAL
MCHC RBC-ENTMCNC: 30.2 PG
MCHC RBC-ENTMCNC: 32.6 GM/DL
MCV RBC AUTO: 92.6 FL
MONOCYTES # BLD AUTO: 0.5 K/UL
MONOCYTES NFR BLD AUTO: 11.5 %
NEUTROPHILS # BLD AUTO: 2.42 K/UL
NEUTROPHILS NFR BLD AUTO: 55.7 %
NT-PROBNP SERPL-MCNC: 3072 PG/ML
PLATELET # BLD AUTO: 145 K/UL
POTASSIUM SERPL-SCNC: 5.5 MMOL/L
PROT SERPL-MCNC: 7.2 G/DL
RBC # BLD: 4.44 M/UL
RBC # FLD: 14 %
SODIUM SERPL-SCNC: 141 MMOL/L
TRIGL SERPL-MCNC: 86 MG/DL
WBC # FLD AUTO: 4.35 K/UL

## 2020-06-05 PROCEDURE — 93000 ELECTROCARDIOGRAM COMPLETE: CPT

## 2020-06-05 PROCEDURE — 99214 OFFICE O/P EST MOD 30 MIN: CPT

## 2020-06-05 NOTE — HISTORY OF PRESENT ILLNESS
[FreeTextEntry1] : She feels well and has been walking until she fractured a small bone in her foot.\par No chest pain and rare palpitations.\par No bleeding issues.\par \par Prior: Feb 12 MVR (bio), TV ring and HANANE clipping.\par INR managed by Dr. Israel.

## 2020-06-05 NOTE — DISCUSSION/SUMMARY
[FreeTextEntry1] : Mrs. Degroot is a 85-year-old woman s/p MVR, TV repair and chronic AF.\par She is doing well clinically.\par I have scheduled an echo to monitor the MVR/TV\par Her AF is asymptomatic and she is anticoagulated.\par No CHF on exam.\par rehab pushed off due to foot injury.\par borderline K. will stop banana.\par \par see me min 3 weeks. \par

## 2020-06-30 ENCOUNTER — APPOINTMENT (OUTPATIENT)
Dept: CARDIOLOGY | Facility: CLINIC | Age: 85
End: 2020-06-30
Payer: MEDICARE

## 2020-06-30 PROCEDURE — 93306 TTE W/DOPPLER COMPLETE: CPT

## 2020-07-06 ENCOUNTER — APPOINTMENT (OUTPATIENT)
Dept: CARDIOLOGY | Facility: CLINIC | Age: 85
End: 2020-07-06
Payer: MEDICARE

## 2020-07-06 ENCOUNTER — NON-APPOINTMENT (OUTPATIENT)
Age: 85
End: 2020-07-06

## 2020-07-06 VITALS
WEIGHT: 100 LBS | HEART RATE: 77 BPM | BODY MASS INDEX: 19.53 KG/M2 | DIASTOLIC BLOOD PRESSURE: 100 MMHG | SYSTOLIC BLOOD PRESSURE: 180 MMHG | OXYGEN SATURATION: 97 % | TEMPERATURE: 97.3 F

## 2020-07-06 PROCEDURE — 93000 ELECTROCARDIOGRAM COMPLETE: CPT

## 2020-07-06 PROCEDURE — 99214 OFFICE O/P EST MOD 30 MIN: CPT

## 2020-07-06 NOTE — REVIEW OF SYSTEMS
[Headache] : headache [see HPI] : see HPI [Negative] : Endocrine [Shortness Of Breath] : no shortness of breath [Dyspnea on exertion] : not dyspnea during exertion [Chest Pain] : no chest pain [Lower Ext Edema] : no extremity edema [Palpitations] : no palpitations

## 2020-07-06 NOTE — PHYSICAL EXAM
[Normal Appearance] : normal appearance [Normal Conjunctiva] : the conjunctiva exhibited no abnormalities [General Appearance - In No Acute Distress] : no acute distress [Normal Oral Mucosa] : normal oral mucosa [Normal Jugular Venous V Waves Present] : normal jugular venous V waves present [Auscultation Breath Sounds / Voice Sounds] : lungs were clear to auscultation bilaterally [Respiration, Rhythm And Depth] : normal respiratory rhythm and effort [Arterial Pulses Normal] : the arterial pulses were normal [Heart Sounds] : normal S1 and S2 [Edema] : no peripheral edema present [Irregularly Irregular] : the rhythm was irregularly irregular [Abnormal Walk] : normal gait [Bowel Sounds] : normal bowel sounds [Abdomen Soft] : soft [Cyanosis, Localized] : no localized cyanosis [Petechial Hemorrhages (___cm)] : no petechial hemorrhages [Oriented To Time, Place, And Person] : oriented to person, place, and time [] : no ischemic changes [Skin Turgor] : normal skin turgor [Impaired Insight] : insight and judgment were intact [Affect] : the affect was normal [FreeTextEntry1] : a low pitched diastolic rumble is heard at the left apex.

## 2020-07-06 NOTE — DISCUSSION/SUMMARY
[FreeTextEntry1] : Mrs. Degroot is a 85-year-old woman s/p MVR, TV repair and chronic AF.\par She is doing well clinically.\par Echo is okay.\par Her AF is asymptomatic and she is anticoagulated.\par No CHF on exam.\par BP not optimal. will add norvasc. Will try to taper lasix.\par rehab pushed off due to foot injury. \par Labs to be done at Dr. Israel. She will make an appointment.\par see me min 4 weeks. \par

## 2020-07-06 NOTE — HISTORY OF PRESENT ILLNESS
[FreeTextEntry1] : She continues to note decreased weight.\par Restarted walking now that her boot is off.\par No bleeding issues.\par Concerned about spider veings in her legs and reduced body weigth/bra size.\par \par Prior: Feb 12 MVR (bio), TV ring and HANANE clipping.\par INR managed by Dr. Israel.

## 2020-07-27 ENCOUNTER — APPOINTMENT (OUTPATIENT)
Dept: CARDIOLOGY | Facility: CLINIC | Age: 85
End: 2020-07-27
Payer: MEDICARE

## 2020-07-27 ENCOUNTER — APPOINTMENT (OUTPATIENT)
Dept: CARDIOLOGY | Facility: CLINIC | Age: 85
End: 2020-07-27

## 2020-07-27 VITALS
SYSTOLIC BLOOD PRESSURE: 178 MMHG | WEIGHT: 100 LBS | DIASTOLIC BLOOD PRESSURE: 86 MMHG | HEART RATE: 58 BPM | OXYGEN SATURATION: 95 % | TEMPERATURE: 98.2 F | BODY MASS INDEX: 19.63 KG/M2 | HEIGHT: 60 IN

## 2020-07-27 PROCEDURE — 99214 OFFICE O/P EST MOD 30 MIN: CPT

## 2020-07-27 PROCEDURE — 93000 ELECTROCARDIOGRAM COMPLETE: CPT

## 2020-07-27 NOTE — REVIEW OF SYSTEMS
[Headache] : headache [see HPI] : see HPI [Negative] : Heme/Lymph [Dyspnea on exertion] : not dyspnea during exertion [Shortness Of Breath] : no shortness of breath [Chest Pain] : no chest pain [Palpitations] : no palpitations [Lower Ext Edema] : no extremity edema

## 2020-07-27 NOTE — DISCUSSION/SUMMARY
[FreeTextEntry1] : Mrs. Degroot is a 85-year-old woman s/p MVR, TV repair and chronic AF.\par She is doing well clinically but her BP remains elevated.\par Echo is okay.\par Her AF is asymptomatic and she is anticoagulated.\par No CHF on exam.\par BP not optimal. will continue norvasc. and consider increasing Lasix.\par Would delay stress tesing for now.\par see me in 3 weeks.

## 2020-07-27 NOTE — PHYSICAL EXAM
[Normal Appearance] : normal appearance [General Appearance - In No Acute Distress] : no acute distress [Normal Conjunctiva] : the conjunctiva exhibited no abnormalities [Normal Oral Mucosa] : normal oral mucosa [Normal Jugular Venous V Waves Present] : normal jugular venous V waves present [Respiration, Rhythm And Depth] : normal respiratory rhythm and effort [Auscultation Breath Sounds / Voice Sounds] : lungs were clear to auscultation bilaterally [Arterial Pulses Normal] : the arterial pulses were normal [Heart Sounds] : normal S1 and S2 [Edema] : no peripheral edema present [Irregularly Irregular] : the rhythm was irregularly irregular [Bowel Sounds] : normal bowel sounds [Abnormal Walk] : normal gait [Abdomen Soft] : soft [Cyanosis, Localized] : no localized cyanosis [] : no ischemic changes [Petechial Hemorrhages (___cm)] : no petechial hemorrhages [Oriented To Time, Place, And Person] : oriented to person, place, and time [Skin Turgor] : normal skin turgor [Affect] : the affect was normal [Impaired Insight] : insight and judgment were intact [FreeTextEntry1] : a low pitched diastolic rumble is heard at the left apex.

## 2020-07-27 NOTE — HISTORY OF PRESENT ILLNESS
[FreeTextEntry1] : Stable weight\par Ankle swelling from norvasc.\par Dose reduced to 2.5 mg qd.\par Still walking about without difficulty.\par  \par No bleeding issues.\par  \par \par Prior: Feb 12 MVR (bio), TV ring and HANANE clipping.\par INR managed by Dr. Israel.

## 2020-07-29 ENCOUNTER — RX RENEWAL (OUTPATIENT)
Age: 85
End: 2020-07-29

## 2020-08-02 LAB
ANION GAP SERPL CALC-SCNC: 10 MMOL/L
BUN SERPL-MCNC: 26 MG/DL
CALCIUM SERPL-MCNC: 9.8 MG/DL
CHLORIDE SERPL-SCNC: 100 MMOL/L
CO2 SERPL-SCNC: 32 MMOL/L
CREAT SERPL-MCNC: 1.01 MG/DL
GLUCOSE SERPL-MCNC: 92 MG/DL
NT-PROBNP SERPL-MCNC: 1557 PG/ML
POTASSIUM SERPL-SCNC: 4.7 MMOL/L
SODIUM SERPL-SCNC: 142 MMOL/L

## 2020-08-25 ENCOUNTER — NON-APPOINTMENT (OUTPATIENT)
Age: 85
End: 2020-08-25

## 2020-08-25 ENCOUNTER — APPOINTMENT (OUTPATIENT)
Dept: CARDIOLOGY | Facility: CLINIC | Age: 85
End: 2020-08-25
Payer: MEDICARE

## 2020-08-25 VITALS
SYSTOLIC BLOOD PRESSURE: 140 MMHG | DIASTOLIC BLOOD PRESSURE: 90 MMHG | HEART RATE: 62 BPM | TEMPERATURE: 98 F | WEIGHT: 104 LBS | OXYGEN SATURATION: 96 % | BODY MASS INDEX: 20.31 KG/M2

## 2020-08-25 PROCEDURE — 99214 OFFICE O/P EST MOD 30 MIN: CPT

## 2020-08-25 NOTE — REVIEW OF SYSTEMS
[see HPI] : see HPI [Shortness Of Breath] : no shortness of breath [Headache] : headache [Chest Pain] : no chest pain [Dyspnea on exertion] : not dyspnea during exertion [Lower Ext Edema] : no extremity edema [Palpitations] : no palpitations [Negative] : Endocrine

## 2020-08-25 NOTE — DISCUSSION/SUMMARY
[FreeTextEntry1] : Mrs. Degroot is a 85-year-old woman s/p MVR, TV repair and chronic AF.\par Her BP is improving but not optimal.  Would uptitrate to norvasc 5 alternating with 2.5.\par Her AF is asymptomatic and she is anticoagulated.\par No CHF on exam.\par  \par see me in 3 weeks.

## 2020-08-25 NOTE — PHYSICAL EXAM
[Normal Appearance] : normal appearance [General Appearance - In No Acute Distress] : no acute distress [Normal Oral Mucosa] : normal oral mucosa [Normal Conjunctiva] : the conjunctiva exhibited no abnormalities [Normal Jugular Venous V Waves Present] : normal jugular venous V waves present [Respiration, Rhythm And Depth] : normal respiratory rhythm and effort [Auscultation Breath Sounds / Voice Sounds] : lungs were clear to auscultation bilaterally [Heart Sounds] : normal S1 and S2 [Arterial Pulses Normal] : the arterial pulses were normal [Edema] : no peripheral edema present [FreeTextEntry1] : a low pitched diastolic rumble is heard at the left apex. [Irregularly Irregular] : the rhythm was irregularly irregular [Bowel Sounds] : normal bowel sounds [Abdomen Soft] : soft [Abnormal Walk] : normal gait [] : no ischemic changes [Petechial Hemorrhages (___cm)] : no petechial hemorrhages [Cyanosis, Localized] : no localized cyanosis [Oriented To Time, Place, And Person] : oriented to person, place, and time [Skin Turgor] : normal skin turgor [Affect] : the affect was normal [Impaired Insight] : insight and judgment were intact

## 2020-09-04 ENCOUNTER — RX RENEWAL (OUTPATIENT)
Age: 85
End: 2020-09-04

## 2020-09-24 ENCOUNTER — NON-APPOINTMENT (OUTPATIENT)
Age: 85
End: 2020-09-24

## 2020-09-24 ENCOUNTER — APPOINTMENT (OUTPATIENT)
Dept: OPHTHALMOLOGY | Facility: CLINIC | Age: 85
End: 2020-09-24
Payer: MEDICARE

## 2020-09-24 ENCOUNTER — APPOINTMENT (OUTPATIENT)
Dept: CARDIOLOGY | Facility: CLINIC | Age: 85
End: 2020-09-24
Payer: MEDICARE

## 2020-09-24 VITALS
RESPIRATION RATE: 17 BRPM | DIASTOLIC BLOOD PRESSURE: 82 MMHG | TEMPERATURE: 97.3 F | OXYGEN SATURATION: 98 % | WEIGHT: 103 LBS | SYSTOLIC BLOOD PRESSURE: 124 MMHG | HEIGHT: 60 IN | HEART RATE: 60 BPM | BODY MASS INDEX: 20.22 KG/M2

## 2020-09-24 PROCEDURE — 93000 ELECTROCARDIOGRAM COMPLETE: CPT

## 2020-09-24 PROCEDURE — 99214 OFFICE O/P EST MOD 30 MIN: CPT

## 2020-09-24 PROCEDURE — 92014 COMPRE OPH EXAM EST PT 1/>: CPT

## 2020-09-24 PROCEDURE — 92083 EXTENDED VISUAL FIELD XM: CPT

## 2020-09-24 PROCEDURE — 92133 CPTRZD OPH DX IMG PST SGM ON: CPT

## 2020-09-24 PROCEDURE — 92020 GONIOSCOPY: CPT

## 2020-09-24 NOTE — REVIEW OF SYSTEMS
[Headache] : headache [see HPI] : see HPI [Shortness Of Breath] : no shortness of breath [Dyspnea on exertion] : not dyspnea during exertion [Chest Pain] : no chest pain [Lower Ext Edema] : no extremity edema [Palpitations] : no palpitations [Negative] : Heme/Lymph

## 2020-09-24 NOTE — PHYSICAL EXAM
[Normal Appearance] : normal appearance [General Appearance - In No Acute Distress] : no acute distress [Normal Conjunctiva] : the conjunctiva exhibited no abnormalities [Normal Oral Mucosa] : normal oral mucosa [Normal Jugular Venous V Waves Present] : normal jugular venous V waves present [Respiration, Rhythm And Depth] : normal respiratory rhythm and effort [Auscultation Breath Sounds / Voice Sounds] : lungs were clear to auscultation bilaterally [Heart Sounds] : normal S1 and S2 [Arterial Pulses Normal] : the arterial pulses were normal [Edema] : no peripheral edema present [Irregularly Irregular] : the rhythm was irregularly irregular [FreeTextEntry1] : a low pitched diastolic rumble is heard at the left apex. [Bowel Sounds] : normal bowel sounds [Abdomen Soft] : soft [Abnormal Walk] : normal gait [Cyanosis, Localized] : no localized cyanosis [Petechial Hemorrhages (___cm)] : no petechial hemorrhages [] : no ischemic changes [Skin Turgor] : normal skin turgor [Oriented To Time, Place, And Person] : oriented to person, place, and time [Impaired Insight] : insight and judgment were intact [Affect] : the affect was normal

## 2020-09-24 NOTE — HISTORY OF PRESENT ILLNESS
[FreeTextEntry1] : She feels okay.\par Taking 5mg/2.5 mg of norvasc alternating.\par Still walking about without difficulty and feels great.\par No bleeding issues.\par  \par \par Prior: \par Feb 12 MVR (bio), TV ring and HANANE clipping.\par INR managed by Dr. Israel.

## 2020-09-24 NOTE — DISCUSSION/SUMMARY
[FreeTextEntry1] : Mrs. Degroot is a 85-year-old woman s/p MVR, TV repair and chronic AF.\par Her BP is improving and is now optimal.  \par Would uptitrate to norvasc 5 alternating with 2.5.\par Her AF is asymptomatic and she is anticoagulated.\par No CHF on exam.\par \par see me in 8 weeks.

## 2020-11-19 ENCOUNTER — APPOINTMENT (OUTPATIENT)
Dept: CARDIOLOGY | Facility: CLINIC | Age: 85
End: 2020-11-19
Payer: MEDICARE

## 2020-11-19 ENCOUNTER — NON-APPOINTMENT (OUTPATIENT)
Age: 85
End: 2020-11-19

## 2020-11-19 VITALS
HEART RATE: 58 BPM | DIASTOLIC BLOOD PRESSURE: 78 MMHG | BODY MASS INDEX: 20.03 KG/M2 | OXYGEN SATURATION: 99 % | SYSTOLIC BLOOD PRESSURE: 140 MMHG | WEIGHT: 102 LBS | HEIGHT: 60 IN

## 2020-11-19 PROCEDURE — 93000 ELECTROCARDIOGRAM COMPLETE: CPT

## 2020-11-19 PROCEDURE — 99214 OFFICE O/P EST MOD 30 MIN: CPT

## 2020-11-19 NOTE — DISCUSSION/SUMMARY
[FreeTextEntry1] : Mrs. Degroot is a 85-year-old woman s/p MVR, TV repair and chronic AF.\par Her BP is improving and is now optimal.  \par Would continue titrate to norvasc 5 alternating with 2.5.\par Toprol 75 mg daily.\par Her AF is asymptomatic and she is anticoagulated.\par No CHF on exam.\par \par see me in 4 Months.

## 2020-11-19 NOTE — HISTORY OF PRESENT ILLNESS
[FreeTextEntry1] : She feels okay. No \par Taking 5mg/2.5 mg of norvasc alternating.\par Still walking about without difficulty and feels great. No chest pain.\par No bleeding issues.\par  \par \par Prior: \par Feb 12 MVR (bio), TV ring and HANANE clipping.\par INR managed by Dr. Israel.

## 2020-11-29 ENCOUNTER — RX RENEWAL (OUTPATIENT)
Age: 85
End: 2020-11-29

## 2020-12-15 ENCOUNTER — APPOINTMENT (OUTPATIENT)
Dept: CARDIOLOGY | Facility: CLINIC | Age: 85
End: 2020-12-15
Payer: MEDICARE

## 2020-12-15 ENCOUNTER — NON-APPOINTMENT (OUTPATIENT)
Age: 85
End: 2020-12-15

## 2020-12-15 VITALS
BODY MASS INDEX: 20.31 KG/M2 | WEIGHT: 104 LBS | HEART RATE: 66 BPM | DIASTOLIC BLOOD PRESSURE: 82 MMHG | OXYGEN SATURATION: 96 % | TEMPERATURE: 97.2 F | SYSTOLIC BLOOD PRESSURE: 158 MMHG

## 2020-12-15 PROCEDURE — 93000 ELECTROCARDIOGRAM COMPLETE: CPT

## 2020-12-15 PROCEDURE — 99214 OFFICE O/P EST MOD 30 MIN: CPT

## 2020-12-15 NOTE — HISTORY OF PRESENT ILLNESS
[FreeTextEntry1] : She feels that she has palpitaions when she is lying down. Stronger than usual. Not faster.\par Taking 5mg/2.5 mg of norvasc alternating daily.\par Eating chocolate.\par Still walking about without difficulty and feels great. No chest pain.\par No bleeding issues.\par  \par \par Prior: \par Feb 12 MVR (bio), TV ring and HANANE clipping.\par INR managed by Dr. Israel.

## 2020-12-15 NOTE — DISCUSSION/SUMMARY
[FreeTextEntry1] : Mrs. Degroot is a 85-year-old woman s/p MVR, TV repair and chronic AF.\par Her BP is improving and is now a bit low. No CHF on exam.\par Would continue titrate to norvasc 5 alternating with 2.5.\par Her paplpitations may be due to dehydration and low heart beat. Will reduce Toprol to 50 mg.\par increase fluids.\par Tolerating a/c for stroke risk reduction.\par \par see me in 4 Months.

## 2020-12-29 NOTE — PHYSICAL THERAPY INITIAL EVALUATION ADULT - FOLLOWS COMMANDS/ANSWERS QUESTIONS, REHAB EVAL
100% of the time
[facility]  Hematology/Oncology  [address] [id number]  Phone: [phone]  Fax:   Follow Up Time: 1 week

## 2021-01-04 NOTE — PROGRESS NOTE ADULT - SUBJECTIVE AND OBJECTIVE BOX
Patient stating he is in a lot of back pain. He had surgery on the 28th of December. Call sent to triage.   Chief complaint  Patient is a 85y old  Female who presents with a chief complaint of Mitral stenosis (19 Feb 2020 10:24)   Review of systems  Patient in bed, on breathing tx, looks comfortable, no hypoglycemia.    Labs and Fingersticks  CAPILLARY BLOOD GLUCOSE      POCT Blood Glucose.: 88 mg/dL (19 Feb 2020 11:33)  POCT Blood Glucose.: 107 mg/dL (19 Feb 2020 07:26)  POCT Blood Glucose.: 133 mg/dL (18 Feb 2020 21:34)  POCT Blood Glucose.: 151 mg/dL (18 Feb 2020 16:56)      Anion Gap, Serum: 13 (02-19 @ 06:51)  Anion Gap, Serum: 10 (02-18 @ 07:03)      Calcium, Total Serum: 9.7 (02-19 @ 06:51)  Calcium, Total Serum: 9.5 (02-18 @ 07:03)  Albumin, Serum: 3.6 (02-19 @ 06:51)  Albumin, Serum: 3.2 <L> (02-18 @ 07:03)    Alanine Aminotransferase (ALT/SGPT): 14 (02-19 @ 06:51)  Alanine Aminotransferase (ALT/SGPT): 17 (02-18 @ 07:03)  Alkaline Phosphatase, Serum: 48 (02-19 @ 06:51)  Alkaline Phosphatase, Serum: 46 (02-18 @ 07:03)  Aspartate Aminotransferase (AST/SGOT): 17 (02-19 @ 06:51)  Aspartate Aminotransferase (AST/SGOT): 18 (02-18 @ 07:03)        02-19    139  |  97  |  18  ----------------------------<  125<H>  4.0   |  29  |  1.05    Ca    9.7      19 Feb 2020 06:51  Phos  3.1     02-18  Mg     2.1     02-19    TPro  6.9  /  Alb  3.6  /  TBili  0.7  /  DBili  x   /  AST  17  /  ALT  14  /  AlkPhos  48  02-19                        10.5   7.78  )-----------( 162      ( 19 Feb 2020 06:51 )             32.9     Medications  MEDICATIONS  (STANDING):  albuterol/ipratropium for Nebulization 3 milliLiter(s) Nebulizer every 8 hours  aspirin enteric coated 81 milliGRAM(s) Oral daily  buDESOnide    Inhalation Suspension 0.5 milliGRAM(s) Inhalation every 12 hours  dextrose 5%. 1000 milliLiter(s) (50 mL/Hr) IV Continuous <Continuous>  dextrose 50% Injectable 12.5 Gram(s) IV Push once  dextrose 50% Injectable 25 Gram(s) IV Push once  dextrose 50% Injectable 25 Gram(s) IV Push once  furosemide    Tablet 40 milliGRAM(s) Oral daily  insulin lispro (HumaLOG) corrective regimen sliding scale   SubCutaneous three times a day before meals  insulin lispro (HumaLOG) corrective regimen sliding scale   SubCutaneous at bedtime  metoprolol tartrate 25 milliGRAM(s) Oral every 8 hours  pantoprazole    Tablet 40 milliGRAM(s) Oral before breakfast  piperacillin/tazobactam IVPB.. 3.375 Gram(s) IV Intermittent every 8 hours  polyethylene glycol 3350 17 Gram(s) Oral daily      Physical Exam  General: Patient comfortable in bed  Vital Signs Last 12 Hrs  T(F): 98.1 (02-19-20 @ 13:40), Max: 98.1 (02-19-20 @ 05:11)  HR: 98 (02-19-20 @ 13:40) (79 - 98)  BP: 123/62 (02-19-20 @ 13:40) (123/62 - 144/78)  BP(mean): --  RR: 18 (02-19-20 @ 13:40) (18 - 18)  SpO2: 95% (02-19-20 @ 13:40) (94% - 95%)  Neck: No palpable thyroid nodules.  CVS: S1S2, No murmurs  Respiratory: No wheezing, no crepitations  GI: Abdomen soft, bowel sounds positive  Musculoskeletal:  edema lower extremities.   Skin: No skin rashes, no ecchymosis    Diagnostics Chief complaint  Patient is a 85y old  Female who presents with a chief complaint of Mitral stenosis (19 Feb 2020 10:24)   Review of systems  Patient in bed, on breathing tx,  looks comfortable, no hypoglycemia.    Labs and Fingersticks  CAPILLARY BLOOD GLUCOSE      POCT Blood Glucose.: 88 mg/dL (19 Feb 2020 11:33)  POCT Blood Glucose.: 107 mg/dL (19 Feb 2020 07:26)  POCT Blood Glucose.: 133 mg/dL (18 Feb 2020 21:34)  POCT Blood Glucose.: 151 mg/dL (18 Feb 2020 16:56)      Anion Gap, Serum: 13 (02-19 @ 06:51)  Anion Gap, Serum: 10 (02-18 @ 07:03)      Calcium, Total Serum: 9.7 (02-19 @ 06:51)  Calcium, Total Serum: 9.5 (02-18 @ 07:03)  Albumin, Serum: 3.6 (02-19 @ 06:51)  Albumin, Serum: 3.2 <L> (02-18 @ 07:03)    Alanine Aminotransferase (ALT/SGPT): 14 (02-19 @ 06:51)  Alanine Aminotransferase (ALT/SGPT): 17 (02-18 @ 07:03)  Alkaline Phosphatase, Serum: 48 (02-19 @ 06:51)  Alkaline Phosphatase, Serum: 46 (02-18 @ 07:03)  Aspartate Aminotransferase (AST/SGOT): 17 (02-19 @ 06:51)  Aspartate Aminotransferase (AST/SGOT): 18 (02-18 @ 07:03)        02-19    139  |  97  |  18  ----------------------------<  125<H>  4.0   |  29  |  1.05    Ca    9.7      19 Feb 2020 06:51  Phos  3.1     02-18  Mg     2.1     02-19    TPro  6.9  /  Alb  3.6  /  TBili  0.7  /  DBili  x   /  AST  17  /  ALT  14  /  AlkPhos  48  02-19                        10.5   7.78  )-----------( 162      ( 19 Feb 2020 06:51 )             32.9     Medications  MEDICATIONS  (STANDING):  albuterol/ipratropium for Nebulization 3 milliLiter(s) Nebulizer every 8 hours  aspirin enteric coated 81 milliGRAM(s) Oral daily  buDESOnide    Inhalation Suspension 0.5 milliGRAM(s) Inhalation every 12 hours  dextrose 5%. 1000 milliLiter(s) (50 mL/Hr) IV Continuous <Continuous>  dextrose 50% Injectable 12.5 Gram(s) IV Push once  dextrose 50% Injectable 25 Gram(s) IV Push once  dextrose 50% Injectable 25 Gram(s) IV Push once  furosemide    Tablet 40 milliGRAM(s) Oral daily  insulin lispro (HumaLOG) corrective regimen sliding scale   SubCutaneous three times a day before meals  insulin lispro (HumaLOG) corrective regimen sliding scale   SubCutaneous at bedtime  metoprolol tartrate 25 milliGRAM(s) Oral every 8 hours  pantoprazole    Tablet 40 milliGRAM(s) Oral before breakfast  piperacillin/tazobactam IVPB.. 3.375 Gram(s) IV Intermittent every 8 hours  polyethylene glycol 3350 17 Gram(s) Oral daily      Physical Exam  General: Patient comfortable in bed  Vital Signs Last 12 Hrs  T(F): 98.1 (02-19-20 @ 13:40), Max: 98.1 (02-19-20 @ 05:11)  HR: 98 (02-19-20 @ 13:40) (79 - 98)  BP: 123/62 (02-19-20 @ 13:40) (123/62 - 144/78)  BP(mean): --  RR: 18 (02-19-20 @ 13:40) (18 - 18)  SpO2: 95% (02-19-20 @ 13:40) (94% - 95%)  Neck: No palpable thyroid nodules.  CVS: S1S2, No murmurs  Respiratory: No wheezing, no crepitations  GI: Abdomen soft, bowel sounds positive  Musculoskeletal:  edema lower extremities.   Skin: No skin rashes, no ecchymosis    Diagnostics

## 2021-01-20 ENCOUNTER — APPOINTMENT (OUTPATIENT)
Dept: CARDIOLOGY | Facility: CLINIC | Age: 86
End: 2021-01-20
Payer: MEDICARE

## 2021-01-20 ENCOUNTER — NON-APPOINTMENT (OUTPATIENT)
Age: 86
End: 2021-01-20

## 2021-01-20 VITALS
OXYGEN SATURATION: 98 % | BODY MASS INDEX: 20.31 KG/M2 | HEART RATE: 68 BPM | TEMPERATURE: 96.5 F | DIASTOLIC BLOOD PRESSURE: 80 MMHG | WEIGHT: 104 LBS | SYSTOLIC BLOOD PRESSURE: 130 MMHG

## 2021-01-20 VITALS — DIASTOLIC BLOOD PRESSURE: 80 MMHG | SYSTOLIC BLOOD PRESSURE: 128 MMHG

## 2021-01-20 PROCEDURE — 93000 ELECTROCARDIOGRAM COMPLETE: CPT

## 2021-01-20 PROCEDURE — 99214 OFFICE O/P EST MOD 30 MIN: CPT

## 2021-01-20 NOTE — DISCUSSION/SUMMARY
[FreeTextEntry1] : Mrs. Degroot is a 86-year-old woman s/p MVR, TV repair and chronic AF.\par Her BP is normal. No CHF on exam.\par Would continue titrate to norvasc 5 alternating with 2.5.\par Her palpitations are her sensing her AF. She was reassured.\par Continue Toprol to 50 mg.\par Tolerating a/c for stroke risk reduction.\par Can reduce lasix to qod.\par see me in 4 Months.

## 2021-01-20 NOTE — HISTORY OF PRESENT ILLNESS
[FreeTextEntry1] : She has been noting that her heart beats irregularly and she notices it.\par It is concerning, but no associated high risk symptoms.\par [place] or dyspnea.\par No bleeding issues.\par  \par \par Prior: \par Feb 12 MVR (bio), TV ring and HANANE clipping.\par INR managed by Dr. Israel.

## 2021-03-23 ENCOUNTER — NON-APPOINTMENT (OUTPATIENT)
Age: 86
End: 2021-03-23

## 2021-03-30 ENCOUNTER — APPOINTMENT (OUTPATIENT)
Dept: CARDIOLOGY | Facility: CLINIC | Age: 86
End: 2021-03-30
Payer: MEDICARE

## 2021-03-30 ENCOUNTER — RX RENEWAL (OUTPATIENT)
Age: 86
End: 2021-03-30

## 2021-03-30 PROCEDURE — 93306 TTE W/DOPPLER COMPLETE: CPT

## 2021-05-05 ENCOUNTER — RX RENEWAL (OUTPATIENT)
Age: 86
End: 2021-05-05

## 2021-06-23 ENCOUNTER — RX RENEWAL (OUTPATIENT)
Age: 86
End: 2021-06-23

## 2021-07-01 ENCOUNTER — RX RENEWAL (OUTPATIENT)
Age: 86
End: 2021-07-01

## 2021-07-01 ENCOUNTER — NON-APPOINTMENT (OUTPATIENT)
Age: 86
End: 2021-07-01

## 2021-07-01 ENCOUNTER — APPOINTMENT (OUTPATIENT)
Dept: OPHTHALMOLOGY | Facility: CLINIC | Age: 86
End: 2021-07-01
Payer: MEDICARE

## 2021-07-01 PROCEDURE — 92014 COMPRE OPH EXAM EST PT 1/>: CPT

## 2021-07-01 PROCEDURE — 92020 GONIOSCOPY: CPT

## 2021-07-01 PROCEDURE — 92250 FUNDUS PHOTOGRAPHY W/I&R: CPT

## 2021-07-01 PROCEDURE — 92083 EXTENDED VISUAL FIELD XM: CPT

## 2021-07-14 NOTE — HISTORY OF PRESENT ILLNESS
[FreeTextEntry1] : She feels okay.\par Taking 2.5 mg of norvasc.\par Still walking about without difficulty.\par No bleeding issues.\par Only 5mg lipitor daily.\par \par Prior: \par Feb 12 MVR (bio), TV ring and HANANE clipping.\par INR managed by Dr. Israel.  short term intact/long term intact

## 2021-10-04 ENCOUNTER — NON-APPOINTMENT (OUTPATIENT)
Age: 86
End: 2021-10-04

## 2021-10-04 ENCOUNTER — APPOINTMENT (OUTPATIENT)
Dept: CARDIOLOGY | Facility: CLINIC | Age: 86
End: 2021-10-04
Payer: MEDICARE

## 2021-10-04 VITALS
OXYGEN SATURATION: 98 % | BODY MASS INDEX: 20.9 KG/M2 | HEART RATE: 57 BPM | WEIGHT: 107 LBS | SYSTOLIC BLOOD PRESSURE: 152 MMHG | DIASTOLIC BLOOD PRESSURE: 88 MMHG

## 2021-10-04 DIAGNOSIS — Z79.01 LONG TERM (CURRENT) USE OF ANTICOAGULANTS: ICD-10-CM

## 2021-10-04 PROCEDURE — 93000 ELECTROCARDIOGRAM COMPLETE: CPT

## 2021-10-04 PROCEDURE — 99214 OFFICE O/P EST MOD 30 MIN: CPT

## 2021-10-04 NOTE — HISTORY OF PRESENT ILLNESS
[FreeTextEntry1] : She continues to have ankle edema. \par Left greater than right.\par Resolves in the AM.\par No bleeding. Some hemorrhoidal bleeding - not new.\par Negative leg doppler. 5.12.21\par Takes lasix qod due to increased urination.\par  \par \par Prior: \par Feb 12 MVR (bio), TV ring and HANANE clipping.\par INR managed by Dr. Israel.

## 2021-10-04 NOTE — DISCUSSION/SUMMARY
[FreeTextEntry1] : Mrs. Degroot is a 86-year-old woman s/p MVR, TV repair and chronic AF.\par Her BP is normal. No CHF on exam.\par Would continue norvasc 5 qd.\par Her palpitations are her sensing her AF. She was reassured and has gotten used to her irregular heart beat.\par Continue Toprol to 50 mg.\par Tolerating a/c for stroke risk reduction.\par Can increase lasix to qd.\par Leg cramps to eat more potassium.\par see me in 4-6 Months.

## 2021-10-08 ENCOUNTER — RX RENEWAL (OUTPATIENT)
Age: 86
End: 2021-10-08

## 2022-01-10 ENCOUNTER — APPOINTMENT (OUTPATIENT)
Dept: CARDIOLOGY | Facility: CLINIC | Age: 87
End: 2022-01-10

## 2022-03-08 ENCOUNTER — NON-APPOINTMENT (OUTPATIENT)
Age: 87
End: 2022-03-08

## 2022-03-08 ENCOUNTER — APPOINTMENT (RX ONLY)
Dept: URBAN - METROPOLITAN AREA CLINIC 123 | Facility: CLINIC | Age: 87
Setting detail: DERMATOLOGY
End: 2022-03-08

## 2022-03-08 DIAGNOSIS — D17 BENIGN LIPOMATOUS NEOPLASM: ICD-10-CM

## 2022-03-08 DIAGNOSIS — L01.01 NON-BULLOUS IMPETIGO: ICD-10-CM

## 2022-03-08 DIAGNOSIS — L57.0 ACTINIC KERATOSIS: ICD-10-CM

## 2022-03-08 DIAGNOSIS — D69.2 OTHER NONTHROMBOCYTOPENIC PURPURA: ICD-10-CM

## 2022-03-08 DIAGNOSIS — L82.1 OTHER SEBORRHEIC KERATOSIS: ICD-10-CM

## 2022-03-08 DIAGNOSIS — L81.5 LEUKODERMA, NOT ELSEWHERE CLASSIFIED: ICD-10-CM

## 2022-03-08 DIAGNOSIS — L90.5 SCAR CONDITIONS AND FIBROSIS OF SKIN: ICD-10-CM

## 2022-03-08 PROBLEM — D17.22 BENIGN LIPOMATOUS NEOPLASM OF SKIN AND SUBCUTANEOUS TISSUE OF LEFT ARM: Status: ACTIVE | Noted: 2022-03-08

## 2022-03-08 PROCEDURE — 99203 OFFICE O/P NEW LOW 30 MIN: CPT | Mod: 25

## 2022-03-08 PROCEDURE — ? DEFER

## 2022-03-08 PROCEDURE — ? COUNSELING

## 2022-03-08 PROCEDURE — ? PRESCRIPTION MEDICATION MANAGEMENT

## 2022-03-08 PROCEDURE — ? ADDITIONAL NOTES

## 2022-03-08 PROCEDURE — ? LIQUID NITROGEN

## 2022-03-08 PROCEDURE — 17003 DESTRUCT PREMALG LES 2-14: CPT

## 2022-03-08 PROCEDURE — 17000 DESTRUCT PREMALG LESION: CPT

## 2022-03-08 PROCEDURE — ? FULL BODY SKIN EXAM - DECLINED

## 2022-03-08 ASSESSMENT — LOCATION DETAILED DESCRIPTION DERM
LOCATION DETAILED: LEFT MEDIAL DISTAL PRETIBIAL REGION
LOCATION DETAILED: RIGHT SUPERIOR LATERAL BUCCAL CHEEK
LOCATION DETAILED: LEFT UPPER CUTANEOUS LIP
LOCATION DETAILED: LEFT PROXIMAL DORSAL FOREARM
LOCATION DETAILED: RIGHT PROXIMAL PRETIBIAL REGION
LOCATION DETAILED: LEFT DISTAL DORSAL FOREARM
LOCATION DETAILED: LEFT PROXIMAL PRETIBIAL REGION
LOCATION DETAILED: RIGHT MEDIAL DISTAL PRETIBIAL REGION
LOCATION DETAILED: RIGHT NARIS

## 2022-03-08 ASSESSMENT — LOCATION ZONE DERM
LOCATION ZONE: ARM
LOCATION ZONE: LEG
LOCATION ZONE: LIP
LOCATION ZONE: NOSE
LOCATION ZONE: FACE

## 2022-03-08 ASSESSMENT — LOCATION SIMPLE DESCRIPTION DERM
LOCATION SIMPLE: RIGHT PRETIBIAL REGION
LOCATION SIMPLE: LEFT PRETIBIAL REGION
LOCATION SIMPLE: RIGHT CHEEK
LOCATION SIMPLE: LEFT FOREARM
LOCATION SIMPLE: RIGHT NOSE
LOCATION SIMPLE: LEFT LIP

## 2022-03-08 NOTE — PROCEDURE: PRESCRIPTION MEDICATION MANAGEMENT
Detail Level: Zone
Continue Regimen: Mupirocin (from [facility])
Render In Strict Bullet Format?: No

## 2022-03-08 NOTE — PROCEDURE: ADDITIONAL NOTES
Detail Level: Simple
Additional Notes: Patient consent was obtained to proceed with the visit and recommended plan of care after discussion of all risks and benefits, including the risks of COVID-19 exposure.
Render Risk Assessment In Note?: no
Additional Notes: Pt claims h/o bx WNL

## 2022-03-08 NOTE — PROCEDURE: MIPS QUALITY
Quality 402: Tobacco Use And Help With Quitting Among Adolescents: Patient screened for tobacco and is an ex-smoker
Detail Level: Detailed
Quality 431: Preventive Care And Screening: Unhealthy Alcohol Use - Screening: Patient not identified as an unhealthy alcohol user when screened for unhealthy alcohol use using a systematic screening method
Quality 226: Preventive Care And Screening: Tobacco Use: Screening And Cessation Intervention: Patient screened for tobacco use and is an ex/non-smoker

## 2022-04-05 NOTE — H&P ADULT - NSHPSOURCEINFORD_GEN_ALL_CORE
CTS Consult    Patient name: Carlie Howe    Reason for consult: NSTEMI, CAD    Referring Physician: Dr. Maria A Nolasco, Dr. Vicky Wolf    Primary Care Physician: Meghann Lacey Tonny Smith MD        aspirin chewable tablet Patient/Chart(s) Shira Lara MD        Or    [facility]) [facility] [address]     [address] [place] [address] [employer] Kelsey Chamorro MD    Electronically signed by Kelsey Chamorro MD on 4/5/2022 at 10:44 AM

## 2022-05-18 ENCOUNTER — APPOINTMENT (OUTPATIENT)
Dept: OPHTHALMOLOGY | Facility: CLINIC | Age: 87
End: 2022-05-18

## 2022-05-25 ENCOUNTER — NON-APPOINTMENT (OUTPATIENT)
Age: 87
End: 2022-05-25

## 2022-05-25 ENCOUNTER — APPOINTMENT (OUTPATIENT)
Dept: CARDIOLOGY | Facility: CLINIC | Age: 87
End: 2022-05-25
Payer: MEDICARE

## 2022-05-25 VITALS
BODY MASS INDEX: 21.2 KG/M2 | OXYGEN SATURATION: 97 % | SYSTOLIC BLOOD PRESSURE: 138 MMHG | HEART RATE: 58 BPM | RESPIRATION RATE: 17 BRPM | WEIGHT: 108 LBS | HEIGHT: 60 IN | DIASTOLIC BLOOD PRESSURE: 86 MMHG

## 2022-05-25 DIAGNOSIS — R22.42 LOCALIZED SWELLING, MASS AND LUMP, LEFT LOWER LIMB: ICD-10-CM

## 2022-05-25 DIAGNOSIS — R06.00 DYSPNEA, UNSPECIFIED: ICD-10-CM

## 2022-05-25 PROCEDURE — 99214 OFFICE O/P EST MOD 30 MIN: CPT

## 2022-05-25 PROCEDURE — 93000 ELECTROCARDIOGRAM COMPLETE: CPT

## 2022-05-25 NOTE — PHYSICAL EXAM
[Normal Appearance] : normal appearance [General Appearance - In No Acute Distress] : no acute distress [Normal Conjunctiva] : the conjunctiva exhibited no abnormalities [Normal Oral Mucosa] : normal oral mucosa [Normal Jugular Venous V Waves Present] : normal jugular venous V waves present [Respiration, Rhythm And Depth] : normal respiratory rhythm and effort [Auscultation Breath Sounds / Voice Sounds] : lungs were clear to auscultation bilaterally [Heart Sounds] : normal S1 and S2 [Arterial Pulses Normal] : the arterial pulses were normal [Edema] : no peripheral edema present [Irregularly Irregular] : the rhythm was irregularly irregular [Bowel Sounds] : normal bowel sounds [Abdomen Soft] : soft [Abnormal Walk] : normal gait [Cyanosis, Localized] : no localized cyanosis [Petechial Hemorrhages (___cm)] : no petechial hemorrhages [] : no ischemic changes [Skin Turgor] : normal skin turgor [Oriented To Time, Place, And Person] : oriented to person, place, and time [Impaired Insight] : insight and judgment were intact [Affect] : the affect was normal

## 2022-06-01 ENCOUNTER — RX RENEWAL (OUTPATIENT)
Age: 87
End: 2022-06-01

## 2022-06-01 RX ORDER — AMOXICILLIN 500 MG/1
500 TABLET, FILM COATED ORAL
Qty: 8 | Refills: 8 | Status: ACTIVE | COMMUNITY
Start: 2020-11-25 | End: 1900-01-01

## 2022-06-02 LAB
ALBUMIN SERPL ELPH-MCNC: 4.7 G/DL
ALP BLD-CCNC: 66 U/L
ALT SERPL-CCNC: 21 U/L
ANION GAP SERPL CALC-SCNC: 15 MMOL/L
AST SERPL-CCNC: 24 U/L
BASOPHILS # BLD AUTO: 0.03 K/UL
BASOPHILS NFR BLD AUTO: 0.6 %
BILIRUB SERPL-MCNC: 0.3 MG/DL
BUN SERPL-MCNC: 23 MG/DL
CALCIUM SERPL-MCNC: 10.2 MG/DL
CHLORIDE SERPL-SCNC: 102 MMOL/L
CHOLEST SERPL-MCNC: 175 MG/DL
CO2 SERPL-SCNC: 25 MMOL/L
CREAT SERPL-MCNC: 0.96 MG/DL
EGFR: 57 ML/MIN/1.73M2
EOSINOPHIL # BLD AUTO: 0.18 K/UL
EOSINOPHIL NFR BLD AUTO: 3.3 %
ESTIMATED AVERAGE GLUCOSE: 128 MG/DL
GLUCOSE SERPL-MCNC: 60 MG/DL
HBA1C MFR BLD HPLC: 6.1 %
HCT VFR BLD CALC: 43.6 %
HDLC SERPL-MCNC: 69 MG/DL
HGB BLD-MCNC: 14.1 G/DL
IMM GRANULOCYTES NFR BLD AUTO: 0.4 %
INR PPP: 2.27 RATIO
LDLC SERPL CALC-MCNC: 97 MG/DL
LYMPHOCYTES # BLD AUTO: 1.57 K/UL
LYMPHOCYTES NFR BLD AUTO: 29 %
MAGNESIUM SERPL-MCNC: 2.2 MG/DL
MAN DIFF?: NORMAL
MCHC RBC-ENTMCNC: 30.7 PG
MCHC RBC-ENTMCNC: 32.3 GM/DL
MCV RBC AUTO: 94.8 FL
MONOCYTES # BLD AUTO: 0.57 K/UL
MONOCYTES NFR BLD AUTO: 10.5 %
NEUTROPHILS # BLD AUTO: 3.05 K/UL
NEUTROPHILS NFR BLD AUTO: 56.2 %
NONHDLC SERPL-MCNC: 106 MG/DL
NT-PROBNP SERPL-MCNC: 1157 PG/ML
PLATELET # BLD AUTO: 151 K/UL
POTASSIUM SERPL-SCNC: 5 MMOL/L
PROT SERPL-MCNC: 7.3 G/DL
PT BLD: 26.8 SEC
RBC # BLD: 4.6 M/UL
RBC # FLD: 13.2 %
SODIUM SERPL-SCNC: 142 MMOL/L
TRIGL SERPL-MCNC: 46 MG/DL
TSH SERPL-ACNC: 1.05 UIU/ML
WBC # FLD AUTO: 5.42 K/UL

## 2022-06-02 NOTE — DISCUSSION/SUMMARY
[FreeTextEntry1] : Mrs. Degroot is a 87-year-old woman s/p MVR, TV repair (HANANE closure) and chronic AF.\par Her BP is normal. No CHF on exam.\par HTN: Would continue norvasc 5 qd and Continue Toprol to 50 mg.\par AF: Tolerating a/c for stroke risk reduction. would consider replacing warfarin and aspirin with eliquis 2.5 mg bid (weight and age). Agree with Dr. Beverly.\par Leg edema: Takes lasix 10 mg daily. Takes magnesium for Leg cramps.\par Labs as above.\par see me in 4-6 Months.

## 2022-06-02 NOTE — REASON FOR VISIT
[FreeTextEntry1] : Mrs. Degroot returns today for followup of her atrial fibrillation and mitral replacement.

## 2022-06-02 NOTE — HISTORY OF PRESENT ILLNESS
[FreeTextEntry1] : 4 miles at least three days a week.\par No chest pains or dyspena.\par Very rare palpitations.\par Seen by Dr. Beverly for followup in [place]. Echo was unchanged from prior. He suggested stopping aspirin therapy and switching to eliquis.\par She continues to notice ankle edema. Worse at night. venous and arterial doppers were reportedly normal.\par \par \par Prior:\par She continues to have ankle edema. \par Left greater than right.\par Resolves in the AM.\par No bleeding. Some hemorrhoidal bleeding - not new.\par Negative leg doppler. 5.12.21\par Takes lasix qod due to increased urination.\par  \par \par Prior: \par Feb 12 MVR (bio), TV ring and HANANE clipping.\par INR managed by Dr. Israel.

## 2022-06-27 NOTE — PRE-OP CHECKLIST - BP NONINVASIVE DIASTOLIC (MM HG)
Detail Level: Detailed Skin Checks Recommendations: No evidence of recurrence. Include Location In Plan?: Yes Detail Level: Zone Hide Additional Notes?: No Detail Level: Generalized 89

## 2022-11-09 ENCOUNTER — RX RENEWAL (OUTPATIENT)
Age: 87
End: 2022-11-09

## 2022-11-17 ENCOUNTER — NON-APPOINTMENT (OUTPATIENT)
Age: 87
End: 2022-11-17

## 2022-11-17 ENCOUNTER — APPOINTMENT (OUTPATIENT)
Dept: CARDIOLOGY | Facility: CLINIC | Age: 87
End: 2022-11-17

## 2022-11-17 VITALS
BODY MASS INDEX: 20.9 KG/M2 | OXYGEN SATURATION: 99 % | WEIGHT: 107 LBS | SYSTOLIC BLOOD PRESSURE: 142 MMHG | DIASTOLIC BLOOD PRESSURE: 82 MMHG | HEART RATE: 57 BPM

## 2022-11-17 DIAGNOSIS — Z95.3 PRESENCE OF XENOGENIC HEART VALVE: ICD-10-CM

## 2022-11-17 DIAGNOSIS — Z98.890 OTHER SPECIFIED POSTPROCEDURAL STATES: ICD-10-CM

## 2022-11-17 PROCEDURE — 93000 ELECTROCARDIOGRAM COMPLETE: CPT

## 2022-11-17 PROCEDURE — 99214 OFFICE O/P EST MOD 30 MIN: CPT

## 2022-11-17 NOTE — HISTORY OF PRESENT ILLNESS
[FreeTextEntry1] : Feeling okay.\par She asked about switching to NOAC instead of coumadin. INR has been okay. No bleeding.\par Active, but not dedicated aerobic activity.\par Some left ankle swelling at the end of the day...\par \par Prior:\par 4 miles at least three days a week.\par No chest pains or dyspena.\par Very rare palpitations.\par Seen by Dr. Beverly for followup in [place]. Echo was unchanged from prior. He suggested stopping aspirin therapy and switching to eliquis.\par She continues to notice ankle edema. Worse at night. venous and arterial doppers were reportedly normal.\par \par \par Prior:\par She continues to have ankle edema. \par Left greater than right.\par Resolves in the AM.\par No bleeding. Some hemorrhoidal bleeding - not new.\par Negative leg doppler. 5.12.21\par Takes lasix qod due to increased urination.\par  \par \par Prior: \par Feb 12 MVR (bio), TV ring and HANANE clipping.\par INR managed by Dr. Israel.

## 2022-11-17 NOTE — DISCUSSION/SUMMARY
[FreeTextEntry1] : Mrs. Degroot is a 87-year-old woman s/p MVR, TV repair (HANANE closure) and chronic AF.\par Her BP is adequate No CHF on exam.\par HTN: Would continue norvasc 5 qd and Continue Toprol to 50 mg.\par AF: Tolerating a/c for stroke risk reduction. We discussed the risks and benefits of exchanging eliquis for warfarin. Still consider replacing warfarin and aspirin with eliquis 2.5 mg bid (weight and age).  \par Leg edema: Takes lasix 10 mg daily. Takes magnesium for Leg cramps.\par Echo next visit.\par see me in 4-6 Months. [EKG obtained to assist in diagnosis and management of assessed problem(s)] : EKG obtained to assist in diagnosis and management of assessed problem(s)

## 2023-03-01 ENCOUNTER — RX RENEWAL (OUTPATIENT)
Age: 88
End: 2023-03-01

## 2023-04-03 ENCOUNTER — RX RENEWAL (OUTPATIENT)
Age: 88
End: 2023-04-03

## 2023-04-19 ENCOUNTER — APPOINTMENT (OUTPATIENT)
Dept: CARDIOLOGY | Facility: CLINIC | Age: 88
End: 2023-04-19
Payer: MEDICARE

## 2023-04-19 PROCEDURE — 93306 TTE W/DOPPLER COMPLETE: CPT

## 2023-04-24 ENCOUNTER — TRANSCRIPTION ENCOUNTER (OUTPATIENT)
Age: 88
End: 2023-04-24

## 2023-07-06 ENCOUNTER — RX RENEWAL (OUTPATIENT)
Age: 88
End: 2023-07-06

## 2023-07-31 ENCOUNTER — RX RENEWAL (OUTPATIENT)
Age: 88
End: 2023-07-31

## 2023-07-31 RX ORDER — FUROSEMIDE 20 MG/1
20 TABLET ORAL DAILY
Qty: 90 | Refills: 2 | Status: ACTIVE | COMMUNITY
Start: 2019-12-24 | End: 1900-01-01

## 2023-08-30 ENCOUNTER — APPOINTMENT (OUTPATIENT)
Dept: OTOLARYNGOLOGY | Facility: CLINIC | Age: 88
End: 2023-08-30
Payer: MEDICARE

## 2023-08-30 VITALS
HEART RATE: 65 BPM | DIASTOLIC BLOOD PRESSURE: 85 MMHG | HEIGHT: 60 IN | BODY MASS INDEX: 20.81 KG/M2 | TEMPERATURE: 97.8 F | SYSTOLIC BLOOD PRESSURE: 166 MMHG | WEIGHT: 106 LBS

## 2023-08-30 DIAGNOSIS — H61.23 IMPACTED CERUMEN, BILATERAL: ICD-10-CM

## 2023-08-30 DIAGNOSIS — R42 DIZZINESS AND GIDDINESS: ICD-10-CM

## 2023-08-30 DIAGNOSIS — J34.89 OTHER SPECIFIED DISORDERS OF NOSE AND NASAL SINUSES: ICD-10-CM

## 2023-08-30 DIAGNOSIS — Z00.00 ENCOUNTER FOR GENERAL ADULT MEDICAL EXAMINATION W/OUT ABNORMAL FINDINGS: ICD-10-CM

## 2023-08-30 PROCEDURE — 99204 OFFICE O/P NEW MOD 45 MIN: CPT | Mod: 25

## 2023-08-30 PROCEDURE — 69210 REMOVE IMPACTED EAR WAX UNI: CPT

## 2023-08-31 PROBLEM — R42 DIZZY: Status: ACTIVE | Noted: 2023-08-31

## 2023-08-31 PROBLEM — H61.23 EXCESSIVE EAR WAX, BILATERAL: Status: ACTIVE | Noted: 2023-08-31

## 2023-08-31 NOTE — PROCEDURE
[FreeTextEntry3] : After informed verbal consent is obtained, cerumen is removed from the right and left ear canal with a curette and suction.

## 2023-08-31 NOTE — ASSESSMENT
[FreeTextEntry1] : Ms. LOPEZ 88 year F complains she had 1 episode of dizziness last month and since then she has been getting episodes of lightheadedness.   Vestibulopathy: - Rosalia's head exercise handout given - Reassured patient that it will resolve with time  Wax -Cerumen is removed from the right and left ear canal with a curette and suction.   f/u prn

## 2023-08-31 NOTE — PHYSICAL EXAM
[Hearing Loss Right Only] : normal [Hearing Loss Left Only] : normal [FreeTextEntry8] : wax [FreeTextEntry9] : wax [Midline] : trachea located in midline position [Normal] : no rashes

## 2023-08-31 NOTE — END OF VISIT
[FreeTextEntry3] : I personally saw and examined MAGGY LOPEZ in detail. I spoke to CHAGO Mobley regarding the assessment and plan of care. I reviewed the above assessment and plan of care, and agree. I have made changes in changes in the body of the note where appropriate.I personally reviewed the HPI, PMH, FH, SH, ROS and medications/allergies. I have spoken to CHAGO Mobley regarding the history and have personally determined the assessment and plan of care, and documented this myself. I was present and participated in all key portions of the encounter and all procedures noted above. I have made changes in the body of the note where appropriate.  Attesting Faculty: See Attending Signature Below    [Time Spent: ___ minutes] : I have spent [unfilled] minutes of time on the encounter.

## 2023-08-31 NOTE — HISTORY OF PRESENT ILLNESS
[de-identified] : 87 yo Patient complains she had several dizzy episodes last month. She went to visit her daughter in the [facility], suddenly she felt very dizzy. Lasted several hours, she went to the ED had a whole work up done. She was told that she has vertigo. Since then, she is feeling much better, however she does get episodes of lightheadedness. There is no change in hearing with dizziness. Pt has no ear pain, ear drainage, hearing loss, tinnitus, nasal congestion, nasal discharge, epistaxis, sinus infections, facial pain, facial pressure, throat pain, dysphagia or fevers

## 2023-10-05 ENCOUNTER — APPOINTMENT (OUTPATIENT)
Dept: CARDIOLOGY | Facility: CLINIC | Age: 88
End: 2023-10-05
Payer: MEDICARE

## 2023-10-05 ENCOUNTER — NON-APPOINTMENT (OUTPATIENT)
Age: 88
End: 2023-10-05

## 2023-10-05 VITALS
OXYGEN SATURATION: 100 % | BODY MASS INDEX: 21.48 KG/M2 | WEIGHT: 110 LBS | HEART RATE: 64 BPM | SYSTOLIC BLOOD PRESSURE: 130 MMHG | DIASTOLIC BLOOD PRESSURE: 72 MMHG

## 2023-10-05 DIAGNOSIS — I10 ESSENTIAL (PRIMARY) HYPERTENSION: ICD-10-CM

## 2023-10-05 DIAGNOSIS — R25.2 CRAMP AND SPASM: ICD-10-CM

## 2023-10-05 DIAGNOSIS — I48.91 UNSPECIFIED ATRIAL FIBRILLATION: ICD-10-CM

## 2023-10-05 PROCEDURE — 99214 OFFICE O/P EST MOD 30 MIN: CPT

## 2023-10-05 PROCEDURE — 93000 ELECTROCARDIOGRAM COMPLETE: CPT

## 2023-10-05 RX ORDER — AMLODIPINE BESYLATE 5 MG/1
5 TABLET ORAL DAILY
Qty: 135 | Refills: 3 | Status: ACTIVE | COMMUNITY
Start: 2020-07-06

## 2024-02-05 NOTE — REASON FOR VISIT
Discharge note    ADMISSION DATE:  8/13/2020  DATE:  8/16/2020  CURRENT HOSPITAL DAY:  Hospital Day: 4  ATTENDING PHYSICIAN:  Daryn Alfaro MD  CODE STATUS:  Full Resuscitation    SUBJECTIVE: Positive flatus.  Positive bowel movement.  Patient without complaints.    ACTIVE PROBLEMS:    There are no active hospital problems to display for this patient.          MEDICATIONS:    Current Facility-Administered Medications   Medication Dose Route Frequency Provider Last Rate Last Dose   • docusate sodium (COLACE) capsule 100 mg  100 mg Oral BID Daryn Alfaro MD   100 mg at 08/15/20 2008   • acetaminophen-codeine (TYLENOL NO.3) 300-30 MG per tablet 2 tablet  2 tablet Oral Q6H PRN Daryn Alfaro MD   2 tablet at 08/15/20 1546   • DULoxetine (CYMBALTA) capsule 30 mg  30 mg Oral Daily Daryn Alfaro MD   30 mg at 08/15/20 1057   • levothyroxine (SYNTHROID, LEVOTHROID) tablet 75 mcg  75 mcg Oral QAM AC Drayn Alfaro MD   75 mcg at 08/16/20 0615   • loratadine (CLARITIN) tablet 10 mg  10 mg Oral Daily Daryn Alfaro MD       • oxybutynin (DITROPAN XL) ER tablet 15 mg  15 mg Oral Nightly Daryn Alfaro MD   15 mg at 08/15/20 2008   • acetaminophen (TYLENOL) tablet 650 mg  650 mg Oral 6 times per day Daryn Alfaro MD   650 mg at 08/14/20 1334   • gabapentin (NEURONTIN) capsule 300 mg  300 mg Oral QAM Daryn Alfaro MD   300 mg at 08/16/20 0615   • gabapentin (NEURONTIN) capsule 600 mg  600 mg Oral Nightly Daryn Alfaro MD   600 mg at 08/15/20 2009   • sodium chloride 0.9 % flush bag 25 mL  25 mL Intravenous PRN Daryn Alfaro MD       • sodium chloride (PF) 0.9 % injection 2 mL  2 mL Intracatheter 2 times per day Daryn Alfaro MD   2 mL at 08/15/20 2011   • ondansetron (ZOFRAN ODT) disintegrating tablet 4 mg  4 mg Oral Q12H PRN Daryn Alfaro MD        Or   • ondansetron (ZOFRAN) injection 4 mg  4 mg Intravenous Q12H PRN Daryn Alfaro MD   4 mg at 08/13/20 2008   • HYDROmorphone (DILAUDID) injection 0.4 mg  0.4 mg  [facility]  Home Care Following Anesthesia  For 24 hours after surgery:  Get plenty of rest.  A responsible adult must stay with you for at least 24 hours after you leave the surgery center.  Do not drive or use heavy equipment.  If you have weakness or tingling, don't drive or use heavy equipment until this feeling goes away.   Do not drink alcohol.   Avoid strenuous or risky activities.  Ask for help when climbing stairs.  You may feel lightheaded.  IF so, sit for a few minutes before standing.  Have someone help you get up.   If you have nausea (feel sick to your stomach): Drink only clear liquids such as apple juice, ginger ale, broth or 7-Up.  Rest may also help.  Be sure to drink enough fluids.  Move to a regular diet as you feel able.   You may have a slight fever.  Call the doctor if your fever is over 100 F (37.7 C) (taken under the tongue) or lasts longer than 24 hours.  You may have a dry mouth, a sore throat, muscle aches or trouble sleeping. These should go away after 24 hours.  Do not make important or legal decisions.   It is recommended to avoid smoking.               Tips for taking pain medications  To get the best pain relief possible, remember these points:  Take pain medications as directed, before pain becomes severe.  Pain medication can upset your stomach: taking it with food may help.  Constipation is a common side effect of pain medication. Drink plenty of  fluids.  Eat foods high in fiber. Take a stool softener if recommended by your doctor or pharmacist.  Do not drink alcohol, drive or operate machinery while taking pain medications.  Ask about other ways to control pain, such as with heat, ice or relaxation.    Tylenol/Acetaminophen Consumption    If you feel your pain relief is insufficient, you may take Tylenol/Acetaminophen in addition to your narcotic pain medication.   Be careful not to exceed 4,000 mg of Tylenol/Acetaminophen in a 24 hour  Intravenous Q2H PRN Daryn Alfaro MD   0.4 mg at 08/14/20 0110   • ibuprofen (MOTRIN) tablet 600 mg  600 mg Oral 4x Daily Daryn Alfaro MD   600 mg at 08/15/20 2008             OBJECTIVE:    VITAL SIGNS:     Vital Last Value 24 Hour Range   Temperature 98.2 °F (36.8 °C) (08/16/20 0722) Temp  Min: 97.7 °F (36.5 °C)  Max: 98.2 °F (36.8 °C)   Pulse 72 (08/16/20 0722) Pulse  Min: 70  Max: 81   Respiratory 16 (08/16/20 0722) Resp  Min: 16  Max: 16   Non-Invasive  Blood Pressure 103/68 (08/16/20 0722) BP  Min: 93/61  Max: 109/73   Pulse Oximetry 99 % (08/16/20 0722) SpO2  Min: 98 %  Max: 100 %     Vital Today Admitted   Weight 74.2 kg (08/13/20 1235) Weight: 74.4 kg (08/12/20 1010)   Height N/A Height: 5' 3\" (160 cm) (08/12/20 1010)   BMI N/A BMI (Calculated): 29.05 (08/12/20 1010)     INTAKE/OUTPUT:      Intake/Output Summary (Last 24 hours) at 8/16/2020 0805  Last data filed at 8/16/2020 0500  Gross per 24 hour   Intake 1540 ml   Output --   Net 1540 ml         PHYSICAL EXAM: HEENT: Unremarkable.    Lungs: Clear    Heart: Positive S1-S2 no S3 or S4    Abdomen: Positive bowel sounds.  Soft and nontender.    Extremities: Negative Homans sign.    Incision: Clean, dry and intact.      LABORATORY DATA:             No results found for this or any previous visit (from the past 24 hour(s)).          ASSESSMENT/ PLAN:     Postop day #3.  Status post total abdominal hysterectomy.  Home today on Motrin and Tylenol 3.  Patient to follow-up in the office in 2 weeks.  Standard discharge instructions given.               period from all sources.  If you are taking extra strength Tylenol/acetaminophen (500 mg), the maximum dose is 8 tablets in 24 hours.  If you are taking regular strength acetaminophen (325 mg), the maximum dose is 12 tablets in 24 hours.  Tylenol 975mg last taken at 9:07am. Next available dose anytime after 3:07pm.     Call a doctor for any of the following:  Signs of infection (fever, growing tenderness at the surgery site, a large amount of drainage or bleeding, severe pain, foul-smelling drainage, redness, swelling).  It has been over 8 to 10 hours since surgery and you are still not able to urinate (pass water).  Headache for over 24 hours.  Numbness, tingling or weakness the day after surgery (if you had spinal anesthesia).  Signs of Covid-19 infection (temperature over 100 degrees, shortness of breath, cough, loss of taste/smell, generalized body aches, persistent headache, chills, sore throat, nausea/vomiting/diarrhea)  Your doctor is:       Dr. Deshawn Menezes, Ophthalmology: [phone]               Or dial [phone] and ask for the resident on call for:  Ophthalmology  For emergency care, call the:  [facility]:  [phone] (TTY for hearing impaired: [phone])                   [FreeTextEntry1] : Mrs. Degroot returns today for followup of her atrial fibrillation and mitral replacement.

## 2024-07-03 ENCOUNTER — APPOINTMENT (OUTPATIENT)
Dept: CARDIOLOGY | Facility: CLINIC | Age: 89
End: 2024-07-03

## 2024-07-11 ENCOUNTER — APPOINTMENT (OUTPATIENT)
Dept: CARDIOLOGY | Facility: CLINIC | Age: 89
End: 2024-07-11
Payer: MEDICARE

## 2024-07-11 ENCOUNTER — NON-APPOINTMENT (OUTPATIENT)
Age: 89
End: 2024-07-11

## 2024-07-11 VITALS
HEIGHT: 60 IN | OXYGEN SATURATION: 99 % | HEART RATE: 65 BPM | WEIGHT: 106 LBS | SYSTOLIC BLOOD PRESSURE: 176 MMHG | BODY MASS INDEX: 20.81 KG/M2 | DIASTOLIC BLOOD PRESSURE: 96 MMHG

## 2024-07-11 DIAGNOSIS — I10 ESSENTIAL (PRIMARY) HYPERTENSION: ICD-10-CM

## 2024-07-11 DIAGNOSIS — Z95.3 PRESENCE OF XENOGENIC HEART VALVE: ICD-10-CM

## 2024-07-11 DIAGNOSIS — I48.91 UNSPECIFIED ATRIAL FIBRILLATION: ICD-10-CM

## 2024-07-11 PROCEDURE — 93000 ELECTROCARDIOGRAM COMPLETE: CPT

## 2024-07-11 PROCEDURE — 99214 OFFICE O/P EST MOD 30 MIN: CPT

## 2024-07-11 PROCEDURE — G2211 COMPLEX E/M VISIT ADD ON: CPT

## 2024-07-11 RX ORDER — HYDROCHLOROTHIAZIDE 12.5 MG/1
12.5 TABLET ORAL DAILY
Qty: 90 | Refills: 2 | Status: ACTIVE | COMMUNITY
Start: 2024-07-11 | End: 1900-01-01

## 2024-08-01 ENCOUNTER — APPOINTMENT (OUTPATIENT)
Dept: CARDIOLOGY | Facility: CLINIC | Age: 89
End: 2024-08-01

## 2024-08-02 NOTE — PROCEDURE NOTE - CONNECTED TO SUCTION/CM H20
Pt c/o neck pain and bilat groin pain due to MS.  
-20
hand grasp, leg strength strong and equal bilaterally

## 2024-08-08 ENCOUNTER — APPOINTMENT (OUTPATIENT)
Dept: CARDIOLOGY | Facility: CLINIC | Age: 89
End: 2024-08-08

## 2024-08-08 PROCEDURE — 99214 OFFICE O/P EST MOD 30 MIN: CPT

## 2024-08-08 NOTE — HISTORY OF PRESENT ILLNESS
[FreeTextEntry1] : Anat returns today for follow up of her hypertension.  Warfarin 5 days a week (5 mg) 2 days a week (2.5). INR was 2.6. Some easy bruising.  She has not been checking her blood pressure at home.  Lower back pain in the morning, saw orthopedics. XRs showed scoliosis with arthritis. She did PT for a few weeks.  Otherwise feeling well.   Prior: She is doing well overall. Echo done 2/2024 sowed normal EF, LAE, bio MVR, moderate pulmonary HTN 50 mmHg. Back pain is more active. Some leg cramps. Leg u/s is normal.  Prior: Aside from night leg cramps she is feeling well. Didn't try mg supplement. Transient episode of dizziness. Worse when she lied down. Diagnosed with vertigo. Vestibular therapy was good. Amlodipine increased to 7.5 qd.  Prior: Feeling okay. She asked about switching to NOAC instead of coumadin. INR has been okay. No bleeding. Active, but not dedicated aerobic activity. Some left ankle swelling at the end of the day...  Prior: 4 miles at least three days a week. No chest pains or dyspena. Very rare palpitations. Seen by Dr. Beverly for followup in [place]. Echo was unchanged from prior. He suggested stopping aspirin therapy and switching to eliquis. She continues to notice ankle edema. Worse at night. venous and arterial doppers were reportedly normal.   Prior: She continues to have ankle edema.  Left greater than right. Resolves in the AM. No bleeding. Some hemorrhoidal bleeding - not new. Negative leg doppler. 5.12.21 Takes lasix qod due to increased urination.    Prior:  Feb 12 MVR (bio), TV ring and HANANE clipping. INR managed by Dr. Isarel.

## 2024-08-08 NOTE — DISCUSSION/SUMMARY
[FreeTextEntry1] : Mrs. Degroot is an 89-year-old woman s/p MVR, TV repair (HANANE closure) and chronic AF.  Her BP remains elevated. No CHF on exam. HTN: Continue Norvasc 7.5 daily and continue Toprol 25 mg. Increase HCTZ to 25 mg daily.  AF: Tolerating a/c for stroke risk reduction. We briefly again discussed the risks and benefits of exchanging Eliquis for warfarin. Still consider replacing warfarin and aspirin with Eliquis 2.5 mg bid (weight and age).   Leg edema: Resolved.  Echo next year, April 2025.   She will follow up in the office in 3 weeks with labs prior.

## 2024-08-08 NOTE — HISTORY OF PRESENT ILLNESS
[FreeTextEntry1] : Anat returns today for follow up of her hypertension.  Warfarin 5 days a week (5 mg) 2 days a week (2.5). INR was 2.6. Some easy bruising.  She has not been checking her blood pressure at home.  Lower back pain in the morning, saw orthopedics. XRs showed scoliosis with arthritis. She did PT for a few weeks.  Otherwise feeling well.   Prior: She is doing well overall. Echo done 2/2024 sowed normal EF, LAE, bio MVR, moderate pulmonary HTN 50 mmHg. Back pain is more active. Some leg cramps. Leg u/s is normal.  Prior: Aside from night leg cramps she is feeling well. Didn't try mg supplement. Transient episode of dizziness. Worse when she lied down. Diagnosed with vertigo. Vestibular therapy was good. Amlodipine increased to 7.5 qd.  Prior: Feeling okay. She asked about switching to NOAC instead of coumadin. INR has been okay. No bleeding. Active, but not dedicated aerobic activity. Some left ankle swelling at the end of the day...  Prior: 4 miles at least three days a week. No chest pains or dyspena. Very rare palpitations. Seen by Dr. Beverly for followup in [place]. Echo was unchanged from prior. He suggested stopping aspirin therapy and switching to eliquis. She continues to notice ankle edema. Worse at night. venous and arterial doppers were reportedly normal.   Prior: She continues to have ankle edema.  Left greater than right. Resolves in the AM. No bleeding. Some hemorrhoidal bleeding - not new. Negative leg doppler. 5.12.21 Takes lasix qod due to increased urination.    Prior:  Feb 12 MVR (bio), TV ring and HANANE clipping. INR managed by Dr. Isreal.

## 2024-08-09 ENCOUNTER — APPOINTMENT (OUTPATIENT)
Dept: CARDIOLOGY | Facility: CLINIC | Age: 89
End: 2024-08-09

## 2024-09-09 ENCOUNTER — APPOINTMENT (OUTPATIENT)
Dept: CARDIOLOGY | Facility: CLINIC | Age: 89
End: 2024-09-09
Payer: MEDICARE

## 2024-09-09 ENCOUNTER — NON-APPOINTMENT (OUTPATIENT)
Age: 89
End: 2024-09-09

## 2024-09-09 VITALS
WEIGHT: 104 LBS | OXYGEN SATURATION: 98 % | SYSTOLIC BLOOD PRESSURE: 162 MMHG | BODY MASS INDEX: 20.31 KG/M2 | DIASTOLIC BLOOD PRESSURE: 80 MMHG | HEART RATE: 54 BPM

## 2024-09-09 DIAGNOSIS — I48.91 UNSPECIFIED ATRIAL FIBRILLATION: ICD-10-CM

## 2024-09-09 DIAGNOSIS — Z79.01 LONG TERM (CURRENT) USE OF ANTICOAGULANTS: ICD-10-CM

## 2024-09-09 DIAGNOSIS — I10 ESSENTIAL (PRIMARY) HYPERTENSION: ICD-10-CM

## 2024-09-09 PROCEDURE — 93000 ELECTROCARDIOGRAM COMPLETE: CPT

## 2024-09-09 PROCEDURE — G2211 COMPLEX E/M VISIT ADD ON: CPT

## 2024-09-09 PROCEDURE — 99214 OFFICE O/P EST MOD 30 MIN: CPT

## 2024-09-09 NOTE — DISCUSSION/SUMMARY
[FreeTextEntry1] : Mrs. Degroot is an 89-year-old woman s/p MVR, TV repair (HANANE closure) and chronic AF. ECG: AF with moderate VR. Her measured BP is elevated, but likely related to stiff arteries. No CHF on exam. HTN: 150/120/70:  Continue Norvasc 7.5 daily and continue Toprol 25 mg. Increase HCTZ to 25 mg daily. No need for medication adjustment. AF: Tolerating a/c for stroke risk reduction. We briefly again discussed the risks and benefits of exchanging Eliquis for warfarin. Still consider replacing warfarin and aspirin with Eliquis 2.5 mg bid (weight and age).   Leg edema: Resolved.  Echo next year, April 2025.  See me in 4 months.   [EKG obtained to assist in diagnosis and management of assessed problem(s)] : EKG obtained to assist in diagnosis and management of assessed problem(s)

## 2024-09-09 NOTE — HISTORY OF PRESENT ILLNESS
[FreeTextEntry1] : Feels fine. Still taking all meds as directed. No bleeding aside from hemorrhoids. PT for scoliosis.   Prior: Anat returns today for follow up of her hypertension.  Warfarin 5 days a week (5 mg) 2 days a week (2.5). INR was 2.6. Some easy bruising.  She has not been checking her blood pressure at home.  Lower back pain in the morning, saw orthopedics. XRs showed scoliosis with arthritis. She did PT for a few weeks.  Otherwise feeling well.   Prior: She is doing well overall. Echo done 2/2024 sowed normal EF, LAE, bio MVR, moderate pulmonary HTN 50 mmHg. Back pain is more active. Some leg cramps. Leg u/s is normal.  Prior: Aside from night leg cramps she is feeling well. Didn't try mg supplement. Transient episode of dizziness. Worse when she lied down. Diagnosed with vertigo. Vestibular therapy was good. Amlodipine increased to 7.5 qd.  Prior: Feeling okay. She asked about switching to NOAC instead of coumadin. INR has been okay. No bleeding. Active, but not dedicated aerobic activity. Some left ankle swelling at the end of the day...  Prior: 4 miles at least three days a week. No chest pains or dyspena. Very rare palpitations. Seen by Dr. Beverly for followup in [place]. Echo was unchanged from prior. He suggested stopping aspirin therapy and switching to eliquis. She continues to notice ankle edema. Worse at night. venous and arterial doppers were reportedly normal.   Prior: She continues to have ankle edema.  Left greater than right. Resolves in the AM. No bleeding. Some hemorrhoidal bleeding - not new. Negative leg doppler. 5.12.21 Takes lasix qod due to increased urination.    Prior:  Feb 12 MVR (bio), TV ring and HANANE clipping. INR managed by Dr. Israel.

## 2025-04-04 DIAGNOSIS — Z95.3 PRESENCE OF XENOGENIC HEART VALVE: ICD-10-CM

## 2025-04-04 DIAGNOSIS — I05.0 RHEUMATIC MITRAL STENOSIS: ICD-10-CM

## 2025-05-19 ENCOUNTER — APPOINTMENT (OUTPATIENT)
Dept: CARDIOLOGY | Facility: CLINIC | Age: 89
End: 2025-05-19
Payer: MEDICARE

## 2025-05-19 ENCOUNTER — NON-APPOINTMENT (OUTPATIENT)
Age: 89
End: 2025-05-19

## 2025-05-19 VITALS — BODY MASS INDEX: 20.38 KG/M2 | OXYGEN SATURATION: 94 % | HEART RATE: 70 BPM | WEIGHT: 104.38 LBS

## 2025-05-19 VITALS — DIASTOLIC BLOOD PRESSURE: 80 MMHG | SYSTOLIC BLOOD PRESSURE: 140 MMHG

## 2025-05-19 DIAGNOSIS — I48.91 UNSPECIFIED ATRIAL FIBRILLATION: ICD-10-CM

## 2025-05-19 DIAGNOSIS — I10 ESSENTIAL (PRIMARY) HYPERTENSION: ICD-10-CM

## 2025-05-19 PROCEDURE — 99214 OFFICE O/P EST MOD 30 MIN: CPT

## 2025-05-19 PROCEDURE — 93000 ELECTROCARDIOGRAM COMPLETE: CPT

## 2025-05-19 PROCEDURE — 93306 TTE W/DOPPLER COMPLETE: CPT

## 2025-05-19 PROCEDURE — G2211 COMPLEX E/M VISIT ADD ON: CPT

## 2025-05-25 ENCOUNTER — NON-APPOINTMENT (OUTPATIENT)
Age: 89
End: 2025-05-25

## 2025-07-28 ENCOUNTER — APPOINTMENT (OUTPATIENT)
Dept: CARDIOLOGY | Facility: CLINIC | Age: 89
End: 2025-07-28
Payer: MEDICARE

## 2025-07-28 VITALS
OXYGEN SATURATION: 95 % | SYSTOLIC BLOOD PRESSURE: 140 MMHG | DIASTOLIC BLOOD PRESSURE: 80 MMHG | WEIGHT: 105 LBS | HEART RATE: 66 BPM | BODY MASS INDEX: 20.51 KG/M2

## 2025-07-28 DIAGNOSIS — Z98.890 OTHER SPECIFIED POSTPROCEDURAL STATES: ICD-10-CM

## 2025-07-28 PROCEDURE — 99214 OFFICE O/P EST MOD 30 MIN: CPT

## 2025-07-28 PROCEDURE — G2211 COMPLEX E/M VISIT ADD ON: CPT

## 2025-07-28 PROCEDURE — 93000 ELECTROCARDIOGRAM COMPLETE: CPT

## 2025-07-31 LAB
ALBUMIN SERPL ELPH-MCNC: 4.5 G/DL
ALP BLD-CCNC: 59 U/L
ALT SERPL-CCNC: 24 U/L
ANION GAP SERPL CALC-SCNC: 13 MMOL/L
AST SERPL-CCNC: 24 U/L
BILIRUB SERPL-MCNC: 0.4 MG/DL
BUN SERPL-MCNC: 22 MG/DL
CALCIUM SERPL-MCNC: 9.8 MG/DL
CHLORIDE SERPL-SCNC: 104 MMOL/L
CHOLEST SERPL-MCNC: 160 MG/DL
CO2 SERPL-SCNC: 26 MMOL/L
CREAT SERPL-MCNC: 0.91 MG/DL
EGFRCR SERPLBLD CKD-EPI 2021: 60 ML/MIN/1.73M2
GLUCOSE SERPL-MCNC: 104 MG/DL
HCT VFR BLD CALC: 42.7 %
HDLC SERPL-MCNC: 66 MG/DL
HGB BLD-MCNC: 13.6 G/DL
LDLC SERPL-MCNC: 82 MG/DL
MCHC RBC-ENTMCNC: 29.6 PG
MCHC RBC-ENTMCNC: 31.9 G/DL
MCV RBC AUTO: 92.8 FL
NONHDLC SERPL-MCNC: 93 MG/DL
NT-PROBNP SERPL-MCNC: 1067 PG/ML
PLATELET # BLD AUTO: 188 K/UL
POTASSIUM SERPL-SCNC: 5 MMOL/L
PROT SERPL-MCNC: 7.5 G/DL
RBC # BLD: 4.6 M/UL
RBC # FLD: 13.5 %
SODIUM SERPL-SCNC: 142 MMOL/L
TRIGL SERPL-MCNC: 56 MG/DL
TSH SERPL-ACNC: 0.84 UIU/ML
WBC # FLD AUTO: 6.25 K/UL

## 2025-08-14 ENCOUNTER — APPOINTMENT (OUTPATIENT)
Dept: CARDIOLOGY | Facility: CLINIC | Age: 89
End: 2025-08-14
Payer: MEDICARE

## 2025-08-14 VITALS
WEIGHT: 108 LBS | BODY MASS INDEX: 21.09 KG/M2 | SYSTOLIC BLOOD PRESSURE: 137 MMHG | HEART RATE: 57 BPM | DIASTOLIC BLOOD PRESSURE: 80 MMHG | OXYGEN SATURATION: 100 %

## 2025-08-14 DIAGNOSIS — Z79.01 LONG TERM (CURRENT) USE OF ANTICOAGULANTS: ICD-10-CM

## 2025-08-14 DIAGNOSIS — I48.91 UNSPECIFIED ATRIAL FIBRILLATION: ICD-10-CM

## 2025-08-14 DIAGNOSIS — I49.3 VENTRICULAR PREMATURE DEPOLARIZATION: ICD-10-CM

## 2025-08-14 DIAGNOSIS — Z95.3 PRESENCE OF XENOGENIC HEART VALVE: ICD-10-CM

## 2025-08-14 PROCEDURE — G2211 COMPLEX E/M VISIT ADD ON: CPT

## 2025-08-14 PROCEDURE — 93000 ELECTROCARDIOGRAM COMPLETE: CPT

## 2025-08-14 PROCEDURE — 99214 OFFICE O/P EST MOD 30 MIN: CPT

## 2025-08-17 ENCOUNTER — NON-APPOINTMENT (OUTPATIENT)
Age: 89
End: 2025-08-17

## 2025-08-18 ENCOUNTER — NON-APPOINTMENT (OUTPATIENT)
Age: 89
End: 2025-08-18